# Patient Record
Sex: FEMALE | Race: WHITE | NOT HISPANIC OR LATINO
[De-identification: names, ages, dates, MRNs, and addresses within clinical notes are randomized per-mention and may not be internally consistent; named-entity substitution may affect disease eponyms.]

---

## 2017-02-06 ENCOUNTER — CHARTING TRANS (OUTPATIENT)
Dept: OTHER | Age: 72
End: 2017-02-06

## 2017-02-06 ENCOUNTER — CHARTING TRANS (OUTPATIENT)
Dept: SURGERY | Age: 72
End: 2017-02-06

## 2017-02-13 ENCOUNTER — CHARTING TRANS (OUTPATIENT)
Dept: OTHER | Age: 72
End: 2017-02-13

## 2017-02-22 ENCOUNTER — CHARTING TRANS (OUTPATIENT)
Dept: OTHER | Age: 72
End: 2017-02-22

## 2017-02-25 ENCOUNTER — LAB SERVICES (OUTPATIENT)
Dept: OTHER | Age: 72
End: 2017-02-25

## 2017-02-25 LAB
ALBUMIN SERPL BCG-MCNC: 3.8 G/DL (ref 3.6–5.1)
ALP SERPL-CCNC: 123 U/L (ref 45–105)
ALT SERPL W/O P-5'-P-CCNC: 25 U/L (ref 15–43)
AST SERPL-CCNC: 20 U/L (ref 14–43)
BASOPHIL %: 0.4 % (ref 0–1.2)
BASOPHIL ABSOLUTE #: 0.1 10*3/UL (ref 0–0.1)
BILIRUB SERPL-MCNC: 0.6 MG/DL (ref 0–1.3)
BILIRUBIN URINE: NEGATIVE
BLOOD URINE: ABNORMAL
BUN SERPL-MCNC: 8 MG/DL (ref 7–20)
CALCIUM SERPL-MCNC: 9.9 MG/DL (ref 8.6–10.6)
CHLORIDE SERPL-SCNC: 98 MMOL/L (ref 96–107)
CHOLEST SERPL-MCNC: 176 MG/DL (ref 140–200)
CLARITY: CLEAR
COLOR: ABNORMAL
CREATININE, SERUM: 0.7 MG/DL (ref 0.5–1.4)
DIFFERENTIAL TYPE: ABNORMAL
EOSINOPHIL %: 2.6 % (ref 0–10)
EOSINOPHIL ABSOLUTE #: 0.3 10*3/UL (ref 0–0.5)
GFR SERPL CREATININE-BSD FRML MDRD: >60 ML/MIN/{1.73M2}
GFR SERPL CREATININE-BSD FRML MDRD: >60 ML/MIN/{1.73M2}
GLUCOSE P FAST SERPL-MCNC: 89 MG/DL (ref 60–100)
GLUCOSE QUALITATIVE U: NEGATIVE
HCO3 SERPL-SCNC: 33 MMOL/L (ref 22–32)
HDLC SERPL-MCNC: 71 MG/DL
HEMATOCRIT: 44.8 % (ref 34–45)
HEMOGLOBIN: 14.1 G/DL (ref 11.2–15.7)
KETONES, URINE: NEGATIVE
LDLC SERPL CALC-MCNC: 85 MG/DL (ref 30–100)
LEUKOCYTE ESTERASE URINE: NEGATIVE
LYMPH PERCENT: 26.4 % (ref 20.5–51.1)
LYMPHOCYTE ABSOLUTE #: 3.4 10*3/UL (ref 1.2–3.4)
MEAN CORPUSCULAR HGB CONCENTRATION: 31.5 % (ref 32–36)
MEAN CORPUSCULAR HGB: 26.5 PG (ref 27–34)
MEAN CORPUSCULAR VOLUME: 84.2 FL (ref 79–95)
MEAN PLATELET VOLUME: 12.2 FL (ref 8.6–12.4)
MONOCYTE ABSOLUTE #: 0.8 10*3/UL (ref 0.2–0.9)
MONOCYTE PERCENT: 6.1 % (ref 4.3–12.9)
NEUTROPHIL ABSOLUTE #: 8.4 10*3/UL (ref 1.4–6.5)
NEUTROPHIL PERCENT: 64.5 % (ref 34–73.5)
NITRITE URINE: NEGATIVE
NON SQUAMOUS EPITHELIAL CELLS: NORMAL
PH URINE: 7 (ref 5–7)
PLATELET COUNT: 306 10*3/UL (ref 150–400)
POTASSIUM SERPL-SCNC: 5.6 MMOL/L (ref 3.5–5.3)
PROT SERPL-MCNC: 6.7 G/DL (ref 6.4–8.5)
RED BLOOD CELL COUNT: 5.32 10*6/UL (ref 3.7–5.2)
RED BLOOD CELLS URINE: 0 (ref 0–3)
RED CELL DISTRIBUTION WIDTH: 14.6 % (ref 11.3–14.8)
SODIUM SERPL-SCNC: 138 MMOL/L (ref 136–146)
SPECIFIC GRAVITY URINE: 1 (ref 1–1.03)
SQUAMOUS EPITHELIAL CELLS: 0
TRIGL SERPL-MCNC: 102 MG/DL (ref 0–200)
URINE PROTEIN, QUAL (DIPSTICK): NEGATIVE
UROBILINOGEN URINE: <2
WHITE BLOOD CELL COUNT: 13 10*3/UL (ref 4–10)
WHITE BLOOD CELLS URINE: NORMAL (ref 0–5)

## 2017-02-28 ENCOUNTER — LAB SERVICES (OUTPATIENT)
Dept: OTHER | Age: 72
End: 2017-02-28

## 2017-03-02 ENCOUNTER — CHARTING TRANS (OUTPATIENT)
Dept: OTHER | Age: 72
End: 2017-03-02

## 2017-03-02 ENCOUNTER — CHARTING TRANS (OUTPATIENT)
Dept: INTERNAL MEDICINE | Age: 72
End: 2017-03-02

## 2017-03-06 LAB — HEMOCCULT STL QL: NEGATIVE

## 2017-06-26 ENCOUNTER — LAB SERVICES (OUTPATIENT)
Dept: OTHER | Age: 72
End: 2017-06-26

## 2017-06-26 ENCOUNTER — CHARTING TRANS (OUTPATIENT)
Dept: OTHER | Age: 72
End: 2017-06-26

## 2017-06-28 LAB — AP REPORT: NORMAL

## 2017-06-29 ENCOUNTER — CHARTING TRANS (OUTPATIENT)
Dept: OTHER | Age: 72
End: 2017-06-29

## 2018-02-12 ENCOUNTER — LAB SERVICES (OUTPATIENT)
Dept: OTHER | Age: 73
End: 2018-02-12

## 2018-02-12 ENCOUNTER — CHARTING TRANS (OUTPATIENT)
Dept: OTHER | Age: 73
End: 2018-02-12

## 2018-02-12 LAB — 25(OH)D3 SERPL-MCNC: 32.8 NG/ML (ref 30–100)

## 2018-04-20 ENCOUNTER — CHARTING TRANS (OUTPATIENT)
Dept: OTHER | Age: 73
End: 2018-04-20

## 2018-04-28 ENCOUNTER — LAB SERVICES (OUTPATIENT)
Dept: OTHER | Age: 73
End: 2018-04-28

## 2018-04-28 LAB
ALBUMIN SERPL-MCNC: 4.4 G/DL (ref 3.6–5.1)
ALP SERPL-CCNC: 109 U/L (ref 45–105)
ALT SERPL-CCNC: 25 U/L (ref 15–43)
AST SERPL-CCNC: 20 U/L (ref 14–43)
BASOPHIL %: 0.4 % (ref 0–1.2)
BASOPHIL ABSOLUTE #: 0.1 10*3/UL (ref 0–0.1)
BILIRUB SERPL-MCNC: 0.6 MG/DL (ref 0–1.3)
BILIRUBIN URINE: NEGATIVE
BLOOD URINE: NEGATIVE
BUN SERPL-MCNC: 13 MG/DL (ref 7–20)
CALCIUM SERPL-MCNC: 10 MG/DL (ref 8.6–10.6)
CHLORIDE SERPL-SCNC: 103 MMOL/L (ref 96–107)
CHOLEST SERPL-MCNC: 171 MG/DL (ref 140–200)
CLARITY: CLEAR
CO2 SERPL-SCNC: 28 MMOL/L (ref 22–32)
COLOR: YELLOW
CREAT SERPL-MCNC: 0.8 MG/DL (ref 0.5–1.4)
DIFFERENTIAL TYPE: ABNORMAL
EOSINOPHIL %: 2.9 % (ref 0–10)
EOSINOPHIL ABSOLUTE #: 0.3 10*3/UL (ref 0–0.5)
GFR SERPL CREATININE-BSD FRML MDRD: >60 ML/MIN/{1.73M2}
GFR SERPL CREATININE-BSD FRML MDRD: >60 ML/MIN/{1.73M2}
GLUCOSE P FAST SERPL-MCNC: 96 MG/DL (ref 60–100)
GLUCOSE QUALITATIVE U: NEGATIVE
HDLC SERPL-MCNC: 66 MG/DL
HEMATOCRIT: 45.3 % (ref 34–45)
HEMOGLOBIN: 14.1 G/DL (ref 11.2–15.7)
KETONES, URINE: NEGATIVE
LDLC SERPL CALC-MCNC: 87 MG/DL (ref 30–100)
LEUKOCYTE ESTERASE URINE: NEGATIVE
LYMPH PERCENT: 28.4 % (ref 20.5–51.1)
LYMPHOCYTE ABSOLUTE #: 3.3 10*3/UL (ref 1.2–3.4)
MEAN CORPUSCULAR HGB CONCENTRATION: 31.1 % (ref 32–36)
MEAN CORPUSCULAR HGB: 27.1 PG (ref 27–34)
MEAN CORPUSCULAR VOLUME: 86.9 FL (ref 79–95)
MEAN PLATELET VOLUME: 12.3 FL (ref 8.6–12.4)
MONOCYTE ABSOLUTE #: 0.7 10*3/UL (ref 0.2–0.9)
MONOCYTE PERCENT: 6.4 % (ref 4.3–12.9)
NEUTROPHIL ABSOLUTE #: 7.1 10*3/UL (ref 1.4–6.5)
NEUTROPHIL PERCENT: 61.9 % (ref 34–73.5)
NITRITE URINE: NEGATIVE
PH URINE: 8 (ref 5–7)
PLATELET COUNT: 288 10*3/UL (ref 150–400)
POTASSIUM SERPL-SCNC: 5.4 MMOL/L (ref 3.5–5.3)
PROT SERPL-MCNC: 7.2 G/DL (ref 6.4–8.5)
RED BLOOD CELL COUNT: 5.21 10*6/UL (ref 3.7–5.2)
RED CELL DISTRIBUTION WIDTH: 15 % (ref 11.3–14.8)
SODIUM SERPL-SCNC: 143 MMOL/L (ref 136–146)
SPECIFIC GRAVITY URINE: 1.02 (ref 1–1.03)
TRIGL SERPL-MCNC: 89 MG/DL (ref 0–200)
URINE PROTEIN, QUAL (DIPSTICK): 30
UROBILINOGEN URINE: <2
WHITE BLOOD CELL COUNT: 11.5 10*3/UL (ref 4–10)

## 2018-05-07 ENCOUNTER — LAB SERVICES (OUTPATIENT)
Dept: OTHER | Age: 73
End: 2018-05-07

## 2018-05-08 LAB — HEMOCCULT STL QL IA: NEGATIVE

## 2018-05-09 ENCOUNTER — CHARTING TRANS (OUTPATIENT)
Dept: OTHER | Age: 73
End: 2018-05-09

## 2018-11-23 ENCOUNTER — IMAGING SERVICES (OUTPATIENT)
Dept: OTHER | Age: 73
End: 2018-11-23

## 2018-11-29 VITALS
WEIGHT: 149 LBS | SYSTOLIC BLOOD PRESSURE: 140 MMHG | HEART RATE: 88 BPM | DIASTOLIC BLOOD PRESSURE: 80 MMHG | OXYGEN SATURATION: 98 %

## 2018-11-29 VITALS — BODY MASS INDEX: 24.75 KG/M2 | HEIGHT: 64 IN | WEIGHT: 145 LBS

## 2019-01-01 ENCOUNTER — EXTERNAL RECORD (OUTPATIENT)
Dept: HEALTH INFORMATION MANAGEMENT | Facility: OTHER | Age: 74
End: 2019-01-01

## 2019-02-11 ENCOUNTER — IMAGING SERVICES (OUTPATIENT)
Dept: OTHER | Age: 74
End: 2019-02-11

## 2019-03-06 VITALS
OXYGEN SATURATION: 96 % | SYSTOLIC BLOOD PRESSURE: 122 MMHG | DIASTOLIC BLOOD PRESSURE: 70 MMHG | WEIGHT: 151 LBS | HEART RATE: 87 BPM | BODY MASS INDEX: 25.92 KG/M2

## 2019-03-06 VITALS — BODY MASS INDEX: 25.66 KG/M2 | HEIGHT: 65 IN | WEIGHT: 154 LBS

## 2019-05-14 ENCOUNTER — TELEPHONE (OUTPATIENT)
Dept: INTERNAL MEDICINE | Age: 74
End: 2019-05-14

## 2019-05-14 DIAGNOSIS — Z00.00 ROUTINE GENERAL MEDICAL EXAMINATION AT A HEALTH CARE FACILITY: Primary | ICD-10-CM

## 2019-05-14 DIAGNOSIS — E78.2 MIXED HYPERLIPIDEMIA: ICD-10-CM

## 2019-05-18 RX ORDER — SIMVASTATIN 10 MG
10 TABLET ORAL NIGHTLY
Qty: 90 TABLET | Refills: 0 | Status: SHIPPED | OUTPATIENT
Start: 2019-05-18 | End: 2019-07-15 | Stop reason: SDUPTHER

## 2019-05-18 RX ORDER — SIMVASTATIN 10 MG
10 TABLET ORAL NIGHTLY
COMMUNITY
Start: 2018-05-09 | End: 2019-05-18 | Stop reason: SDUPTHER

## 2019-06-08 ENCOUNTER — LAB SERVICES (OUTPATIENT)
Dept: LAB | Age: 74
End: 2019-06-08

## 2019-06-08 DIAGNOSIS — E78.2 MIXED HYPERLIPIDEMIA: ICD-10-CM

## 2019-06-08 DIAGNOSIS — Z00.00 ROUTINE GENERAL MEDICAL EXAMINATION AT A HEALTH CARE FACILITY: ICD-10-CM

## 2019-06-08 LAB
BILIRUBIN URINE: NEGATIVE
BLOOD URINE: ABNORMAL
CHOLEST SERPL-MCNC: 200 MG/DL (ref 140–200)
CLARITY: CLEAR
COLOR: YELLOW
GLUCOSE QUALITATIVE U: NEGATIVE
HDLC SERPL-MCNC: 84 MG/DL
KETONES, URINE: NEGATIVE
LDLC SERPL CALC-MCNC: 92 MG/DL (ref 30–100)
LEUKOCYTE ESTERASE URINE: ABNORMAL
NITRITE URINE: NEGATIVE
PH URINE: 6 (ref 5–7)
RED BLOOD CELLS URINE: NORMAL (ref 0–3)
SPECIFIC GRAVITY URINE: 1.01 (ref 1–1.03)
SQUAMOUS EPITHELIAL CELLS: NORMAL
TRIGL SERPL-MCNC: 120 MG/DL (ref 0–200)
URINE PROTEIN, QUAL (DIPSTICK): NEGATIVE
UROBILINOGEN URINE: <2
WHITE BLOOD CELLS URINE: NORMAL (ref 0–5)

## 2019-06-08 PROCEDURE — 36415 COLL VENOUS BLD VENIPUNCTURE: CPT | Performed by: INTERNAL MEDICINE

## 2019-06-08 PROCEDURE — 80061 LIPID PANEL: CPT | Performed by: INTERNAL MEDICINE

## 2019-06-08 PROCEDURE — 81001 URINALYSIS AUTO W/SCOPE: CPT | Performed by: INTERNAL MEDICINE

## 2019-07-03 ENCOUNTER — LAB SERVICES (OUTPATIENT)
Dept: LAB | Age: 74
End: 2019-07-03

## 2019-07-03 ENCOUNTER — APPOINTMENT (OUTPATIENT)
Dept: INTERNAL MEDICINE | Age: 74
End: 2019-07-03

## 2019-07-03 DIAGNOSIS — Z00.00 ROUTINE GENERAL MEDICAL EXAMINATION AT HEALTH CARE FACILITY: Primary | ICD-10-CM

## 2019-07-03 PROCEDURE — G0328 FECAL BLOOD SCRN IMMUNOASSAY: HCPCS | Performed by: INTERNAL MEDICINE

## 2019-07-04 LAB — HEMOCCULT STL QL IA: NEGATIVE

## 2019-07-05 ENCOUNTER — TELEPHONE (OUTPATIENT)
Dept: INTERNAL MEDICINE | Age: 74
End: 2019-07-05

## 2019-07-15 ENCOUNTER — OFFICE VISIT (OUTPATIENT)
Dept: INTERNAL MEDICINE | Age: 74
End: 2019-07-15

## 2019-07-15 VITALS
BODY MASS INDEX: 24 KG/M2 | DIASTOLIC BLOOD PRESSURE: 64 MMHG | OXYGEN SATURATION: 96 % | WEIGHT: 142 LBS | SYSTOLIC BLOOD PRESSURE: 120 MMHG | HEART RATE: 95 BPM

## 2019-07-15 DIAGNOSIS — M81.0 OSTEOPOROSIS WITHOUT CURRENT PATHOLOGICAL FRACTURE, UNSPECIFIED OSTEOPOROSIS TYPE: ICD-10-CM

## 2019-07-15 DIAGNOSIS — Z86.010 HX OF ADENOMATOUS COLONIC POLYPS: ICD-10-CM

## 2019-07-15 DIAGNOSIS — F17.200 TOBACCO USE DISORDER: ICD-10-CM

## 2019-07-15 DIAGNOSIS — Z17.0 LOBULAR CARCINOMA OF BREAST, STAGE 1, ESTROGEN RECEPTOR POSITIVE, RIGHT (CMD): ICD-10-CM

## 2019-07-15 DIAGNOSIS — C50.911 LOBULAR CARCINOMA OF BREAST, STAGE 1, ESTROGEN RECEPTOR POSITIVE, RIGHT (CMD): ICD-10-CM

## 2019-07-15 DIAGNOSIS — Z17.0 MALIGNANT NEOPLASM OF LEFT BREAST IN FEMALE, ESTROGEN RECEPTOR POSITIVE, UNSPECIFIED SITE OF BREAST (CMD): Primary | ICD-10-CM

## 2019-07-15 DIAGNOSIS — C50.912 MALIGNANT NEOPLASM OF LEFT BREAST IN FEMALE, ESTROGEN RECEPTOR POSITIVE, UNSPECIFIED SITE OF BREAST (CMD): Primary | ICD-10-CM

## 2019-07-15 DIAGNOSIS — N95.1 SYMPTOMATIC MENOPAUSAL OR FEMALE CLIMACTERIC STATES: ICD-10-CM

## 2019-07-15 PROCEDURE — 99214 OFFICE O/P EST MOD 30 MIN: CPT | Performed by: INTERNAL MEDICINE

## 2019-07-15 RX ORDER — SIMVASTATIN 10 MG
10 TABLET ORAL NIGHTLY
Qty: 90 TABLET | Refills: 3 | Status: SHIPPED | OUTPATIENT
Start: 2019-07-15

## 2019-07-15 RX ORDER — LETROZOLE 2.5 MG/1
2.5 TABLET, FILM COATED ORAL DAILY
COMMUNITY
Start: 2015-10-26

## 2019-08-04 PROBLEM — C50.912 MALIGNANT NEOPLASM OF UNSPECIFIED SITE OF LEFT FEMALE BREAST (CMD): Status: ACTIVE | Noted: 2019-08-04

## 2019-08-04 PROBLEM — Z86.010 HX OF ADENOMATOUS COLONIC POLYPS: Status: ACTIVE | Noted: 2019-08-04

## 2019-08-04 ASSESSMENT — ENCOUNTER SYMPTOMS
NEUROLOGICAL NEGATIVE: 1
GASTROINTESTINAL NEGATIVE: 1
RESPIRATORY NEGATIVE: 1
ENDOCRINE NEGATIVE: 1
ALLERGIC/IMMUNOLOGIC NEGATIVE: 1
EYES NEGATIVE: 1
HEMATOLOGIC/LYMPHATIC NEGATIVE: 1
PSYCHIATRIC NEGATIVE: 1
CONSTITUTIONAL NEGATIVE: 1

## 2020-03-02 ENCOUNTER — TELEPHONE (OUTPATIENT)
Dept: INTERNAL MEDICINE | Age: 75
End: 2020-03-02

## 2020-03-03 ENCOUNTER — OFFICE VISIT (OUTPATIENT)
Dept: INTERNAL MEDICINE | Age: 75
End: 2020-03-03

## 2020-03-03 VITALS
HEART RATE: 92 BPM | BODY MASS INDEX: 24.5 KG/M2 | SYSTOLIC BLOOD PRESSURE: 122 MMHG | RESPIRATION RATE: 16 BRPM | WEIGHT: 145 LBS | DIASTOLIC BLOOD PRESSURE: 68 MMHG | OXYGEN SATURATION: 96 %

## 2020-03-03 DIAGNOSIS — E78.2 MIXED HYPERLIPIDEMIA: ICD-10-CM

## 2020-03-03 DIAGNOSIS — M25.511 BILATERAL SHOULDER PAIN, UNSPECIFIED CHRONICITY: ICD-10-CM

## 2020-03-03 DIAGNOSIS — M79.10 MUSCLE PAIN: Primary | ICD-10-CM

## 2020-03-03 DIAGNOSIS — Z17.0 LOBULAR CARCINOMA OF BREAST, STAGE 1, ESTROGEN RECEPTOR POSITIVE, RIGHT (CMD): ICD-10-CM

## 2020-03-03 DIAGNOSIS — M25.512 BILATERAL SHOULDER PAIN, UNSPECIFIED CHRONICITY: ICD-10-CM

## 2020-03-03 DIAGNOSIS — C50.911 LOBULAR CARCINOMA OF BREAST, STAGE 1, ESTROGEN RECEPTOR POSITIVE, RIGHT (CMD): ICD-10-CM

## 2020-03-03 PROCEDURE — 99213 OFFICE O/P EST LOW 20 MIN: CPT | Performed by: INTERNAL MEDICINE

## 2020-03-17 ENCOUNTER — TELEPHONE (OUTPATIENT)
Dept: INTERNAL MEDICINE | Age: 75
End: 2020-03-17

## 2020-03-17 DIAGNOSIS — M79.10 MUSCLE PAIN: Primary | ICD-10-CM

## 2020-03-18 ENCOUNTER — LAB SERVICES (OUTPATIENT)
Dept: LAB | Age: 75
End: 2020-03-18

## 2020-03-18 DIAGNOSIS — M79.10 MUSCLE PAIN: ICD-10-CM

## 2020-03-18 LAB
ALBUMIN SERPL-MCNC: 4.6 G/DL (ref 3.6–5.1)
ALP SERPL-CCNC: 96 U/L (ref 45–130)
ALT SERPL W/O P-5'-P-CCNC: 14 U/L (ref 4–38)
AST SERPL-CCNC: 20 U/L (ref 14–43)
ATYPICAL LYMPH: 1 (ref 0–4)
BILIRUB SERPL-MCNC: 0.6 MG/DL (ref 0–1.3)
BUN SERPL-MCNC: 12 MG/DL (ref 7–20)
CALCIUM SERPL-MCNC: 9.9 MG/DL (ref 8.6–10.6)
CHLORIDE SERPL-SCNC: 99 MMOL/L (ref 96–107)
CK SERPL-CCNC: 24 U/L (ref 30–135)
CO2 SERPL-SCNC: 30 MMOL/L (ref 22–32)
CREAT SERPL-MCNC: 0.7 MG/DL (ref 0.5–1.4)
DIFFERENTIAL TYPE: ABNORMAL
EOSINOPHIL % MD: 1 % (ref 0–10)
EOSINOPHIL ABSOLUTE # MD: 0.1 /UL (ref 0–0.5)
GFR SERPL CREATININE-BSD FRML MDRD: >60 ML/MIN/{1.73M2}
GFR SERPL CREATININE-BSD FRML MDRD: >60 ML/MIN/{1.73M2}
GLUCOSE SERPL-MCNC: 112 MG/DL (ref 70–200)
HEMATOCRIT: 42.3 % (ref 34–45)
HEMOGLOBIN: 13.4 G/DL (ref 11.2–15.7)
LYMPH PERCENT MD: 20 % (ref 20.5–51.1)
LYMPHOCYTE ABSOLUTE # MD: 2.8 /UL (ref 1.2–3.4)
MEAN CORPUSCULAR HGB CONCENTRATION: 31.7 % (ref 32–36)
MEAN CORPUSCULAR HGB: 26.2 PG (ref 27–34)
MEAN CORPUSCULAR VOLUME: 82.8 FL (ref 79–95)
MEAN PLATELET VOLUME: 11 FL (ref 8.6–12.4)
MONOCYTE ABSOLUTE # MD: 1.3 /UL (ref 0.2–0.9)
MONOCYTE PERCENT MD: 10 % (ref 4.3–12.9)
NEUTROPHIL ABSOLUTE # MD: 9 /UL (ref 1.4–6.5)
NEUTROPHIL PERCENT MD: 68 % (ref 34–73.5)
PLATELET COUNT: 442 10*3/UL (ref 150–400)
POTASSIUM SERPL-SCNC: 5 MMOL/L (ref 3.5–5.3)
PROT SERPL-MCNC: 7.7 G/DL (ref 6.4–8.5)
RBC & PLT MORPHOLOGY: ABNORMAL
RED BLOOD CELL COUNT: 5.11 10*6/UL (ref 3.7–5.2)
RED CELL DISTRIBUTION WIDTH: 14.3 % (ref 11.3–14.8)
SEDIMENTATION RATE, RBC: 21 MM/H (ref 0–20)
SODIUM SERPL-SCNC: 138 MMOL/L (ref 136–146)
TSH SERPL DL<=0.05 MIU/L-ACNC: 1.27 M[IU]/L (ref 0.3–4.82)
WHITE BLOOD CELL COUNT: 13.2 10*3/UL (ref 4–10)

## 2020-03-18 PROCEDURE — 80050 GENERAL HEALTH PANEL: CPT | Performed by: INTERNAL MEDICINE

## 2020-03-18 PROCEDURE — 36415 COLL VENOUS BLD VENIPUNCTURE: CPT | Performed by: INTERNAL MEDICINE

## 2020-03-18 PROCEDURE — 85652 RBC SED RATE AUTOMATED: CPT | Performed by: INTERNAL MEDICINE

## 2020-03-18 PROCEDURE — 82550 ASSAY OF CK (CPK): CPT | Performed by: INTERNAL MEDICINE

## 2020-03-23 ENCOUNTER — TELEPHONE (OUTPATIENT)
Dept: INTERNAL MEDICINE | Age: 75
End: 2020-03-23

## 2020-03-27 ENCOUNTER — TELEPHONE (OUTPATIENT)
Dept: INTERNAL MEDICINE | Age: 75
End: 2020-03-27

## 2020-03-28 RX ORDER — MELOXICAM 15 MG/1
15 TABLET ORAL DAILY
Qty: 14 TABLET | Refills: 0 | Status: SHIPPED | OUTPATIENT
Start: 2020-03-28

## 2020-04-13 ENCOUNTER — TELEPHONE (OUTPATIENT)
Dept: INTERNAL MEDICINE | Age: 75
End: 2020-04-13

## 2020-04-13 DIAGNOSIS — M79.10 MUSCLE PAIN: Primary | ICD-10-CM

## 2020-04-13 RX ORDER — MELOXICAM 15 MG/1
15 TABLET ORAL DAILY
Qty: 14 TABLET | Refills: 0 | Status: CANCELLED | OUTPATIENT
Start: 2020-04-13

## 2020-04-20 ENCOUNTER — TELEPHONE (OUTPATIENT)
Dept: INTERNAL MEDICINE | Age: 75
End: 2020-04-20

## 2020-04-21 ENCOUNTER — LAB SERVICES (OUTPATIENT)
Dept: LAB | Age: 75
End: 2020-04-21

## 2020-04-21 DIAGNOSIS — M79.10 MUSCLE PAIN: ICD-10-CM

## 2020-04-21 LAB
ANISOCYTOSIS: ABNORMAL
BANDS: 1 % (ref 0–5)
BASOPHIL % MD: 2 % (ref 0–1.2)
BASOPHIL ABSOLUTE # MD: 0.3 /UL (ref 0–0.1)
DIFFERENTIAL TYPE: ABNORMAL
EOSINOPHIL % MD: 1 % (ref 0–10)
EOSINOPHIL ABSOLUTE # MD: 0.2 /UL (ref 0–0.5)
HEMATOCRIT: 42.2 % (ref 34–45)
HEMOGLOBIN: 13 G/DL (ref 11.2–15.7)
LYMPH PERCENT MD: 27 % (ref 20.5–51.1)
LYMPHOCYTE ABSOLUTE # MD: 4.1 /UL (ref 1.2–3.4)
MEAN CORPUSCULAR HGB CONCENTRATION: 30.8 % (ref 32–36)
MEAN CORPUSCULAR HGB: 25.3 PG (ref 27–34)
MEAN CORPUSCULAR VOLUME: 82.3 FL (ref 79–95)
MEAN PLATELET VOLUME: 11.3 FL (ref 8.6–12.4)
MONOCYTE ABSOLUTE # MD: 0.6 /UL (ref 0.2–0.9)
MONOCYTE PERCENT MD: 4 % (ref 4.3–12.9)
NEUTROPHIL ABSOLUTE # MD: 10 /UL (ref 1.4–6.5)
NEUTROPHIL PERCENT MD: 65 % (ref 34–73.5)
PLATELET COUNT: 440 10*3/UL (ref 150–400)
RED BLOOD CELL COUNT: 5.13 10*6/UL (ref 3.7–5.2)
RED CELL DISTRIBUTION WIDTH: 15.1 % (ref 11.3–14.8)
SEDIMENTATION RATE, RBC: 41 MM/H (ref 0–20)
WHITE BLOOD CELL COUNT: 15.2 10*3/UL (ref 4–10)

## 2020-04-21 PROCEDURE — 85652 RBC SED RATE AUTOMATED: CPT | Performed by: INTERNAL MEDICINE

## 2020-04-21 PROCEDURE — 85027 COMPLETE CBC AUTOMATED: CPT | Performed by: INTERNAL MEDICINE

## 2020-04-21 PROCEDURE — 85007 BL SMEAR W/DIFF WBC COUNT: CPT | Performed by: INTERNAL MEDICINE

## 2020-04-21 PROCEDURE — 36415 COLL VENOUS BLD VENIPUNCTURE: CPT | Performed by: INTERNAL MEDICINE

## 2020-04-22 RX ORDER — PREDNISONE 10 MG/1
10 TABLET ORAL DAILY
Qty: 30 TABLET | Refills: 1 | Status: SHIPPED | OUTPATIENT
Start: 2020-04-22 | End: 2020-06-03 | Stop reason: SDUPTHER

## 2020-04-30 ENCOUNTER — TELEPHONE (OUTPATIENT)
Dept: INTERNAL MEDICINE | Age: 75
End: 2020-04-30

## 2020-04-30 DIAGNOSIS — M79.10 MUSCLE PAIN: Primary | ICD-10-CM

## 2020-05-08 ENCOUNTER — LAB SERVICES (OUTPATIENT)
Dept: LAB | Age: 75
End: 2020-05-08

## 2020-05-08 DIAGNOSIS — M79.10 MUSCLE PAIN: ICD-10-CM

## 2020-05-08 LAB — SEDIMENTATION RATE, RBC: 26 MM/H (ref 0–20)

## 2020-05-08 PROCEDURE — 85652 RBC SED RATE AUTOMATED: CPT | Performed by: INTERNAL MEDICINE

## 2020-05-08 PROCEDURE — 36415 COLL VENOUS BLD VENIPUNCTURE: CPT | Performed by: INTERNAL MEDICINE

## 2020-05-11 DIAGNOSIS — M79.10 MUSCLE PAIN: Primary | ICD-10-CM

## 2020-06-01 ENCOUNTER — LAB SERVICES (OUTPATIENT)
Dept: LAB | Age: 75
End: 2020-06-01

## 2020-06-01 DIAGNOSIS — M79.10 MUSCLE PAIN: ICD-10-CM

## 2020-06-01 LAB — SEDIMENTATION RATE, RBC: 22 MM/H (ref 0–20)

## 2020-06-01 PROCEDURE — 85652 RBC SED RATE AUTOMATED: CPT | Performed by: INTERNAL MEDICINE

## 2020-06-01 PROCEDURE — 36415 COLL VENOUS BLD VENIPUNCTURE: CPT | Performed by: INTERNAL MEDICINE

## 2020-06-03 DIAGNOSIS — M79.10 MUSCLE PAIN: Primary | ICD-10-CM

## 2020-06-03 RX ORDER — PREDNISONE 10 MG/1
10 TABLET ORAL DAILY
Qty: 30 TABLET | Refills: 0 | Status: SHIPPED | OUTPATIENT
Start: 2020-06-03 | End: 2020-06-29 | Stop reason: SDUPTHER

## 2020-06-23 ENCOUNTER — LAB SERVICES (OUTPATIENT)
Dept: LAB | Age: 75
End: 2020-06-23

## 2020-06-23 DIAGNOSIS — M79.10 MUSCLE PAIN: ICD-10-CM

## 2020-06-23 LAB — SEDIMENTATION RATE, RBC: 42 MM/H (ref 0–20)

## 2020-06-23 PROCEDURE — 85652 RBC SED RATE AUTOMATED: CPT | Performed by: INTERNAL MEDICINE

## 2020-06-23 PROCEDURE — 36415 COLL VENOUS BLD VENIPUNCTURE: CPT | Performed by: INTERNAL MEDICINE

## 2020-06-29 ENCOUNTER — TELEPHONE (OUTPATIENT)
Dept: INTERNAL MEDICINE | Age: 75
End: 2020-06-29

## 2020-06-29 DIAGNOSIS — M79.10 MUSCLE PAIN: Primary | ICD-10-CM

## 2020-06-29 RX ORDER — PREDNISONE 10 MG/1
10 TABLET ORAL DAILY
Qty: 30 TABLET | Refills: 3 | Status: SHIPPED | OUTPATIENT
Start: 2020-06-29

## 2020-07-14 ENCOUNTER — TELEPHONE (OUTPATIENT)
Dept: INTERNAL MEDICINE | Age: 75
End: 2020-07-14

## 2020-08-18 ENCOUNTER — OFFICE VISIT (OUTPATIENT)
Dept: FAMILY MEDICINE CLINIC | Facility: CLINIC | Age: 75
End: 2020-08-18
Payer: COMMERCIAL

## 2020-08-18 ENCOUNTER — LAB ENCOUNTER (OUTPATIENT)
Dept: LAB | Age: 75
End: 2020-08-18
Attending: FAMILY MEDICINE
Payer: MEDICARE

## 2020-08-18 VITALS
TEMPERATURE: 98 F | DIASTOLIC BLOOD PRESSURE: 88 MMHG | SYSTOLIC BLOOD PRESSURE: 138 MMHG | HEIGHT: 63 IN | WEIGHT: 152.5 LBS | BODY MASS INDEX: 27.02 KG/M2 | HEART RATE: 115 BPM | OXYGEN SATURATION: 96 % | RESPIRATION RATE: 12 BRPM

## 2020-08-18 DIAGNOSIS — E78.2 MIXED HYPERLIPIDEMIA: Primary | ICD-10-CM

## 2020-08-18 DIAGNOSIS — E78.2 MIXED HYPERLIPIDEMIA: ICD-10-CM

## 2020-08-18 DIAGNOSIS — Z13.6 SCREENING FOR CARDIOVASCULAR CONDITION: ICD-10-CM

## 2020-08-18 DIAGNOSIS — Z11.59 NEED FOR HEPATITIS C SCREENING TEST: ICD-10-CM

## 2020-08-18 DIAGNOSIS — Z98.890 S/P BREAST RECONSTRUCTION, BILATERAL: ICD-10-CM

## 2020-08-18 DIAGNOSIS — C50.912 LOBULAR CARCINOMA OF LEFT BREAST (HCC): ICD-10-CM

## 2020-08-18 DIAGNOSIS — Z00.00 LABORATORY EXAMINATION ORDERED AS PART OF A ROUTINE GENERAL MEDICAL EXAMINATION: ICD-10-CM

## 2020-08-18 DIAGNOSIS — M35.3 POLYMYALGIA (HCC): ICD-10-CM

## 2020-08-18 DIAGNOSIS — Z00.00 ENCOUNTER FOR ANNUAL HEALTH EXAMINATION: ICD-10-CM

## 2020-08-18 DIAGNOSIS — Z90.13 S/P BILATERAL MASTECTOMY: ICD-10-CM

## 2020-08-18 DIAGNOSIS — Z13.0 SCREENING, ANEMIA, DEFICIENCY, IRON: ICD-10-CM

## 2020-08-18 DIAGNOSIS — M81.0 AGE-RELATED OSTEOPOROSIS WITHOUT CURRENT PATHOLOGICAL FRACTURE: ICD-10-CM

## 2020-08-18 DIAGNOSIS — Q83.8 AMASTIA: ICD-10-CM

## 2020-08-18 DIAGNOSIS — C50.012 MALIGNANT NEOPLASM INVOLVING BOTH NIPPLE AND AREOLA OF LEFT BREAST IN FEMALE, UNSPECIFIED ESTROGEN RECEPTOR STATUS (HCC): ICD-10-CM

## 2020-08-18 DIAGNOSIS — N95.1 SYMPTOMATIC MENOPAUSAL OR FEMALE CLIMACTERIC STATES: ICD-10-CM

## 2020-08-18 DIAGNOSIS — F17.200 TOBACCO USE DISORDER: ICD-10-CM

## 2020-08-18 DIAGNOSIS — Z86.010 HX OF ADENOMATOUS COLONIC POLYPS: ICD-10-CM

## 2020-08-18 PROBLEM — Z86.0101 HX OF ADENOMATOUS COLONIC POLYPS: Status: ACTIVE | Noted: 2019-08-04

## 2020-08-18 LAB
ALBUMIN SERPL-MCNC: 3.9 G/DL (ref 3.4–5)
ALBUMIN/GLOB SERPL: 1 {RATIO} (ref 1–2)
ALP LIVER SERPL-CCNC: 112 U/L (ref 55–142)
ALT SERPL-CCNC: 28 U/L (ref 13–56)
ANION GAP SERPL CALC-SCNC: 4 MMOL/L (ref 0–18)
AST SERPL-CCNC: 17 U/L (ref 15–37)
BASOPHILS # BLD AUTO: 0.09 X10(3) UL (ref 0–0.2)
BASOPHILS NFR BLD AUTO: 0.5 %
BILIRUB SERPL-MCNC: 0.4 MG/DL (ref 0.1–2)
BUN BLD-MCNC: 13 MG/DL (ref 7–18)
BUN/CREAT SERPL: 14.6 (ref 10–20)
CALCIUM BLD-MCNC: 9.9 MG/DL (ref 8.5–10.1)
CHLORIDE SERPL-SCNC: 103 MMOL/L (ref 98–112)
CHOLEST SMN-MCNC: 201 MG/DL (ref ?–200)
CO2 SERPL-SCNC: 30 MMOL/L (ref 21–32)
CREAT BLD-MCNC: 0.89 MG/DL (ref 0.55–1.02)
CRP SERPL HS-MCNC: 26.7 MG/L (ref ?–3)
DEPRECATED RDW RBC AUTO: 49 FL (ref 35.1–46.3)
EOSINOPHIL # BLD AUTO: 0.05 X10(3) UL (ref 0–0.7)
EOSINOPHIL NFR BLD AUTO: 0.3 %
ERYTHROCYTE [DISTWIDTH] IN BLOOD BY AUTOMATED COUNT: 15.5 % (ref 11–15)
GLOBULIN PLAS-MCNC: 3.8 G/DL (ref 2.8–4.4)
GLUCOSE BLD-MCNC: 109 MG/DL (ref 70–99)
HCT VFR BLD AUTO: 48 % (ref 35–48)
HCV AB SERPL QL IA: NONREACTIVE
HDLC SERPL-MCNC: 81 MG/DL (ref 40–59)
HGB BLD-MCNC: 14.4 G/DL (ref 12–16)
IMM GRANULOCYTES # BLD AUTO: 0.1 X10(3) UL (ref 0–1)
IMM GRANULOCYTES NFR BLD: 0.6 %
LDLC SERPL CALC-MCNC: 90 MG/DL (ref ?–100)
LYMPHOCYTES # BLD AUTO: 1.79 X10(3) UL (ref 1–4)
LYMPHOCYTES NFR BLD AUTO: 10.2 %
M PROTEIN MFR SERPL ELPH: 7.7 G/DL (ref 6.4–8.2)
MCH RBC QN AUTO: 26 PG (ref 26–34)
MCHC RBC AUTO-ENTMCNC: 30 G/DL (ref 31–37)
MCV RBC AUTO: 86.6 FL (ref 80–100)
MONOCYTES # BLD AUTO: 0.65 X10(3) UL (ref 0.1–1)
MONOCYTES NFR BLD AUTO: 3.7 %
NEUTROPHILS # BLD AUTO: 14.79 X10 (3) UL (ref 1.5–7.7)
NEUTROPHILS # BLD AUTO: 14.79 X10(3) UL (ref 1.5–7.7)
NEUTROPHILS NFR BLD AUTO: 84.7 %
NONHDLC SERPL-MCNC: 120 MG/DL (ref ?–130)
OSMOLALITY SERPL CALC.SUM OF ELEC: 285 MOSM/KG (ref 275–295)
PLATELET # BLD AUTO: 322 10(3)UL (ref 150–450)
POTASSIUM SERPL-SCNC: 4.2 MMOL/L (ref 3.5–5.1)
RBC # BLD AUTO: 5.54 X10(6)UL (ref 3.8–5.3)
SODIUM SERPL-SCNC: 137 MMOL/L (ref 136–145)
TRIGL SERPL-MCNC: 149 MG/DL (ref 30–149)
VLDLC SERPL CALC-MCNC: 30 MG/DL (ref 0–30)
WBC # BLD AUTO: 17.5 X10(3) UL (ref 4–11)

## 2020-08-18 PROCEDURE — 80061 LIPID PANEL: CPT

## 2020-08-18 PROCEDURE — G0438 PPPS, INITIAL VISIT: HCPCS | Performed by: FAMILY MEDICINE

## 2020-08-18 PROCEDURE — 80053 COMPREHEN METABOLIC PANEL: CPT

## 2020-08-18 PROCEDURE — 86803 HEPATITIS C AB TEST: CPT

## 2020-08-18 PROCEDURE — 86141 C-REACTIVE PROTEIN HS: CPT

## 2020-08-18 PROCEDURE — 85025 COMPLETE CBC W/AUTO DIFF WBC: CPT

## 2020-08-18 PROCEDURE — 36415 COLL VENOUS BLD VENIPUNCTURE: CPT

## 2020-08-18 RX ORDER — SIMVASTATIN 10 MG
10 TABLET ORAL NIGHTLY
Qty: 90 TABLET | Refills: 1 | Status: SHIPPED | OUTPATIENT
Start: 2020-08-18 | End: 2021-02-14

## 2020-08-18 RX ORDER — PREDNISONE 1 MG/1
7.5 TABLET ORAL DAILY
Qty: 135 TABLET | Refills: 0 | Status: SHIPPED | OUTPATIENT
Start: 2020-08-18 | End: 2020-09-23

## 2020-08-18 RX ORDER — PREDNISONE 10 MG/1
10 TABLET ORAL DAILY
COMMUNITY
Start: 2020-07-17 | End: 2020-08-18 | Stop reason: DRUGHIGH

## 2020-08-18 RX ORDER — MAGNESIUM OXIDE 400 MG (241.3 MG MAGNESIUM) TABLET
TABLET
COMMUNITY

## 2020-08-18 RX ORDER — SIMVASTATIN 10 MG
TABLET ORAL
COMMUNITY
Start: 2016-11-01 | End: 2020-08-18

## 2020-08-18 RX ORDER — LETROZOLE 2.5 MG/1
2.5 TABLET, FILM COATED ORAL DAILY
COMMUNITY
Start: 2015-10-26 | End: 2021-10-05

## 2020-08-18 NOTE — PROGRESS NOTES
HPI:   Paige Roa is a 76year old female who presents for a Medicare Initial Annual Wellness visit (Once after 12 month Medicare anniversary) . Patient is new patient that has been treated in the past for some issues.   Her chronic issue specifically Use      Smoking status: Current Every Day Smoker        Packs/day: 0.50        Years: 56.00        Pack years: 29        Types: Cigarettes      Smokeless tobacco: Never Used     This is a tobacco user, and I will give tobacco cessation counseling today.  ( file.    She  has no past surgical history on file. Her family history is not on file. SOCIAL HISTORY:   She  reports that she has been smoking cigarettes. She has a 28.00 pack-year smoking history.  She has never used smokeless tobacco. She reports th Future    Polymyalgia ([facility] [address]  -     CBC WITH DIFFERENTIAL WITH PLATELET; Future  -     C-RP/HIGH SENSITIVITY; Future    Lobular carcinoma of left breast (HCC)  -     CBC WITH DIFFERENTIAL WITH PLATELET; Future  -     C-RP/HIGH SENSITIVITY;  Future    Screeni Overview     Dx/ 9/2015. LEFT. T1c,N0,M0. Lobular. ER+,NE+,Her2/Dania-. Oncotype Dx. Risk = 10%. Rx. MRM > Femara. Dx. 9/2015. RIGHT. T1c,N0,M0. Lobular + LCIS, ER+,NE+,Her2/Dania-. Oncotype Dx. 11% risk. Rx. MRM + SND > Femara. Dx/ 9/2015. LEFT.  T1c,N0,M0 state?: Good  How do you maintain positive mental well-being?: Social Interaction; Visiting Family; Visiting Friends      This section provided for quick review of chart, separate sheet to patient  [address] Internal Lab (Prevnar)  Covered Once after 65 03/02/2017 Please get once after your 65th birthday    Pneumococcal 23 (Pneumovax)  Covered Once after 65 11/13/2013 Please get once after your 65th birthday    Hepatitis B for Moderate/High Risk No vaccine history found Me

## 2020-08-18 NOTE — PATIENT INSTRUCTIONS
Kayla Roach's SCREENING SCHEDULE   Tests on this list are recommended by your physician but may not be covered, or covered at this frequency, by your insurer. Please check with your insurance carrier before scheduling to verify coverage.    PREVENTATIVE SE this or any previous visit. No flowsheet data found. Fecal Occult Blood   Covered Annually No results found for: FOB, OCCULTSTOOL No flowsheet data found.      Barium Enema-   uncomfortable but covered  Covered but uncomfortable   Glaucoma Screening visit. Medium/high risk factors:   End-stage renal disease   Hemophiliacs who received Factor VIII or IX concentrates   Clients of institutions for the mentally retarded   Persons who live in the same house as a HepB virus carrier   Homosexual men   [place]

## 2020-08-20 ENCOUNTER — TELEPHONE (OUTPATIENT)
Dept: FAMILY MEDICINE CLINIC | Facility: CLINIC | Age: 75
End: 2020-08-20

## 2020-08-20 DIAGNOSIS — Z79.899 ENCOUNTER FOR LONG-TERM (CURRENT) DRUG USE: ICD-10-CM

## 2020-08-20 DIAGNOSIS — M35.3 POLYMYALGIA (HCC): ICD-10-CM

## 2020-08-20 DIAGNOSIS — E78.2 MIXED HYPERLIPIDEMIA: Primary | ICD-10-CM

## 2020-08-20 DIAGNOSIS — D72.829 LEUKOCYTOSIS, UNSPECIFIED TYPE: ICD-10-CM

## 2020-08-20 NOTE — TELEPHONE ENCOUNTER
----- Message from Indira Junior MD sent at 8/20/2020  8:45 AM CDT -----  Hep c neg  Does not need repeat  notify lipids normal recheck 6 months  The cbc is sl elevated  Likely from the prednisone  The crp test is a sign of inflammation---obviously her branden

## 2020-08-20 NOTE — TELEPHONE ENCOUNTER
Alfredito Bui Nurse   Caller: Unspecified (Today, 12:40 PM)             Pt.  Called back for lab results

## 2020-09-17 ENCOUNTER — APPOINTMENT (OUTPATIENT)
Dept: LAB | Age: 75
End: 2020-09-17
Attending: FAMILY MEDICINE
Payer: MEDICARE

## 2020-09-17 LAB
BASOPHILS # BLD AUTO: 0.1 X10(3) UL (ref 0–0.2)
BASOPHILS NFR BLD AUTO: 0.6 %
CRP SERPL-MCNC: 2.01 MG/DL (ref ?–0.3)
DEPRECATED RDW RBC AUTO: 50 FL (ref 35.1–46.3)
EOSINOPHIL # BLD AUTO: 0.23 X10(3) UL (ref 0–0.7)
EOSINOPHIL NFR BLD AUTO: 1.4 %
ERYTHROCYTE [DISTWIDTH] IN BLOOD BY AUTOMATED COUNT: 15.3 % (ref 11–15)
HCT VFR BLD AUTO: 47 % (ref 35–48)
HGB BLD-MCNC: 13.7 G/DL (ref 12–16)
IMM GRANULOCYTES # BLD AUTO: 0.1 X10(3) UL (ref 0–1)
IMM GRANULOCYTES NFR BLD: 0.6 %
LYMPHOCYTES # BLD AUTO: 4.51 X10(3) UL (ref 1–4)
LYMPHOCYTES NFR BLD AUTO: 26.9 %
MCH RBC QN AUTO: 25.7 PG (ref 26–34)
MCHC RBC AUTO-ENTMCNC: 29.1 G/DL (ref 31–37)
MCV RBC AUTO: 88.2 FL (ref 80–100)
MONOCYTES # BLD AUTO: 1.12 X10(3) UL (ref 0.1–1)
MONOCYTES NFR BLD AUTO: 6.7 %
NEUTROPHILS # BLD AUTO: 10.71 X10 (3) UL (ref 1.5–7.7)
NEUTROPHILS # BLD AUTO: 10.71 X10(3) UL (ref 1.5–7.7)
NEUTROPHILS NFR BLD AUTO: 63.8 %
PLATELET # BLD AUTO: 312 10(3)UL (ref 150–450)
RBC # BLD AUTO: 5.33 X10(6)UL (ref 3.8–5.3)
WBC # BLD AUTO: 16.8 X10(3) UL (ref 4–11)

## 2020-09-23 ENCOUNTER — TELEPHONE (OUTPATIENT)
Dept: FAMILY MEDICINE CLINIC | Facility: CLINIC | Age: 75
End: 2020-09-23

## 2020-09-23 DIAGNOSIS — Z00.00 LABORATORY EXAMINATION ORDERED AS PART OF A ROUTINE GENERAL MEDICAL EXAMINATION: ICD-10-CM

## 2020-09-23 DIAGNOSIS — D72.829 LEUKOCYTOSIS, UNSPECIFIED TYPE: Primary | ICD-10-CM

## 2020-09-23 RX ORDER — PREDNISONE 1 MG/1
5 TABLET ORAL DAILY
Qty: 90 TABLET | Refills: 0 | COMMUNITY
Start: 2020-09-23 | End: 2020-10-29

## 2020-09-23 NOTE — TELEPHONE ENCOUNTER
Cbc still no anemia  The wbc has come down sl  Yet still elevated  Mild elevation of the crp  But this is not extremely high so not certain of the clinical significance  Appropriate to check cbc in 6 months to monitor

## 2020-09-23 NOTE — TELEPHONE ENCOUNTER
Currently on steroids, lowered to 7.5 mg, no difficulties. Advised decrease to 5 mg. Recheck cbc in 6 months.

## 2020-10-29 ENCOUNTER — TELEPHONE (OUTPATIENT)
Dept: FAMILY MEDICINE CLINIC | Facility: CLINIC | Age: 75
End: 2020-10-29

## 2020-10-29 DIAGNOSIS — Z00.00 LABORATORY EXAMINATION ORDERED AS PART OF A ROUTINE GENERAL MEDICAL EXAMINATION: ICD-10-CM

## 2020-10-29 RX ORDER — PREDNISONE 1 MG/1
5 TABLET ORAL DAILY
Qty: 90 TABLET | Refills: 0 | Status: SHIPPED | OUTPATIENT
Start: 2020-10-29 | End: 2020-12-28

## 2020-10-29 NOTE — TELEPHONE ENCOUNTER
predniSONE 5 MG Oral Tab    [facility] #[id number] [place], [place] - [address] [place] [address] [address] [place] [address] ([address]), [phone], [phone]    LAST VISIT 08/18/2020 (BETSY)

## 2020-11-05 ENCOUNTER — IMMUNIZATION (OUTPATIENT)
Dept: FAMILY MEDICINE CLINIC | Facility: CLINIC | Age: 75
End: 2020-11-05
Payer: MEDICARE

## 2020-11-05 DIAGNOSIS — Z23 NEED FOR VACCINATION: ICD-10-CM

## 2020-11-05 PROCEDURE — 90662 IIV NO PRSV INCREASED AG IM: CPT | Performed by: FAMILY MEDICINE

## 2020-11-05 PROCEDURE — G0008 ADMIN INFLUENZA VIRUS VAC: HCPCS | Performed by: FAMILY MEDICINE

## 2020-12-28 DIAGNOSIS — Z00.00 LABORATORY EXAMINATION ORDERED AS PART OF A ROUTINE GENERAL MEDICAL EXAMINATION: ICD-10-CM

## 2020-12-28 RX ORDER — PREDNISONE 1 MG/1
5 TABLET ORAL DAILY
Qty: 90 TABLET | Refills: 0 | Status: SHIPPED | OUTPATIENT
Start: 2020-12-28 | End: 2021-02-16

## 2020-12-28 NOTE — TELEPHONE ENCOUNTER
LOV 08/18/2020    LAST LAB 09/17/2020    LAST RX  Prednisone #90 R0 10/29/2020    Next OV No future appointments.       PROTOCOL

## 2021-02-03 DIAGNOSIS — Z23 NEED FOR VACCINATION: ICD-10-CM

## 2021-02-10 ENCOUNTER — TELEPHONE (OUTPATIENT)
Dept: FAMILY MEDICINE CLINIC | Facility: CLINIC | Age: 76
End: 2021-02-10

## 2021-02-10 NOTE — TELEPHONE ENCOUNTER
PATIENT GOT HER COVID INJECTION ON  JANUARY 29TH EVERYTHING FINE THEN ON SATURDAY INJECTION SITE WAS RED  IT IS SUBSIDING NOW AND BACK HURT NOW  WANTS TO KNOW IF SHE CAN GET A MUSCLE RELAXER/ [facility]

## 2021-02-10 NOTE — TELEPHONE ENCOUNTER
Advised patient to take tylenol and warm compress to the site. Patient reports increased discomfort while sleeping but no other issues. Will call back if anything changes.

## 2021-02-15 ENCOUNTER — OFFICE VISIT (OUTPATIENT)
Dept: FAMILY MEDICINE CLINIC | Facility: CLINIC | Age: 76
End: 2021-02-15
Payer: COMMERCIAL

## 2021-02-15 ENCOUNTER — TELEPHONE (OUTPATIENT)
Dept: FAMILY MEDICINE CLINIC | Facility: CLINIC | Age: 76
End: 2021-02-15

## 2021-02-15 ENCOUNTER — LAB ENCOUNTER (OUTPATIENT)
Dept: LAB | Age: 76
End: 2021-02-15
Attending: NURSE PRACTITIONER
Payer: MEDICARE

## 2021-02-15 VITALS
RESPIRATION RATE: 16 BRPM | HEART RATE: 100 BPM | OXYGEN SATURATION: 98 % | DIASTOLIC BLOOD PRESSURE: 80 MMHG | SYSTOLIC BLOOD PRESSURE: 140 MMHG | HEIGHT: 60.3 IN | BODY MASS INDEX: 30.81 KG/M2 | WEIGHT: 159 LBS | TEMPERATURE: 98 F

## 2021-02-15 DIAGNOSIS — D72.829 LEUKOCYTOSIS, UNSPECIFIED TYPE: ICD-10-CM

## 2021-02-15 DIAGNOSIS — M35.3 POLYMYALGIA RHEUMATICA (HCC): ICD-10-CM

## 2021-02-15 DIAGNOSIS — M62.830 SPASM OF THORACIC BACK MUSCLE: Primary | ICD-10-CM

## 2021-02-15 LAB
ALBUMIN SERPL-MCNC: 4.1 G/DL (ref 3.4–5)
ALBUMIN/GLOB SERPL: 1.1 {RATIO} (ref 1–2)
ALP LIVER SERPL-CCNC: 106 U/L
ALT SERPL-CCNC: 21 U/L
ANION GAP SERPL CALC-SCNC: 7 MMOL/L (ref 0–18)
AST SERPL-CCNC: 16 U/L (ref 15–37)
BASOPHILS # BLD AUTO: 0.07 X10(3) UL (ref 0–0.2)
BASOPHILS NFR BLD AUTO: 0.5 %
BILIRUB SERPL-MCNC: 0.4 MG/DL (ref 0.1–2)
BUN BLD-MCNC: 10 MG/DL (ref 7–18)
BUN/CREAT SERPL: 14.5 (ref 10–20)
CALCIUM BLD-MCNC: 10.2 MG/DL (ref 8.5–10.1)
CHLORIDE SERPL-SCNC: 106 MMOL/L (ref 98–112)
CHOLEST SMN-MCNC: 179 MG/DL (ref ?–200)
CO2 SERPL-SCNC: 27 MMOL/L (ref 21–32)
CREAT BLD-MCNC: 0.69 MG/DL
CRP SERPL-MCNC: 5.7 MG/DL (ref ?–0.3)
DEPRECATED RDW RBC AUTO: 44.9 FL (ref 35.1–46.3)
EOSINOPHIL # BLD AUTO: 0.04 X10(3) UL (ref 0–0.7)
EOSINOPHIL NFR BLD AUTO: 0.3 %
ERYTHROCYTE [DISTWIDTH] IN BLOOD BY AUTOMATED COUNT: 14.3 % (ref 11–15)
GLOBULIN PLAS-MCNC: 3.8 G/DL (ref 2.8–4.4)
GLUCOSE BLD-MCNC: 102 MG/DL (ref 70–99)
HCT VFR BLD AUTO: 44.6 %
HDLC SERPL-MCNC: 78 MG/DL (ref 40–59)
HGB BLD-MCNC: 13.6 G/DL
IMM GRANULOCYTES # BLD AUTO: 0.06 X10(3) UL (ref 0–1)
IMM GRANULOCYTES NFR BLD: 0.4 %
LDLC SERPL CALC-MCNC: 82 MG/DL (ref ?–100)
LYMPHOCYTES # BLD AUTO: 1.58 X10(3) UL (ref 1–4)
LYMPHOCYTES NFR BLD AUTO: 10.9 %
M PROTEIN MFR SERPL ELPH: 7.9 G/DL (ref 6.4–8.2)
MCH RBC QN AUTO: 26.2 PG (ref 26–34)
MCHC RBC AUTO-ENTMCNC: 30.5 G/DL (ref 31–37)
MCV RBC AUTO: 85.9 FL
MONOCYTES # BLD AUTO: 0.43 X10(3) UL (ref 0.1–1)
MONOCYTES NFR BLD AUTO: 3 %
NEUTROPHILS # BLD AUTO: 12.31 X10 (3) UL (ref 1.5–7.7)
NEUTROPHILS # BLD AUTO: 12.31 X10(3) UL (ref 1.5–7.7)
NEUTROPHILS NFR BLD AUTO: 84.9 %
NONHDLC SERPL-MCNC: 101 MG/DL (ref ?–130)
OSMOLALITY SERPL CALC.SUM OF ELEC: 289 MOSM/KG (ref 275–295)
PLATELET # BLD AUTO: 328 10(3)UL (ref 150–450)
POTASSIUM SERPL-SCNC: 4.4 MMOL/L (ref 3.5–5.1)
RBC # BLD AUTO: 5.19 X10(6)UL
SODIUM SERPL-SCNC: 140 MMOL/L (ref 136–145)
TRIGL SERPL-MCNC: 93 MG/DL (ref 30–149)
VLDLC SERPL CALC-MCNC: 19 MG/DL (ref 0–30)
WBC # BLD AUTO: 14.5 X10(3) UL (ref 4–11)

## 2021-02-15 PROCEDURE — 3008F BODY MASS INDEX DOCD: CPT | Performed by: NURSE PRACTITIONER

## 2021-02-15 PROCEDURE — 3079F DIAST BP 80-89 MM HG: CPT | Performed by: NURSE PRACTITIONER

## 2021-02-15 PROCEDURE — 99213 OFFICE O/P EST LOW 20 MIN: CPT | Performed by: NURSE PRACTITIONER

## 2021-02-15 PROCEDURE — 3077F SYST BP >= 140 MM HG: CPT | Performed by: NURSE PRACTITIONER

## 2021-02-15 RX ORDER — ACETAMINOPHEN 500 MG
1 TABLET ORAL EVERY 6 HOURS PRN
COMMUNITY
End: 2021-07-22

## 2021-02-15 NOTE — TELEPHONE ENCOUNTER
Spoke to patient who states she has severe back pain. She believes it is from her covid vaccine. She is struggling to walk. Suggested she needs to be seen.

## 2021-02-15 NOTE — TELEPHONE ENCOUNTER
PATIENT RECEIVED HER COVID SHOT ON THE 29TH OF JANUARY AND 8 DAYS LATER SHE IS HAVING SEVEREBACK PAIN HAS TRIED ALEVE AND NOTHINGS TAKING THE PAIN IS HAVING A HARD TIME WALKING/ Shaaron Clement NICHOLAS

## 2021-02-15 NOTE — PROGRESS NOTES
HPI: HPI   Patient is here for back pain. Pain began on 2/7 and has been slowly worsening. She received her first COVID vaccine on 1/29. The injection site was red and sore initally but that resolved by 2/6.  Believes the back pain is also related to the Musculoskeletal: Positive for back pain. Neurological: Negative for dizziness, weakness and numbness.         EXAM:   /80   Pulse 100   Temp 97.8 °F (36.6 °C) (Temporal)   Resp 16   Ht 5' 0.3\" (1.532 m)   Wt 159 lb (72.1 kg)   SpO2 98%   BMI 30.74

## 2021-02-16 ENCOUNTER — TELEPHONE (OUTPATIENT)
Dept: FAMILY MEDICINE CLINIC | Facility: CLINIC | Age: 76
End: 2021-02-16

## 2021-02-16 DIAGNOSIS — Z00.00 LABORATORY EXAMINATION ORDERED AS PART OF A ROUTINE GENERAL MEDICAL EXAMINATION: ICD-10-CM

## 2021-02-16 RX ORDER — PREDNISONE 1 MG/1
5 TABLET ORAL DAILY
Qty: 90 TABLET | Refills: 0 | Status: SHIPPED | OUTPATIENT
Start: 2021-02-16 | End: 2021-05-13

## 2021-02-16 RX ORDER — PREDNISONE 1 MG/1
5 TABLET ORAL DAILY
Qty: 90 TABLET | Refills: 0 | Status: SHIPPED | OUTPATIENT
Start: 2021-02-16 | End: 2021-02-16

## 2021-02-16 RX ORDER — PREDNISONE 20 MG/1
20 TABLET ORAL DAILY
Qty: 5 TABLET | Refills: 0 | Status: SHIPPED | OUTPATIENT
Start: 2021-02-16 | End: 2021-02-21

## 2021-02-16 NOTE — TELEPHONE ENCOUNTER
CRP is mildly elevated compared with previous result 9/23/20. Currently taking 5 mg Prednisone daily for PMR. She is scheduled to get her 2nd COVID vaccine on 2/26/21. Continues to have mid back discomfort. Dr. Kavita Lr is out of the office.  Will discuss wi

## 2021-02-16 NOTE — TELEPHONE ENCOUNTER
Discussed with Dr. Inés Osorio. Will have patient take short steroid burst, then return to 5 mg daily. Okay to get 2nd vaccine next week as scheduled. May have a similar response to 2nd vaccine. Should follow up if she has a reaction.  Spoke with patient

## 2021-02-19 ENCOUNTER — TELEPHONE (OUTPATIENT)
Dept: FAMILY MEDICINE CLINIC | Facility: CLINIC | Age: 76
End: 2021-02-19

## 2021-02-19 DIAGNOSIS — E78.2 MIXED HYPERLIPIDEMIA: Primary | ICD-10-CM

## 2021-02-19 DIAGNOSIS — Z00.00 LABORATORY EXAMINATION ORDERED AS PART OF A ROUTINE GENERAL MEDICAL EXAMINATION: ICD-10-CM

## 2021-02-19 RX ORDER — CYCLOBENZAPRINE HCL 10 MG
10 TABLET ORAL NIGHTLY PRN
Qty: 10 TABLET | Refills: 0 | Status: SHIPPED | OUTPATIENT
Start: 2021-02-19 | End: 2021-02-22

## 2021-02-19 NOTE — TELEPHONE ENCOUNTER
Explained to patient regarding new order for the muscle relaxer. She is almost in tears asking what to do for the pain as she is having difficulty moving.  Inquired about what she is currently doing and she reports the prednisone, heat and trying to move sl

## 2021-02-19 NOTE — TELEPHONE ENCOUNTER
If needed, she can take the muscle relaxant during the day but it can make her drowsy. Would also take extra strength Tylenol.

## 2021-02-19 NOTE — TELEPHONE ENCOUNTER
Patient was here on Monday and had increase in prednisone. She states she can't sleep. No improvement. She is asking for a muscle relaxer or pain medication?

## 2021-02-19 NOTE — TELEPHONE ENCOUNTER
Can send muscle relaxant for bedtime. Should follow up with Dr. Hoa Hwang next week prior to her 2nd COVID vaccine to discuss.

## 2021-02-22 ENCOUNTER — OFFICE VISIT (OUTPATIENT)
Dept: FAMILY MEDICINE CLINIC | Facility: CLINIC | Age: 76
End: 2021-02-22
Payer: COMMERCIAL

## 2021-02-22 VITALS
RESPIRATION RATE: 12 BRPM | TEMPERATURE: 99 F | HEART RATE: 127 BPM | SYSTOLIC BLOOD PRESSURE: 144 MMHG | BODY MASS INDEX: 27.11 KG/M2 | WEIGHT: 153 LBS | HEIGHT: 63 IN | DIASTOLIC BLOOD PRESSURE: 80 MMHG | OXYGEN SATURATION: 97 %

## 2021-02-22 DIAGNOSIS — M35.3 POLYMYALGIA RHEUMATICA (HCC): ICD-10-CM

## 2021-02-22 DIAGNOSIS — M62.830 SPASM OF THORACIC BACK MUSCLE: Primary | ICD-10-CM

## 2021-02-22 PROCEDURE — 3079F DIAST BP 80-89 MM HG: CPT | Performed by: FAMILY MEDICINE

## 2021-02-22 PROCEDURE — 3008F BODY MASS INDEX DOCD: CPT | Performed by: FAMILY MEDICINE

## 2021-02-22 PROCEDURE — 99213 OFFICE O/P EST LOW 20 MIN: CPT | Performed by: FAMILY MEDICINE

## 2021-02-22 PROCEDURE — 3077F SYST BP >= 140 MM HG: CPT | Performed by: FAMILY MEDICINE

## 2021-02-22 RX ORDER — CYCLOBENZAPRINE HCL 10 MG
10 TABLET ORAL 3 TIMES DAILY
Qty: 90 TABLET | Refills: 0 | Status: SHIPPED | OUTPATIENT
Start: 2021-02-22 | End: 2021-03-16 | Stop reason: ALTCHOICE

## 2021-02-22 RX ORDER — SIMVASTATIN 10 MG
10 TABLET ORAL NIGHTLY
Qty: 90 TABLET | Refills: 1 | Status: SHIPPED | OUTPATIENT
Start: 2021-02-22 | End: 2021-08-09

## 2021-02-22 RX ORDER — SIMVASTATIN 10 MG
10 TABLET ORAL NIGHTLY
COMMUNITY
End: 2021-05-18

## 2021-02-23 NOTE — PROGRESS NOTES
Mery Hawkins is a 76year old female. Patient presents with:  Back Pain: inrm.  5      Chief Complaint Reviewed and Verified  Nursing Notes Reviewed and   Verified  Tobacco Reviewed  Allergies Reviewed  Medications Reviewed    Problem List Reviewed  Medica back     Objective   EXAM:   /80 (BP Location: Right arm, Patient Position: Sitting, Cuff Size: adult)   Pulse (!) 127   Temp 98.9 °F (37.2 °C) (Temporal)   Resp 12   Ht 5' 3\" (1.6 m)   Wt 153 lb (69.4 kg)   SpO2 97%   BMI 27.10 kg/m²  Body mass ind

## 2021-02-25 ENCOUNTER — HOSPITAL ENCOUNTER (OUTPATIENT)
Dept: GENERAL RADIOLOGY | Age: 76
Discharge: HOME | End: 2021-02-25
Attending: FAMILY MEDICINE
Payer: MEDICARE

## 2021-02-25 ENCOUNTER — TELEPHONE (OUTPATIENT)
Dept: FAMILY MEDICINE CLINIC | Facility: CLINIC | Age: 76
End: 2021-02-25

## 2021-02-25 DIAGNOSIS — M62.830 SPASM OF THORACIC BACK MUSCLE: Primary | ICD-10-CM

## 2021-02-25 DIAGNOSIS — M54.6 THORACOLUMBAR BACK PAIN: ICD-10-CM

## 2021-02-25 DIAGNOSIS — M62.830 SPASM OF THORACIC BACK MUSCLE: ICD-10-CM

## 2021-02-25 DIAGNOSIS — M54.50 THORACOLUMBAR BACK PAIN: ICD-10-CM

## 2021-02-25 DIAGNOSIS — Z85.3 HISTORY OF BREAST CANCER: ICD-10-CM

## 2021-02-25 PROCEDURE — 72072 X-RAY EXAM THORAC SPINE 3VWS: CPT | Performed by: FAMILY MEDICINE

## 2021-02-25 NOTE — TELEPHONE ENCOUNTER
JESU HAS SEEN DR FAIRBANKS FOR BACK PAIN, SHE IS ON MEDS TO HELP BUT IT DOESN'T SEEM TO BE HELPING. SHE IS WONDERING IF SHE NEEDS AN X-RAY?

## 2021-02-25 NOTE — TELEPHONE ENCOUNTER
Spoke with patient and she would like to have an xray done here. Patient transferred to front staff to schedule.

## 2021-02-25 NOTE — TELEPHONE ENCOUNTER
Thoracic spine xray--plain    Here    I will place order    She can go elsewhere if not able to schedule here  Only afternoons here

## 2021-02-26 ENCOUNTER — TELEPHONE (OUTPATIENT)
Dept: FAMILY MEDICINE CLINIC | Facility: CLINIC | Age: 76
End: 2021-02-26

## 2021-02-26 DIAGNOSIS — C50.012 MALIGNANT NEOPLASM INVOLVING BOTH NIPPLE AND AREOLA OF LEFT BREAST IN FEMALE, UNSPECIFIED ESTROGEN RECEPTOR STATUS (HCC): Primary | ICD-10-CM

## 2021-02-26 DIAGNOSIS — S22.070A COMPRESSION FRACTURE OF T9 VERTEBRA, INITIAL ENCOUNTER (HCC): ICD-10-CM

## 2021-02-26 DIAGNOSIS — R52 PAIN: ICD-10-CM

## 2021-02-26 DIAGNOSIS — Z85.3 HISTORY OF BREAST CANCER: ICD-10-CM

## 2021-02-27 ENCOUNTER — TELEPHONE (OUTPATIENT)
Dept: FAMILY MEDICINE CLINIC | Facility: CLINIC | Age: 76
End: 2021-02-27

## 2021-02-27 DIAGNOSIS — R52 PAIN: ICD-10-CM

## 2021-02-27 DIAGNOSIS — S22.070A COMPRESSION FRACTURE OF T9 VERTEBRA, INITIAL ENCOUNTER (HCC): Primary | ICD-10-CM

## 2021-02-27 NOTE — TELEPHONE ENCOUNTER
Spoke with patient and her . Informed her per note it looks like she is supposed to get an MRI done prior to kyphoplasty. Number given for central scheduling to schedule the MRI. The MRI has not been authorized yet in our system.  She is wanting a ca

## 2021-03-01 RX ORDER — HYDROCODONE BITARTRATE AND ACETAMINOPHEN 5; 325 MG/1; MG/1
1 TABLET ORAL EVERY 6 HOURS PRN
Qty: 28 TABLET | Refills: 0 | Status: SHIPPED | OUTPATIENT
Start: 2021-03-01 | End: 2021-03-24

## 2021-03-01 NOTE — TELEPHONE ENCOUNTER
Spoke to Mary regarding she has to do the MRI first. Will address with Silvano Sanchez if can be approved.

## 2021-03-08 ENCOUNTER — TELEPHONE (OUTPATIENT)
Dept: FAMILY MEDICINE CLINIC | Facility: CLINIC | Age: 76
End: 2021-03-08

## 2021-03-08 DIAGNOSIS — M54.50 THORACOLUMBAR BACK PAIN: ICD-10-CM

## 2021-03-08 DIAGNOSIS — S22.070A COMPRESSION FRACTURE OF T9 VERTEBRA, INITIAL ENCOUNTER (HCC): Primary | ICD-10-CM

## 2021-03-08 DIAGNOSIS — M54.6 THORACOLUMBAR BACK PAIN: ICD-10-CM

## 2021-03-08 LAB — AMB EXT COVID-19 RESULT: NOT DETECTED

## 2021-03-08 NOTE — TELEPHONE ENCOUNTER
Looking for prior auth for [facility] for a thoracic kyphoplasty and they are asking for a prior auth. Proc. Is Wed.  3-10-21   Fax: [phone]

## 2021-03-09 ENCOUNTER — TELEPHONE (OUTPATIENT)
Dept: FAMILY MEDICINE CLINIC | Facility: CLINIC | Age: 76
End: 2021-03-09

## 2021-03-09 NOTE — TELEPHONE ENCOUNTER
Sil with [facility] Thomas pre op is calling she would like Pat's last office visit notes faxed over to [phone], Pat's procedure is 3/10/21

## 2021-03-10 ENCOUNTER — TELEPHONE (OUTPATIENT)
Dept: FAMILY MEDICINE CLINIC | Facility: CLINIC | Age: 76
End: 2021-03-10

## 2021-03-10 NOTE — TELEPHONE ENCOUNTER
Back and forth the last 24 hours trying to get patients prior authorization. Faxed all information to [facility] at [phone]. It was pending clinical review. Dr. Lola Sharp NPI [id number]; Millicent Khan NPI [id number]; CPT 95535; Dx: s22.070A, C50.

## 2021-03-11 ENCOUNTER — MED REC SCAN ONLY (OUTPATIENT)
Dept: FAMILY MEDICINE CLINIC | Facility: CLINIC | Age: 76
End: 2021-03-11

## 2021-03-15 ENCOUNTER — TELEPHONE (OUTPATIENT)
Dept: FAMILY MEDICINE CLINIC | Facility: CLINIC | Age: 76
End: 2021-03-15

## 2021-03-15 NOTE — TELEPHONE ENCOUNTER
Same pain as before the surgery. Not in the spot of surgery but all around that spot. She is still taking tylenol and the muscle relaxer but is hesitant to take the opoid. She has appointment tomorrow.

## 2021-03-15 NOTE — TELEPHONE ENCOUNTER
JESU HAD A PROCEDURE  LAST WEEK WHERE THEY CEMENT A VERTIBRAE AND SHE IS IN A LOT OF PAIN, SHE IS WONDERING IF DR FAIRBANKS WOULD GIVE HER MEDS TO HELP? WITH DR Bar Yancey IS THE DR WHO DID IT.

## 2021-03-16 ENCOUNTER — OFFICE VISIT (OUTPATIENT)
Dept: FAMILY MEDICINE CLINIC | Facility: CLINIC | Age: 76
End: 2021-03-16
Payer: COMMERCIAL

## 2021-03-16 VITALS
OXYGEN SATURATION: 98 % | WEIGHT: 149 LBS | HEART RATE: 62 BPM | HEIGHT: 63 IN | BODY MASS INDEX: 26.4 KG/M2 | SYSTOLIC BLOOD PRESSURE: 120 MMHG | TEMPERATURE: 99 F | DIASTOLIC BLOOD PRESSURE: 80 MMHG

## 2021-03-16 DIAGNOSIS — M54.6 THORACOLUMBAR BACK PAIN: Primary | ICD-10-CM

## 2021-03-16 DIAGNOSIS — M54.50 THORACOLUMBAR BACK PAIN: Primary | ICD-10-CM

## 2021-03-16 DIAGNOSIS — S22.070S COMPRESSION FRACTURE OF T9 VERTEBRA, SEQUELA: ICD-10-CM

## 2021-03-16 PROCEDURE — 3074F SYST BP LT 130 MM HG: CPT | Performed by: FAMILY MEDICINE

## 2021-03-16 PROCEDURE — 3079F DIAST BP 80-89 MM HG: CPT | Performed by: FAMILY MEDICINE

## 2021-03-16 PROCEDURE — 3008F BODY MASS INDEX DOCD: CPT | Performed by: FAMILY MEDICINE

## 2021-03-16 PROCEDURE — 99213 OFFICE O/P EST LOW 20 MIN: CPT | Performed by: FAMILY MEDICINE

## 2021-03-16 RX ORDER — METHOCARBAMOL 750 MG/1
750 TABLET, FILM COATED ORAL 3 TIMES DAILY
Qty: 90 TABLET | Refills: 0 | Status: SHIPPED | OUTPATIENT
Start: 2021-03-16 | End: 2021-04-13

## 2021-03-16 NOTE — PROGRESS NOTES
HPI/Subjective:   Nate Stinson is a 76year old female who presents for Follow - Up (sore back. Piter Lopez )     Patient has a compression deformity of the back    t-9 vertebrae  Recent kyphoplasty  Successful    sHe states she has pain still that is a tightness in t Temp 98.5 °F (36.9 °C) (Temporal)   Ht 5' 3\" (1.6 m)   Wt 149 lb (67.6 kg)   SpO2 98%   BMI 26.39 kg/m²  Estimated body mass index is 26.39 kg/m² as calculated from the following:    Height as of this encounter: 5' 3\" (1.6 m).     Weight as of this encoun

## 2021-03-24 DIAGNOSIS — S22.070A COMPRESSION FRACTURE OF T9 VERTEBRA, INITIAL ENCOUNTER (HCC): ICD-10-CM

## 2021-03-24 DIAGNOSIS — R52 PAIN: ICD-10-CM

## 2021-03-24 RX ORDER — HYDROCODONE BITARTRATE AND ACETAMINOPHEN 5; 325 MG/1; MG/1
1 TABLET ORAL EVERY 6 HOURS PRN
Qty: 28 TABLET | Refills: 0 | Status: SHIPPED | OUTPATIENT
Start: 2021-03-24 | End: 2021-04-01

## 2021-03-24 NOTE — TELEPHONE ENCOUNTER
HYDROcodone-acetaminophen 5-325 MG Oral Tab     [facility] #[id number] - [place], [place] - [address] [place] [address] [address] [place] [address] ([address]), [phone], [phone]    LAST VISIT WAS ON 03/16/2021 - BACK PAIN

## 2021-04-01 DIAGNOSIS — S22.070A COMPRESSION FRACTURE OF T9 VERTEBRA, INITIAL ENCOUNTER (HCC): ICD-10-CM

## 2021-04-01 DIAGNOSIS — R52 PAIN: ICD-10-CM

## 2021-04-01 RX ORDER — HYDROCODONE BITARTRATE AND ACETAMINOPHEN 5; 325 MG/1; MG/1
1 TABLET ORAL EVERY 6 HOURS PRN
Qty: 28 TABLET | Refills: 0 | Status: SHIPPED | OUTPATIENT
Start: 2021-04-01 | End: 2021-04-13

## 2021-04-01 NOTE — TELEPHONE ENCOUNTER
Spoke with patient who states she was taking Acetaminophen-Hydrocodone for pain post kyphoplasty. She states she was doing better, and then on Tuesday she lifted a pot off the stove and pulled a muscle in her back.  She is needing a refill of her Hydrocodon

## 2021-04-01 NOTE — TELEPHONE ENCOUNTER
HYDROcodone-acetaminophen 5-325 MG Oral Tab    PLEASE SEND REFILL TO [facility] [place].   PLEASE CALL JESU, SHE HAS A QUESTION ABOUT THE MEDICATION

## 2021-04-07 ENCOUNTER — TELEPHONE (OUTPATIENT)
Dept: FAMILY MEDICINE CLINIC | Facility: CLINIC | Age: 76
End: 2021-04-07

## 2021-04-07 DIAGNOSIS — M54.6 THORACOLUMBAR BACK PAIN: ICD-10-CM

## 2021-04-07 DIAGNOSIS — M62.830 SPASM OF THORACIC BACK MUSCLE: ICD-10-CM

## 2021-04-07 DIAGNOSIS — M54.50 THORACOLUMBAR BACK PAIN: ICD-10-CM

## 2021-04-07 DIAGNOSIS — S22.070A COMPRESSION FRACTURE OF T9 VERTEBRA, INITIAL ENCOUNTER (HCC): Primary | ICD-10-CM

## 2021-04-07 NOTE — TELEPHONE ENCOUNTER
Patient states she is still having back pain where the vertebrae was fixed. She is wondering if there is another vertabrae that could be damaged? She wants to know if physical therapy would help.

## 2021-04-12 ENCOUNTER — TELEPHONE (OUTPATIENT)
Dept: PHYSICAL THERAPY | Facility: HOSPITAL | Age: 76
End: 2021-04-12

## 2021-04-13 ENCOUNTER — OFFICE VISIT (OUTPATIENT)
Dept: PHYSICAL THERAPY | Age: 76
End: 2021-04-13
Attending: FAMILY MEDICINE
Payer: COMMERCIAL

## 2021-04-13 DIAGNOSIS — M54.6 THORACOLUMBAR BACK PAIN: ICD-10-CM

## 2021-04-13 DIAGNOSIS — S22.070S COMPRESSION FRACTURE OF T9 VERTEBRA, SEQUELA: ICD-10-CM

## 2021-04-13 DIAGNOSIS — M54.50 THORACOLUMBAR BACK PAIN: ICD-10-CM

## 2021-04-13 DIAGNOSIS — M62.830 SPASM OF THORACIC BACK MUSCLE: ICD-10-CM

## 2021-04-13 DIAGNOSIS — R52 PAIN: ICD-10-CM

## 2021-04-13 DIAGNOSIS — S22.070A COMPRESSION FRACTURE OF T9 VERTEBRA, INITIAL ENCOUNTER (HCC): ICD-10-CM

## 2021-04-13 PROCEDURE — 97162 PT EVAL MOD COMPLEX 30 MIN: CPT

## 2021-04-13 PROCEDURE — 97110 THERAPEUTIC EXERCISES: CPT

## 2021-04-13 RX ORDER — HYDROCODONE BITARTRATE AND ACETAMINOPHEN 5; 325 MG/1; MG/1
1 TABLET ORAL EVERY 6 HOURS PRN
Qty: 28 TABLET | Refills: 0 | Status: SHIPPED | OUTPATIENT
Start: 2021-04-13 | End: 2021-04-14

## 2021-04-13 RX ORDER — METHOCARBAMOL 750 MG/1
750 TABLET, FILM COATED ORAL 3 TIMES DAILY
Qty: 90 TABLET | Refills: 0 | Status: SHIPPED | OUTPATIENT
Start: 2021-04-13 | End: 2021-04-14

## 2021-04-13 NOTE — TELEPHONE ENCOUNTER
HYDROcodone-acetaminophen 5-325 MG Oral Tab      methocarbamol 750 MG Oral Tab    PLEASE SEND REFILL TO [facility]

## 2021-04-13 NOTE — PROGRESS NOTES
SPINE EVALUATION:   Referring Physician: Dr. Porter  Diagnosis: Thoracic kyphoplasty      Date of Service: 4/13/2021     PATIENT SUMMARY   Zach Mendoza is a 76year old female who presents to therapy today with complaints of thoracic pain.  Patient is one Precautions:  None  OBJECTIVE:   Observation/Posture:  Increased thoracic kyphosis  Neuro Screen: Light touch intact B UE    Lumbar AROM:   Flexion: 70  Extension: 25  Sidebending: R 20; L 20  Rotation: R 60; L 60    Palpation: Increased thoracic parasp cuing to promote advancement of therex   · Pt will demonstrate good understanding of proper posture and body mechanics to decrease pain and improve spinal safety   · Pt will improve lumbar spine AROM flexion to >80 deg to allow increase ease with bending f

## 2021-04-13 NOTE — TELEPHONE ENCOUNTER
LOV 03/16/2021    LAST LAB 02/15/2021    LAST RX  Hydrocodone #28 R0 04/01/2021  Methocarbamol #90 R0 03/16/2021    Next OV   Future Appointments   Date Time Provider Vahe Hurt   4/16/2021  1:15 PM GRISELDA Ying   4/19/2021  6:30

## 2021-04-14 ENCOUNTER — TELEPHONE (OUTPATIENT)
Dept: FAMILY MEDICINE CLINIC | Facility: CLINIC | Age: 76
End: 2021-04-14

## 2021-04-14 DIAGNOSIS — M54.50 THORACOLUMBAR BACK PAIN: ICD-10-CM

## 2021-04-14 DIAGNOSIS — M54.6 THORACOLUMBAR BACK PAIN: ICD-10-CM

## 2021-04-14 DIAGNOSIS — S22.070A COMPRESSION FRACTURE OF T9 VERTEBRA, INITIAL ENCOUNTER (HCC): ICD-10-CM

## 2021-04-14 DIAGNOSIS — R52 PAIN: ICD-10-CM

## 2021-04-14 DIAGNOSIS — S22.070S COMPRESSION FRACTURE OF T9 VERTEBRA, SEQUELA: ICD-10-CM

## 2021-04-14 RX ORDER — METHOCARBAMOL 750 MG/1
750 TABLET, FILM COATED ORAL 3 TIMES DAILY
Qty: 90 TABLET | Refills: 0 | Status: SHIPPED | OUTPATIENT
Start: 2021-04-14 | End: 2021-05-18

## 2021-04-14 RX ORDER — HYDROCODONE BITARTRATE AND ACETAMINOPHEN 5; 325 MG/1; MG/1
1 TABLET ORAL EVERY 6 HOURS PRN
Qty: 28 TABLET | Refills: 0 | Status: SHIPPED | OUTPATIENT
Start: 2021-04-14 | End: 2021-05-03

## 2021-04-14 NOTE — TELEPHONE ENCOUNTER
Prescriptions for patients pain medication were sent to the wrong pharmacy. They needed to go to [facility] and were sent to [facility].

## 2021-04-15 NOTE — TELEPHONE ENCOUNTER
Script was sent to [facility]. Receipt confirmed by pharmacy. Call placed to [facility] however the line was busy. Will call back to confirm at a later time.

## 2021-04-15 NOTE — TELEPHONE ENCOUNTER
Spoke with pharmacy who states they do have the script, but per insurance, they can not fill it until tomorrow unless they want to pay cash for it today. Call placed to patient who states her  called the pharmacy and they told them the same thing.  Pratik Holloway

## 2021-04-15 NOTE — TELEPHONE ENCOUNTER
Pt. Said she called [facility] in [place] and they are telling her they did not receive any scripts for pt.  Please call them in

## 2021-04-16 ENCOUNTER — OFFICE VISIT (OUTPATIENT)
Dept: PHYSICAL THERAPY | Age: 76
End: 2021-04-16
Attending: FAMILY MEDICINE
Payer: COMMERCIAL

## 2021-04-16 PROCEDURE — 97140 MANUAL THERAPY 1/> REGIONS: CPT

## 2021-04-16 PROCEDURE — 97110 THERAPEUTIC EXERCISES: CPT

## 2021-04-16 NOTE — PROGRESS NOTES
Dx: S/P thoracic hypoplasty T9         Insurance (Authorized # of Visits):  Medicare/medical necessity           Authorizing Physician: Dr. Mony Christopher  Next MD visit: none scheduled  Fall Risk: standard         Precautions: n/a             Subjective: Heading in PT     Plan: Progress scapular strength  Date: 4/16/2021  TX#: 2/10 Date:                 TX#: 3/ Date:                 TX#: 4/ Date:                 TX#: 5/ Date:    Tx#: 6/   THERAPEUTIC EX  UBE 3/3  pec stretch 3x30\"  tband row red 3x10  tband ext re

## 2021-04-19 ENCOUNTER — OFFICE VISIT (OUTPATIENT)
Dept: PHYSICAL THERAPY | Age: 76
End: 2021-04-19
Attending: FAMILY MEDICINE
Payer: COMMERCIAL

## 2021-04-19 PROCEDURE — 97140 MANUAL THERAPY 1/> REGIONS: CPT

## 2021-04-19 PROCEDURE — 97110 THERAPEUTIC EXERCISES: CPT

## 2021-04-19 NOTE — PROGRESS NOTES
Dx: S/P thoracic hypoplasty T9         Insurance (Authorized # of Visits):  Medicare/medical necessity           Authorizing Physician: Dr. Mony Christopher  Next MD visit: none scheduled  Fall Risk: standard         Precautions: n/a             Subjective: Less pa posturing and decreased pain with reaching   · Pt will be independent and compliant with comprehensive HEP to maintain progress achieved in PT     Plan: Progress scapular strength  Date: 4/16/2021  TX#: 2/10 Date:   4/19/2021              TX#: 3/10 Date:

## 2021-04-22 ENCOUNTER — OFFICE VISIT (OUTPATIENT)
Dept: PHYSICAL THERAPY | Age: 76
End: 2021-04-22
Attending: FAMILY MEDICINE
Payer: COMMERCIAL

## 2021-04-22 PROCEDURE — 97110 THERAPEUTIC EXERCISES: CPT

## 2021-04-22 PROCEDURE — 97140 MANUAL THERAPY 1/> REGIONS: CPT

## 2021-04-23 NOTE — PROGRESS NOTES
Dx: S/P thoracic hypoplasty T9         Insurance (Authorized # of Visits):  Medicare/medical necessity           Authorizing Physician: Dr. Arcadio Molina MD visit: none scheduled  Fall Risk: standard         Precautions: n/a             Subjective: Continu with reaching   · Pt will be independent and compliant with comprehensive HEP to maintain progress achieved in PT     Plan: Progress scapular strength  Date: 4/16/2021  TX#: 2/10 Date:   4/19/2021              TX#: 3/10 Date: 4/22/2021                TX#:

## 2021-04-26 ENCOUNTER — OFFICE VISIT (OUTPATIENT)
Dept: PHYSICAL THERAPY | Age: 76
End: 2021-04-26
Attending: FAMILY MEDICINE
Payer: COMMERCIAL

## 2021-04-26 PROCEDURE — 97140 MANUAL THERAPY 1/> REGIONS: CPT

## 2021-04-26 PROCEDURE — 97110 THERAPEUTIC EXERCISES: CPT

## 2021-04-26 NOTE — PROGRESS NOTES
Dx: S/P thoracic hypoplasty T9         Insurance (Authorized # of Visits):  Medicare/medical necessity           Authorizing Physician: Dr. Michael Truong  Next MD visit: none scheduled  Fall Risk: standard         Precautions: n/a             Subjective: Continu · Pt will be independent and compliant with comprehensive HEP to maintain progress achieved in PT     Plan: Progress scapular strength  Date: 4/16/2021  TX#: 2/10 Date:   4/19/2021              TX#: 3/10 Date: 4/22/2021                TX#: 4/10 Date:

## 2021-04-29 ENCOUNTER — OFFICE VISIT (OUTPATIENT)
Dept: PHYSICAL THERAPY | Age: 76
End: 2021-04-29
Attending: FAMILY MEDICINE
Payer: COMMERCIAL

## 2021-04-29 PROCEDURE — 97110 THERAPEUTIC EXERCISES: CPT

## 2021-04-29 PROCEDURE — 97140 MANUAL THERAPY 1/> REGIONS: CPT

## 2021-04-29 NOTE — PROGRESS NOTES
Dx: S/P thoracic hypoplasty T9         Insurance (Authorized # of Visits):  Medicare/medical necessity           Authorizing Physician: Dr. Ping Tucker  Next MD visit: none scheduled  Fall Risk: standard         Precautions: n/a             Subjective: Make ba TX#: 3/10 Date: 4/22/2021                TX#: 4/10 Date:    4/26/2021             TX#: 5/10 Date: 4/29/2021  Tx#: 6/10   THERAPEUTIC EX  UBE 3/3  pec stretch 3x30\"  tband row red 3x10  tband ext red 3x10  Stand flex 2# 2x10 B  SB wall walks with

## 2021-05-03 ENCOUNTER — OFFICE VISIT (OUTPATIENT)
Dept: PHYSICAL THERAPY | Age: 76
End: 2021-05-03
Attending: FAMILY MEDICINE
Payer: COMMERCIAL

## 2021-05-03 DIAGNOSIS — S22.070A COMPRESSION FRACTURE OF T9 VERTEBRA, INITIAL ENCOUNTER (HCC): ICD-10-CM

## 2021-05-03 DIAGNOSIS — R52 PAIN: ICD-10-CM

## 2021-05-03 PROCEDURE — 97110 THERAPEUTIC EXERCISES: CPT

## 2021-05-03 PROCEDURE — 97140 MANUAL THERAPY 1/> REGIONS: CPT

## 2021-05-03 RX ORDER — HYDROCODONE BITARTRATE AND ACETAMINOPHEN 5; 325 MG/1; MG/1
1 TABLET ORAL EVERY 6 HOURS PRN
Qty: 28 TABLET | Refills: 0 | Status: SHIPPED | OUTPATIENT
Start: 2021-05-03 | End: 2021-05-12

## 2021-05-03 NOTE — PROGRESS NOTES
Dx: S/P thoracic hypoplasty T9         Insurance (Authorized # of Visits):  Medicare/medical necessity           Authorizing Physician: Dr. Belgica Collier  Next MD visit: none scheduled  Fall Risk: standard         Precautions: n/a             Subjective: No real with comprehensive HEP to maintain progress achieved in PT     Plan: Progress scapular strength  Date: 4/16/2021  TX#: 2/10 Date:   4/19/2021              TX#: 3/10 Date: 4/22/2021                TX#: 4/10 Date:    4/26/2021             TX#: 5/10 Date: 4/2 scap retract, stand flexion    Charges: man therapy, therapeutic ex 2       Total Timed Treatment: 40 min  Total Treatment Time: 40 min

## 2021-05-03 NOTE — TELEPHONE ENCOUNTER
LOV 03/16/2021    LAST LAB 02/15/2021    LAST RX  Hydrocodone #28 R0 04/14/2021    Next OV   Future Appointments   Date Time Provider Vahe Hurt   5/3/2021  6:30 PM GRISELDA Ceron   5/6/2021  6:30 PM GRISELDA Ceron

## 2021-05-06 ENCOUNTER — OFFICE VISIT (OUTPATIENT)
Dept: PHYSICAL THERAPY | Age: 76
End: 2021-05-06
Attending: FAMILY MEDICINE
Payer: COMMERCIAL

## 2021-05-06 PROCEDURE — 97110 THERAPEUTIC EXERCISES: CPT

## 2021-05-06 PROCEDURE — 97140 MANUAL THERAPY 1/> REGIONS: CPT

## 2021-05-06 NOTE — PROGRESS NOTES
Dx: S/P thoracic hypoplasty T9         Insurance (Authorized # of Visits):  Medicare/medical necessity           Authorizing Physician: Dr. Pastora Livingston  Next MD visit: none scheduled  Fall Risk: standard         Precautions: n/a             Subjective: Felt go posturing and decreased pain with reaching   · Pt will be independent and compliant with comprehensive HEP to maintain progress achieved in PT     Plan: Progress scapular strength  Date:   4/19/2021              TX#: 3/10 Date: 4/22/2021                TX# HEP: row, shoulder ext, scap retract, stand flexion    Charges: man therapy, therapeutic ex 1       Total Timed Treatment: 30 min  Total Treatment Time: 30 min

## 2021-05-10 ENCOUNTER — HOSPITAL ENCOUNTER (OUTPATIENT)
Age: 76
Discharge: HOME | End: 2021-05-10
Payer: COMMERCIAL

## 2021-05-10 ENCOUNTER — TELEPHONE (OUTPATIENT)
Dept: FAMILY MEDICINE CLINIC | Facility: CLINIC | Age: 76
End: 2021-05-10

## 2021-05-10 ENCOUNTER — APPOINTMENT (OUTPATIENT)
Dept: GENERAL RADIOLOGY | Age: 76
End: 2021-05-10
Attending: NURSE PRACTITIONER
Payer: COMMERCIAL

## 2021-05-10 ENCOUNTER — OFFICE VISIT (OUTPATIENT)
Dept: PHYSICAL THERAPY | Age: 76
End: 2021-05-10
Attending: FAMILY MEDICINE
Payer: COMMERCIAL

## 2021-05-10 VITALS
SYSTOLIC BLOOD PRESSURE: 164 MMHG | OXYGEN SATURATION: 98 % | DIASTOLIC BLOOD PRESSURE: 92 MMHG | TEMPERATURE: 98 F | HEART RATE: 97 BPM | RESPIRATION RATE: 18 BRPM | HEIGHT: 64 IN | BODY MASS INDEX: 25.61 KG/M2 | WEIGHT: 150 LBS

## 2021-05-10 DIAGNOSIS — M54.9 BACK PAIN WITHOUT RADIATION: Primary | ICD-10-CM

## 2021-05-10 PROCEDURE — 97140 MANUAL THERAPY 1/> REGIONS: CPT

## 2021-05-10 PROCEDURE — 97014 ELECTRIC STIMULATION THERAPY: CPT

## 2021-05-10 PROCEDURE — 72110 X-RAY EXAM L-2 SPINE 4/>VWS: CPT | Performed by: NURSE PRACTITIONER

## 2021-05-10 PROCEDURE — 97110 THERAPEUTIC EXERCISES: CPT

## 2021-05-10 PROCEDURE — 99213 OFFICE O/P EST LOW 20 MIN: CPT | Performed by: NURSE PRACTITIONER

## 2021-05-10 NOTE — TELEPHONE ENCOUNTER
JESU THINKS SHE HAS A COLLAPSED VERTIBRATE AGAIN, SHE IS ASKING FOR X-RAY, [facility] (X-RAY TECH) IS OUT TODAY.

## 2021-05-10 NOTE — PROGRESS NOTES
Dx: S/P thoracic hypoplasty T9         Insurance (Authorized # of Visits):  Medicare/medical necessity           Authorizing Physician: Dr. Pastora Livingston  Next MD visit: none scheduled  Fall Risk: standard         Precautions: n/a             Subjective: Continu compliant with comprehensive HEP to maintain progress achieved in PT     Plan: Reassess  Date:   4/19/2021              TX#: 3/10 Date: 4/22/2021                TX#: 4/10 Date:    4/26/2021             TX#: 5/10 Date: 4/29/2021  Tx#: 6/10 Date: 5/3/2021  T paraspinals/QL 16'  Gentle PA mobs T-spine 4' MANUAL THERAPY  STM lumbar paraspinals/QL 16'  Gentle PA mobs T-spine 4'         E-STIM (UNATTD)  IFC lumbar region  12'            HEP: row, shoulder ext, scap retract, stand flexion    Charges: man ther

## 2021-05-10 NOTE — ED PROVIDER NOTES
Patient Seen in: [facility]      History   Patient presents with:  Back Pain    Stated Complaint: lower back pain    HPI/Subjective:   77-year-old female presents today with complaints of lower back pain.   Has a history of a T7 compression fra Constitutional:       Appearance: Normal appearance. Comments: Patient does appear to be in pain with movement. HENT:      Mouth/Throat:      Mouth: Mucous membranes are moist.   Cardiovascular:      Rate and Rhythm: Normal rate.    Pulmonary: 5/10/2021 at 10:58 AM              MDM     Patient resents today with complaints of lower back pain that started about 5 days ago. Denies any injuries to the back. No numbness or weakness.   Currently going to PT and had surgery on the T7 about 3 months a

## 2021-05-10 NOTE — ED INITIAL ASSESSMENT (HPI)
Pt here c/o lower back pain since last Wed. Denies any injury. Currently going to PT. Had surgery to T7 a couple months ago.

## 2021-05-10 NOTE — TELEPHONE ENCOUNTER
Woke up last Wednesday with pain in her back. Physical thereapy worked on the area of the pain but did not help. She states it is the same pain as she had before with the collapse vertabre. Patient is requesting an x-ray.  Patient advised we do not have x-r

## 2021-05-12 DIAGNOSIS — R52 PAIN: ICD-10-CM

## 2021-05-12 DIAGNOSIS — S22.070A COMPRESSION FRACTURE OF T9 VERTEBRA, INITIAL ENCOUNTER (HCC): ICD-10-CM

## 2021-05-12 RX ORDER — HYDROCODONE BITARTRATE AND ACETAMINOPHEN 5; 325 MG/1; MG/1
1 TABLET ORAL EVERY 6 HOURS PRN
Qty: 28 TABLET | Refills: 0 | Status: SHIPPED | OUTPATIENT
Start: 2021-05-12 | End: 2021-05-18 | Stop reason: DRUGHIGH

## 2021-05-12 NOTE — TELEPHONE ENCOUNTER
LOV 03/16/2021    LAST LAB 02/15/2021    LAST RX  Hydrocodone #28 R0 05/03/2021    Next OV   Future Appointments   Date Time Provider Vahe Hurt   5/13/2021  5:45 PM GRISELDA Lara   5/17/2021  4:15 PM GRISELDA Lara

## 2021-05-13 ENCOUNTER — OFFICE VISIT (OUTPATIENT)
Dept: PHYSICAL THERAPY | Age: 76
End: 2021-05-13
Attending: FAMILY MEDICINE
Payer: COMMERCIAL

## 2021-05-13 DIAGNOSIS — Z00.00 LABORATORY EXAMINATION ORDERED AS PART OF A ROUTINE GENERAL MEDICAL EXAMINATION: ICD-10-CM

## 2021-05-13 PROCEDURE — 97140 MANUAL THERAPY 1/> REGIONS: CPT

## 2021-05-13 PROCEDURE — 97110 THERAPEUTIC EXERCISES: CPT

## 2021-05-13 RX ORDER — PREDNISONE 1 MG/1
5 TABLET ORAL DAILY
Qty: 90 TABLET | Refills: 0 | Status: SHIPPED | OUTPATIENT
Start: 2021-05-13 | End: 2021-08-09

## 2021-05-13 NOTE — PROGRESS NOTES
Progress Summary  Pt has attended 10 visits in Physical Therapy.    Dx: S/P thoracic hypoplasty T9         Insurance (Authorized # of Visits):  Medicare/medical necessity           Authorizing Physician: Dr. Michael Truong  Next MD visit: none scheduled  Fall Ris for work and home activities -On going  · Pt will demonstrate improved core strength to be able to perform cleaning tasks with <2/10 pain  -O going  · Pt will demonstrate improved cervical intrinsic strength to 5/5 to allow improved cervical stabilization 2# 2x10 B  SB wall walks with LT raise 2x10 B  Seated thoracic extension over ball x10 THERAPEUTIC EX  UBE 3/3  pec stretch 3x30\"  tband row red 3x10  tband ext red 3x10  Stand flex 2# 2x10 B  Standing scaption 2# 2x10 B  SB wall walks with LT raise 2x10

## 2021-05-13 NOTE — TELEPHONE ENCOUNTER
predniSONE 5 MG Oral Tab    [phone], [phone]    LAST VISIT WAS ON 03/16/2021

## 2021-05-13 NOTE — TELEPHONE ENCOUNTER
Last office visit: 3/16/21  Last refill: 2/16/21  Labs Due: 8/19/21  Future Appointments   Date Time Provider Vahe Hurt   5/13/2021  5:45 PM GRISELDA García   5/17/2021  4:15 PM GRISELDA García   5/18/2021  4:30 PM

## 2021-05-17 ENCOUNTER — OFFICE VISIT (OUTPATIENT)
Dept: PHYSICAL THERAPY | Age: 76
End: 2021-05-17
Attending: FAMILY MEDICINE
Payer: COMMERCIAL

## 2021-05-17 PROCEDURE — 97140 MANUAL THERAPY 1/> REGIONS: CPT

## 2021-05-17 PROCEDURE — 97035 APP MDLTY 1+ULTRASOUND EA 15: CPT

## 2021-05-17 NOTE — PROGRESS NOTES
Dx: S/P thoracic hypoplasty T9         Insurance (Authorized # of Visits):  Medicare/medical necessity           Authorizing Physician: Dr. Hoa Hwang  Next MD visit: none scheduled  Fall Risk: standard         Precautions: n/a             Subjective: Sonali Villanueva improved upright posturing and decreased pain with reaching -On going  · Pt will be independent and compliant with comprehensive HEP to maintain progress achieved in PT -Met and on going      Plan: Progress core stability as tolerated  Date:    4/26/2021 18'  Gentle PA mobs T-spine 5'       E-STIM (UNATTD)  IFC lumbar region  12'  ULTRASOUND  Lumbar paraspinals 1.2 w/cm2 8'            HEP: row, shoulder ext, scap retract, stand flexion    Charges: man therapy 2, ultrasound     Total Timed Treatment:

## 2021-05-18 ENCOUNTER — OFFICE VISIT (OUTPATIENT)
Dept: FAMILY MEDICINE CLINIC | Facility: CLINIC | Age: 76
End: 2021-05-18
Payer: COMMERCIAL

## 2021-05-18 VITALS
BODY MASS INDEX: 25.61 KG/M2 | HEART RATE: 116 BPM | TEMPERATURE: 98 F | OXYGEN SATURATION: 97 % | DIASTOLIC BLOOD PRESSURE: 82 MMHG | SYSTOLIC BLOOD PRESSURE: 132 MMHG | WEIGHT: 150 LBS | RESPIRATION RATE: 16 BRPM | HEIGHT: 64 IN

## 2021-05-18 DIAGNOSIS — M54.6 THORACOLUMBAR BACK PAIN: ICD-10-CM

## 2021-05-18 DIAGNOSIS — S22.070A COMPRESSION FRACTURE OF T9 VERTEBRA, INITIAL ENCOUNTER (HCC): ICD-10-CM

## 2021-05-18 DIAGNOSIS — S22.070S COMPRESSION FRACTURE OF T9 VERTEBRA, SEQUELA: ICD-10-CM

## 2021-05-18 DIAGNOSIS — M54.50 THORACOLUMBAR BACK PAIN: ICD-10-CM

## 2021-05-18 DIAGNOSIS — R52 PAIN: ICD-10-CM

## 2021-05-18 PROCEDURE — 3075F SYST BP GE 130 - 139MM HG: CPT | Performed by: FAMILY MEDICINE

## 2021-05-18 PROCEDURE — 3079F DIAST BP 80-89 MM HG: CPT | Performed by: FAMILY MEDICINE

## 2021-05-18 PROCEDURE — 3008F BODY MASS INDEX DOCD: CPT | Performed by: FAMILY MEDICINE

## 2021-05-18 PROCEDURE — 99213 OFFICE O/P EST LOW 20 MIN: CPT | Performed by: FAMILY MEDICINE

## 2021-05-18 RX ORDER — HYDROCODONE BITARTRATE AND ACETAMINOPHEN 7.5; 325 MG/1; MG/1
1 TABLET ORAL EVERY 6 HOURS PRN
Qty: 60 TABLET | Refills: 0 | Status: SHIPPED | OUTPATIENT
Start: 2021-05-18 | End: 2021-06-03

## 2021-05-18 RX ORDER — METHOCARBAMOL 750 MG/1
750 TABLET, FILM COATED ORAL 3 TIMES DAILY
Qty: 90 TABLET | Refills: 0 | Status: SHIPPED | OUTPATIENT
Start: 2021-05-18 | End: 2021-07-15

## 2021-05-19 NOTE — PROGRESS NOTES
HPI/Subjective:   Kirill Gibbs is a 76year old female who presents for Urgent Care F/u     Patient had compression fracture earlier this year and then kyphoplasty    She was better later  But recurs now with back pain and therefore she went to urgent care auscultation  CARDIO: RRR without murmur  GI: good BS's,no masses, HSM or tenderness  EXTREMITIES: no cyanosis, clubbing or edema    Assessment & Plan:   1. Thoracolumbar back pain  -     Methocarbamol;  Take 1 tablet (750 mg total) by mouth 3 (three) times

## 2021-05-20 ENCOUNTER — OFFICE VISIT (OUTPATIENT)
Dept: PHYSICAL THERAPY | Age: 76
End: 2021-05-20
Attending: FAMILY MEDICINE
Payer: COMMERCIAL

## 2021-05-20 PROCEDURE — 97140 MANUAL THERAPY 1/> REGIONS: CPT

## 2021-05-20 PROCEDURE — 97035 APP MDLTY 1+ULTRASOUND EA 15: CPT

## 2021-05-20 NOTE — PROGRESS NOTES
Dx: S/P thoracic hypoplasty T9         Insurance (Authorized # of Visits):  Medicare/medical necessity           Authorizing Physician: Dr. Ricki Miguel  Next MD visit: none scheduled  Fall Risk: standard         Precautions: n/a             Subjective: LB fe ext red 3x10  Stand flex 2# 2x10 B  Standing scaption 2# 2x10 B  SB wall walks with LT raise 2x10 B  Seated thoracic extension over ball x10  Hor abd yellow 3x5  Lift/chop yellow plyoball 2x5 B THERAPEUTIC EX  UBE 3/3  pec stretch 3x30\"  SB wall walks wit

## 2021-05-24 ENCOUNTER — TELEPHONE (OUTPATIENT)
Dept: FAMILY MEDICINE CLINIC | Facility: CLINIC | Age: 76
End: 2021-05-24

## 2021-05-24 NOTE — TELEPHONE ENCOUNTER
Nancy  has been having back issues and her sister gave her CBD oil that she mixes with a cream and puts on her back.   Nancy Weber would like to speak with you about this please call

## 2021-05-24 NOTE — TELEPHONE ENCOUNTER
Patient wanted to notify this office that she started taking CBD infused lotion that she applies to her back to help with pain. She states she does notice a difference and wanted to make sure that it was ok with her pain pill and muscle relaxer.

## 2021-05-29 ENCOUNTER — TELEPHONE (OUTPATIENT)
Dept: FAMILY MEDICINE CLINIC | Facility: CLINIC | Age: 76
End: 2021-05-29

## 2021-05-29 DIAGNOSIS — T38.6X5A ADVERSE EFFECT OF ANTIGONADOTROPHINS, ANTIESTROGENS, ANTIANDROGENS, NOT ELSEWHERE CLASSIFIED, INITIAL ENCOUNTER: ICD-10-CM

## 2021-05-29 DIAGNOSIS — M54.6 CHRONIC MIDLINE THORACIC BACK PAIN: ICD-10-CM

## 2021-05-29 DIAGNOSIS — M54.6 THORACOLUMBAR BACK PAIN: Primary | ICD-10-CM

## 2021-05-29 DIAGNOSIS — G89.29 CHRONIC MIDLINE THORACIC BACK PAIN: ICD-10-CM

## 2021-05-29 DIAGNOSIS — M54.50 THORACOLUMBAR BACK PAIN: Primary | ICD-10-CM

## 2021-05-29 DIAGNOSIS — S22.070S COMPRESSION FRACTURE OF T9 VERTEBRA, SEQUELA: ICD-10-CM

## 2021-05-29 DIAGNOSIS — C50.912 LOBULAR CARCINOMA OF LEFT BREAST (HCC): ICD-10-CM

## 2021-05-29 DIAGNOSIS — M81.0 AGE-RELATED OSTEOPOROSIS WITHOUT CURRENT PATHOLOGICAL FRACTURE: ICD-10-CM

## 2021-05-29 NOTE — TELEPHONE ENCOUNTER
Patient is having issues with her back again. Having a lot of pain. Can she take more of the pain medication than Dr Stephen Escobar has prescribed? I did advise patient that she could go to the [facility] in [facility] or the Immediate care in [facility].  Patient wanted

## 2021-05-29 NOTE — TELEPHONE ENCOUNTER
Patient wants to know if she can take her medication more often. She would like to take her medication every 4 hours instead of 6 hours. Advised of side effects that could happen. She is also requesting another MRI to see if the x-ray missed something.

## 2021-06-01 NOTE — TELEPHONE ENCOUNTER
MRI ORDERED  NOT CERTAIN THIS IS APPROVED THROUGH INSURER  BUT ORDERED    ALSO can take every 5 hours---if this does not work  I will try the 5 mg hydocodone every 4 hours

## 2021-06-01 NOTE — TELEPHONE ENCOUNTER
Patient notified that an order was sent over to [facility] [place] for an MRI. Also advised that most she can take her norco is every 4 hours. Patient verbalized understanding.

## 2021-06-02 ENCOUNTER — OFFICE VISIT (OUTPATIENT)
Dept: PHYSICAL THERAPY | Age: 76
End: 2021-06-02
Attending: FAMILY MEDICINE
Payer: COMMERCIAL

## 2021-06-02 PROCEDURE — 97110 THERAPEUTIC EXERCISES: CPT

## 2021-06-02 PROCEDURE — 97035 APP MDLTY 1+ULTRASOUND EA 15: CPT

## 2021-06-02 NOTE — PROGRESS NOTES
Dx: S/P thoracic hypoplasty T9         Insurance (Authorized # of Visits):  Medicare/medical necessity           Authorizing Physician: Dr. Montgomery ref.  provider found  Next MD visit: none scheduled  Fall Risk: standard         Precautions: n/a             Bower 3/3  pec stretch 3x30\"  SB wall walks with LT raise 2x10 B  tband row red 3x10  tband ext red 3x10 THERAPEUTIC EX  UBE 3/3  pec stretch 3x30\"  SB wall walks with LT raise 2x10 B  tband row red 3x10  tband ext red 3x10      MANUAL THERAPY  STM lumbar para

## 2021-06-03 ENCOUNTER — TELEPHONE (OUTPATIENT)
Dept: FAMILY MEDICINE CLINIC | Facility: CLINIC | Age: 76
End: 2021-06-03

## 2021-06-03 RX ORDER — HYDROCODONE BITARTRATE AND ACETAMINOPHEN 7.5; 325 MG/1; MG/1
1 TABLET ORAL EVERY 4 HOURS PRN
Qty: 42 TABLET | Refills: 0 | Status: SHIPPED | OUTPATIENT
Start: 2021-06-03 | End: 2021-06-11

## 2021-06-03 RX ORDER — GABAPENTIN 100 MG/1
100 CAPSULE ORAL 3 TIMES DAILY
Qty: 90 CAPSULE | Refills: 0 | Status: SHIPPED | OUTPATIENT
Start: 2021-06-03 | End: 2021-07-01

## 2021-06-03 NOTE — TELEPHONE ENCOUNTER
Spoke with patient who states she is having muscle spasms and can hardly move. She takes Norco 7.5-325 Q 4 hours PRN for pain. She states this isn't touching the pain. She is also having constipation. Miralax is not helping.  Advised to take Colace BID unti

## 2021-06-03 NOTE — TELEPHONE ENCOUNTER
Spoke with Dr. Kiah Zavala who agrees with the Colace BID. If patient does not feel better by tomorrow morning, she should call and he will prescribe something.  Dr. Kiah Zavala also states patient should start taking Gabapentin 100 mg PO TID to help with muscle sp

## 2021-06-07 ENCOUNTER — OFFICE VISIT (OUTPATIENT)
Dept: PHYSICAL THERAPY | Age: 76
End: 2021-06-07
Attending: FAMILY MEDICINE
Payer: COMMERCIAL

## 2021-06-07 PROCEDURE — 97140 MANUAL THERAPY 1/> REGIONS: CPT

## 2021-06-07 PROCEDURE — 97035 APP MDLTY 1+ULTRASOUND EA 15: CPT

## 2021-06-07 NOTE — PROGRESS NOTES
Dx: S/P thoracic hypoplasty T9         Insurance (Authorized # of Visits):  Medicare/medical necessity           Authorizing Physician: Dr. Garza Has  Next MD visit: none scheduled  Fall Risk: standard         Precautions: n/a             Subjective:  It is EX  UBE 3/3  pec stretch 3x30\"  SB wall walks with LT raise 2x10 B  tband row red 3x10  tband ext red 3x10       MANUAL THERAPY  STM lumbar paraspinals/QL 16'  Gentle PA mobs T-spine 4' MANUAL THERAPY  STM lumbar paraspinals/QL 16'  Gentle PA mobs T-spine

## 2021-06-08 ENCOUNTER — TELEPHONE (OUTPATIENT)
Dept: FAMILY MEDICINE CLINIC | Facility: CLINIC | Age: 76
End: 2021-06-08

## 2021-06-08 DIAGNOSIS — M54.6 THORACOLUMBAR BACK PAIN: Primary | ICD-10-CM

## 2021-06-08 DIAGNOSIS — M81.0 AGE-RELATED OSTEOPOROSIS WITHOUT CURRENT PATHOLOGICAL FRACTURE: ICD-10-CM

## 2021-06-08 DIAGNOSIS — C50.912 LOBULAR CARCINOMA OF LEFT BREAST (HCC): ICD-10-CM

## 2021-06-08 DIAGNOSIS — T38.6X5A ADVERSE EFFECT OF ANTIGONADOTROPHINS, ANTIESTROGENS, ANTIANDROGENS, NOT ELSEWHERE CLASSIFIED, INITIAL ENCOUNTER: ICD-10-CM

## 2021-06-08 DIAGNOSIS — C50.012 MALIGNANT NEOPLASM INVOLVING BOTH NIPPLE AND AREOLA OF LEFT BREAST IN FEMALE, UNSPECIFIED ESTROGEN RECEPTOR STATUS (HCC): ICD-10-CM

## 2021-06-08 DIAGNOSIS — M54.50 THORACOLUMBAR BACK PAIN: Primary | ICD-10-CM

## 2021-06-08 DIAGNOSIS — S22.070S COMPRESSION FRACTURE OF T9 VERTEBRA, SEQUELA: ICD-10-CM

## 2021-06-08 NOTE — TELEPHONE ENCOUNTER
Phone call to p2p  Dr Rene Dinero    Prefers MRI with contrast    Will get back to me on approval    Case [id number]    Phone [phone]  Option 3    6/8/2021 12:20 PM     Approved now for MRI thoracic  With/w-o contrast  And  Mri Lumbar with/w-0 contrast

## 2021-06-09 ENCOUNTER — TELEPHONE (OUTPATIENT)
Dept: FAMILY MEDICINE CLINIC | Facility: CLINIC | Age: 76
End: 2021-06-09

## 2021-06-09 DIAGNOSIS — S22.070S COMPRESSION FRACTURE OF T9 VERTEBRA, SEQUELA: ICD-10-CM

## 2021-06-09 DIAGNOSIS — M54.50 THORACOLUMBAR BACK PAIN: ICD-10-CM

## 2021-06-09 DIAGNOSIS — R52 PAIN: ICD-10-CM

## 2021-06-09 DIAGNOSIS — M54.6 THORACOLUMBAR BACK PAIN: ICD-10-CM

## 2021-06-09 DIAGNOSIS — G89.29 CHRONIC MIDLINE THORACIC BACK PAIN: ICD-10-CM

## 2021-06-09 DIAGNOSIS — M54.6 CHRONIC MIDLINE THORACIC BACK PAIN: ICD-10-CM

## 2021-06-09 NOTE — TELEPHONE ENCOUNTER
Patient has several issues  There is a t-9 fracture  That was the original---there is some movement behind this causes a slight stenosis    There is a t-8 fracture newer but not fresh --but importantly t-10 and 11 fracture that are more acute    There is a

## 2021-06-11 RX ORDER — HYDROCODONE BITARTRATE AND ACETAMINOPHEN 7.5; 325 MG/1; MG/1
1 TABLET ORAL EVERY 4 HOURS PRN
Qty: 42 TABLET | Refills: 0 | Status: SHIPPED | OUTPATIENT
Start: 2021-06-11 | End: 2021-06-22

## 2021-06-11 NOTE — TELEPHONE ENCOUNTER
Advised patient that she is authorized to see Dr. Marii Fischer. Information given and patient verbalized understanding. Patient requested refill on Norco as she is out.      LOV05/18/2021    LAST LAB 02/15/2021    LAST RX  Hydrocodone #42 r0 06/03/2021    N

## 2021-06-14 ENCOUNTER — OFFICE VISIT (OUTPATIENT)
Dept: PHYSICAL THERAPY | Age: 76
End: 2021-06-14
Attending: FAMILY MEDICINE
Payer: COMMERCIAL

## 2021-06-14 PROCEDURE — 97035 APP MDLTY 1+ULTRASOUND EA 15: CPT

## 2021-06-14 PROCEDURE — 97140 MANUAL THERAPY 1/> REGIONS: CPT

## 2021-06-14 NOTE — PROGRESS NOTES
Dx: S/P thoracic hypoplasty T9         Insurance (Authorized # of Visits):  Medicare/medical necessity           Authorizing Physician: Dr. Tasneem Fonseca  Next MD visit: none scheduled  Fall Risk: standard         Precautions: n/a             Subjective: MRI s wall walks with LT raise 2x10 B  tband row red 3x10  tband ext red 3x10 THERAPEUTIC EX  UBE 3/3  pec stretch 3x30\"  SB wall walks with LT raise 2x10 B  tband row red 3x10  tband ext red 3x10        MANUAL THERAPY  STM lumbar paraspinals/QL 16'  Gentle PA

## 2021-06-16 ENCOUNTER — OFFICE VISIT (OUTPATIENT)
Dept: SURGERY | Facility: CLINIC | Age: 76
End: 2021-06-16
Payer: COMMERCIAL

## 2021-06-16 ENCOUNTER — TELEPHONE (OUTPATIENT)
Dept: SURGERY | Facility: CLINIC | Age: 76
End: 2021-06-16

## 2021-06-16 VITALS
HEIGHT: 64 IN | WEIGHT: 150 LBS | BODY MASS INDEX: 25.61 KG/M2 | DIASTOLIC BLOOD PRESSURE: 78 MMHG | SYSTOLIC BLOOD PRESSURE: 122 MMHG

## 2021-06-16 DIAGNOSIS — S22.080A CLOSED WEDGE COMPRESSION FRACTURE OF T11 VERTEBRA, INITIAL ENCOUNTER (HCC): ICD-10-CM

## 2021-06-16 DIAGNOSIS — S22.060A CLOSED WEDGE COMPRESSION FRACTURE OF T8 VERTEBRA, INITIAL ENCOUNTER (HCC): Primary | ICD-10-CM

## 2021-06-16 DIAGNOSIS — S22.080A COMPRESSION FRACTURE OF T12 VERTEBRA, INITIAL ENCOUNTER (HCC): ICD-10-CM

## 2021-06-16 PROCEDURE — 3078F DIAST BP <80 MM HG: CPT | Performed by: PHYSICIAN ASSISTANT

## 2021-06-16 PROCEDURE — 3074F SYST BP LT 130 MM HG: CPT | Performed by: PHYSICIAN ASSISTANT

## 2021-06-16 PROCEDURE — 99204 OFFICE O/P NEW MOD 45 MIN: CPT | Performed by: PHYSICIAN ASSISTANT

## 2021-06-16 PROCEDURE — 3008F BODY MASS INDEX DOCD: CPT | Performed by: PHYSICIAN ASSISTANT

## 2021-06-16 NOTE — TELEPHONE ENCOUNTER
Imaging give by patient when arrived for appointment. Imaging downloaded into [facility] system. Disc given back to patient at check out.

## 2021-06-16 NOTE — PROGRESS NOTES
Simon Waters back against a step  Slipped on some ice  In February her back starting hurting  Got her Covid shot and thought maybe some muscle pain from that.   Pain didn't go away, thought it was her polymyalgia  Was given steroids, that didn't work, then she [employer]

## 2021-06-16 NOTE — PROGRESS NOTES
Patient: Lucretia Augustin  Medical Record Number: [id number]  PCP: Deidre Christie MD      HISTORY OF CHIEF COMPLAINT:    Lucretia Augustin is a 76year old female, who presents for multiple compression fractures.  She had a fall in December, but had no significant pa Sig Dispense Refill   • HYDROcodone-acetaminophen 7.5-325 MG Oral Tab Take 1 tablet by mouth every 4 (four) hours as needed for Pain. 42 tablet 0   • gabapentin 100 MG Oral Cap Take 1 capsule (100 mg total) by mouth 3 (three) times daily.  90 capsule 0   • to represent more recent acute to subacute fractures.  Minimal disc bulge and slight retropulsion of the superior posterior T11 vertebral body causes mild stenosis at the T10/T11 level.  There is also mild to moderate stenosis at T11/T12 due to disc bulge greater than right fluid signal centrally at the renal charo.  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 The patient indicates understanding of these issues and agrees to the plan. Thank you very much.    Respectfully yours,    Ele Lyman

## 2021-06-17 ENCOUNTER — TELEPHONE (OUTPATIENT)
Dept: PHYSICAL THERAPY | Facility: HOSPITAL | Age: 76
End: 2021-06-17

## 2021-06-17 ENCOUNTER — APPOINTMENT (OUTPATIENT)
Dept: PHYSICAL THERAPY | Age: 76
End: 2021-06-17
Attending: FAMILY MEDICINE
Payer: COMMERCIAL

## 2021-06-21 ENCOUNTER — APPOINTMENT (OUTPATIENT)
Dept: PHYSICAL THERAPY | Age: 76
End: 2021-06-21
Attending: FAMILY MEDICINE
Payer: COMMERCIAL

## 2021-06-22 RX ORDER — HYDROCODONE BITARTRATE AND ACETAMINOPHEN 7.5; 325 MG/1; MG/1
1 TABLET ORAL EVERY 4 HOURS PRN
Qty: 42 TABLET | Refills: 0 | Status: SHIPPED | OUTPATIENT
Start: 2021-06-22 | End: 2021-07-01

## 2021-06-22 NOTE — TELEPHONE ENCOUNTER
LOV 05/18/2021    LAST LAB 02/15/2021    LAST RX  Hydrocodone #42 R0 06/11/2021    Next OV   Future Appointments   Date Time Provider Vahe Hurt   7/28/2021  1:10 PM IRASEMA Denis [facility] [place]     [facility]

## 2021-06-24 ENCOUNTER — APPOINTMENT (OUTPATIENT)
Dept: PHYSICAL THERAPY | Age: 76
End: 2021-06-24
Attending: FAMILY MEDICINE
Payer: COMMERCIAL

## 2021-07-01 RX ORDER — HYDROCODONE BITARTRATE AND ACETAMINOPHEN 7.5; 325 MG/1; MG/1
1 TABLET ORAL EVERY 4 HOURS PRN
Qty: 42 TABLET | Refills: 0 | Status: SHIPPED | OUTPATIENT
Start: 2021-07-01 | End: 2021-07-02

## 2021-07-01 RX ORDER — GABAPENTIN 100 MG/1
100 CAPSULE ORAL 3 TIMES DAILY
Qty: 90 CAPSULE | Refills: 0 | Status: SHIPPED | OUTPATIENT
Start: 2021-07-01 | End: 2021-07-02

## 2021-07-01 NOTE — TELEPHONE ENCOUNTER
LOV :3-    LAST LAB: 2-    LAST RX:    Medication Quantity Refills Start End   gabapentin 100 MG Oral Cap 90 capsule 0 6/3/2021 7/3/2021   Sig:   Take 1 capsule (100 mg total) by mouth        Medication Quantity Refills Start End   HYDROcodon

## 2021-07-02 ENCOUNTER — OFFICE VISIT (OUTPATIENT)
Dept: FAMILY MEDICINE CLINIC | Facility: CLINIC | Age: 76
End: 2021-07-02
Payer: COMMERCIAL

## 2021-07-02 VITALS
BODY MASS INDEX: 23 KG/M2 | WEIGHT: 136.63 LBS | DIASTOLIC BLOOD PRESSURE: 78 MMHG | RESPIRATION RATE: 12 BRPM | SYSTOLIC BLOOD PRESSURE: 130 MMHG | HEART RATE: 92 BPM | TEMPERATURE: 98 F

## 2021-07-02 DIAGNOSIS — S22.070S COMPRESSION FRACTURE OF T9 VERTEBRA, SEQUELA: Primary | ICD-10-CM

## 2021-07-02 DIAGNOSIS — M54.50 THORACOLUMBAR BACK PAIN: ICD-10-CM

## 2021-07-02 DIAGNOSIS — G89.29 CHRONIC MIDLINE THORACIC BACK PAIN: ICD-10-CM

## 2021-07-02 DIAGNOSIS — R52 PAIN: ICD-10-CM

## 2021-07-02 DIAGNOSIS — M54.6 THORACOLUMBAR BACK PAIN: ICD-10-CM

## 2021-07-02 DIAGNOSIS — M54.6 CHRONIC MIDLINE THORACIC BACK PAIN: ICD-10-CM

## 2021-07-02 DIAGNOSIS — M81.0 AGE-RELATED OSTEOPOROSIS WITHOUT CURRENT PATHOLOGICAL FRACTURE: ICD-10-CM

## 2021-07-02 PROCEDURE — 3075F SYST BP GE 130 - 139MM HG: CPT | Performed by: FAMILY MEDICINE

## 2021-07-02 PROCEDURE — 3078F DIAST BP <80 MM HG: CPT | Performed by: FAMILY MEDICINE

## 2021-07-02 PROCEDURE — 99214 OFFICE O/P EST MOD 30 MIN: CPT | Performed by: FAMILY MEDICINE

## 2021-07-02 RX ORDER — HYDROCODONE BITARTRATE AND ACETAMINOPHEN 7.5; 325 MG/1; MG/1
1 TABLET ORAL EVERY 4 HOURS PRN
Qty: 42 TABLET | Refills: 0 | Status: SHIPPED | OUTPATIENT
Start: 2021-07-02 | End: 2021-07-10

## 2021-07-02 RX ORDER — GABAPENTIN 100 MG/1
100 CAPSULE ORAL 3 TIMES DAILY
Qty: 90 CAPSULE | Refills: 0 | Status: SHIPPED | OUTPATIENT
Start: 2021-07-02 | End: 2021-07-02

## 2021-07-05 RX ORDER — GABAPENTIN 100 MG/1
100 CAPSULE ORAL 3 TIMES DAILY
Qty: 24 CAPSULE | Refills: 0 | Status: SHIPPED | OUTPATIENT
Start: 2021-07-05 | End: 2021-07-13

## 2021-07-05 NOTE — PROGRESS NOTES
HPI/Subjective:   Kody Lin is a 76year old female who presents for Medication Follow-Up (                              Room 5.)     Here for evaluation  Patient dealing with back pain  Has 2 types  Upper thoracic has several compression fracture  And well nourished,in mild  apparent distress  SKIN: no rashes,no suspicious lesions  EXTREMITIES: wears back brace   Tender in l4-5 area  No significant tenderness in the thoracic spine  Gait normal with no aid      Assessment & Plan:   1.  Compression fractur

## 2021-07-06 ENCOUNTER — TELEPHONE (OUTPATIENT)
Dept: SURGERY | Facility: CLINIC | Age: 76
End: 2021-07-06

## 2021-07-06 NOTE — TELEPHONE ENCOUNTER
Patient has recent compression fractures and is being monitored for these. Injections are reasonable, but most likely will need these compression fractures healed prior. I recommend continued use of brace as instructed by Tiffanie Devine.      Complete XR imagi

## 2021-07-06 NOTE — TELEPHONE ENCOUNTER
saw PCP today and he suggested maybe I could get cortizone injections to help with back pain. not lower back but . Kenneth Tobin ... Do you think this would help?

## 2021-07-06 NOTE — TELEPHONE ENCOUNTER
Called to inform patient of message below. She verbalized understanding. She has her x-ray scheduled for 7-21 and a follow up apt on 7-22.

## 2021-07-10 RX ORDER — HYDROCODONE BITARTRATE AND ACETAMINOPHEN 7.5; 325 MG/1; MG/1
1 TABLET ORAL EVERY 4 HOURS PRN
Qty: 42 TABLET | Refills: 0 | Status: SHIPPED | OUTPATIENT
Start: 2021-07-10 | End: 2021-07-19

## 2021-07-10 NOTE — TELEPHONE ENCOUNTER
LOV 07/02/2021    LAST LAB02/15/2021    LAST RX  Hydrocodone #42 R0 07/02/2021    Next OV   Future Appointments   Date Time Provider Vahe Hurt   7/21/2021  4:30 PM SW XR RM 1 SW XRAY [place]   7/22/2021  2:00 PM Angela Childs EM

## 2021-07-15 DIAGNOSIS — M54.50 THORACOLUMBAR BACK PAIN: ICD-10-CM

## 2021-07-15 DIAGNOSIS — M54.6 THORACOLUMBAR BACK PAIN: ICD-10-CM

## 2021-07-15 DIAGNOSIS — S22.070S COMPRESSION FRACTURE OF T9 VERTEBRA, SEQUELA: ICD-10-CM

## 2021-07-15 RX ORDER — METHOCARBAMOL 750 MG/1
750 TABLET, FILM COATED ORAL 3 TIMES DAILY
Qty: 90 TABLET | Refills: 0 | Status: SHIPPED | OUTPATIENT
Start: 2021-07-15 | End: 2021-08-13

## 2021-07-15 NOTE — TELEPHONE ENCOUNTER
Last office visit: 7/2/21  Last refill: 5/18/21  Labs Due: 8/19/21  Future Appointments   Date Time Provider Vahe Hurt   7/21/2021  4:30 PM SW XR RM 1 SW XRAY [place]   7/22/2021  2:00 PM Gunjan Umanzor, IRASEMA BURRELL2 EM[facility] [employer]

## 2021-07-19 RX ORDER — HYDROCODONE BITARTRATE AND ACETAMINOPHEN 7.5; 325 MG/1; MG/1
1 TABLET ORAL EVERY 4 HOURS PRN
Qty: 42 TABLET | Refills: 0 | Status: SHIPPED | OUTPATIENT
Start: 2021-07-19 | End: 2021-07-26

## 2021-07-19 NOTE — TELEPHONE ENCOUNTER
LOV 07/02/2021    LAST LAB 02/15/2021    LAST RX  Hydrocodone #42 R0 07/10/2021    Next OV   Future Appointments   Date Time Provider Vahe Hurt   7/21/2021  4:30 PM SW XR RM 1 SW XRAY [place]   7/22/2021  2:00 PM Gunjan Hinson, IRASEMA ALLEN

## 2021-07-21 ENCOUNTER — HOSPITAL ENCOUNTER (OUTPATIENT)
Dept: GENERAL RADIOLOGY | Age: 76
Discharge: HOME | End: 2021-07-21
Attending: PHYSICIAN ASSISTANT
Payer: COMMERCIAL

## 2021-07-21 DIAGNOSIS — S22.060A CLOSED WEDGE COMPRESSION FRACTURE OF T8 VERTEBRA, INITIAL ENCOUNTER (HCC): ICD-10-CM

## 2021-07-21 DIAGNOSIS — S22.080A COMPRESSION FRACTURE OF T12 VERTEBRA, INITIAL ENCOUNTER (HCC): ICD-10-CM

## 2021-07-21 DIAGNOSIS — S22.080A CLOSED WEDGE COMPRESSION FRACTURE OF T11 VERTEBRA, INITIAL ENCOUNTER (HCC): ICD-10-CM

## 2021-07-21 PROCEDURE — 72072 X-RAY EXAM THORAC SPINE 3VWS: CPT | Performed by: PHYSICIAN ASSISTANT

## 2021-07-22 ENCOUNTER — OFFICE VISIT (OUTPATIENT)
Dept: SURGERY | Facility: CLINIC | Age: 76
End: 2021-07-22
Payer: COMMERCIAL

## 2021-07-22 VITALS
BODY MASS INDEX: 24 KG/M2 | OXYGEN SATURATION: 97 % | DIASTOLIC BLOOD PRESSURE: 72 MMHG | WEIGHT: 140 LBS | SYSTOLIC BLOOD PRESSURE: 118 MMHG | HEART RATE: 89 BPM | RESPIRATION RATE: 16 BRPM

## 2021-07-22 DIAGNOSIS — S22.060D COMPRESSION FRACTURE OF T7 VERTEBRA WITH ROUTINE HEALING, SUBSEQUENT ENCOUNTER: Primary | ICD-10-CM

## 2021-07-22 DIAGNOSIS — Z98.890 S/P KYPHOPLASTY: ICD-10-CM

## 2021-07-22 DIAGNOSIS — S22.060D COMPRESSION FRACTURE OF T8 VERTEBRA WITH ROUTINE HEALING, SUBSEQUENT ENCOUNTER: ICD-10-CM

## 2021-07-22 DIAGNOSIS — S22.080D COMPRESSION FRACTURE OF T11 VERTEBRA WITH ROUTINE HEALING, SUBSEQUENT ENCOUNTER: ICD-10-CM

## 2021-07-22 DIAGNOSIS — S22.080D COMPRESSION FRACTURE OF T12 VERTEBRA WITH ROUTINE HEALING, SUBSEQUENT ENCOUNTER: ICD-10-CM

## 2021-07-22 PROCEDURE — 3078F DIAST BP <80 MM HG: CPT | Performed by: PHYSICIAN ASSISTANT

## 2021-07-22 PROCEDURE — 3074F SYST BP LT 130 MM HG: CPT | Performed by: PHYSICIAN ASSISTANT

## 2021-07-22 PROCEDURE — 99214 OFFICE O/P EST MOD 30 MIN: CPT | Performed by: PHYSICIAN ASSISTANT

## 2021-07-22 NOTE — PATIENT INSTRUCTIONS
1. Contact the [facility] and make an appointment. Please complete the XR DEXA bone scan prior. If you complete the XR DEXA Bone scan at an outside location, please bring a copy of the disc with imaging and the report to the office visit.

## 2021-07-22 NOTE — PROGRESS NOTES
Patient: Charan Vance  Medical Record Number: [id number]  YOB: 1945  PCP: Karina Dyer MD    Reason for visit: Thoracic compression fracture follow-up    HISTORY OF CHIEF COMPLAINT:    Charan Vance is a very pleasant 76year old female who MG Oral Tab Take 2.5 mg by mouth daily. REVIEW OF SYSTEMS   Comprehensive review of systems done. Negative except what is outlined in the above HPI. PHYSICAL EXAMIMATION    weight is 140 lb (63.5 kg).  Her blood pressure is 118/72 and her pulse -2.5   Osteoporosis: T-score value at or below -2.5    Study Result    Narrative   PROCEDURE:  XR THORACIC SPINE (3 VIEWS) (CPT=72072)       TECHNIQUE:  AP, lateral, and swimmer's views of the thoracic spine were obtained.       COMPARISON:  [place], XR, op changes from T9 kyphoplasty noted. Compression deformity most likely visualized on MRI, appears stable.     - Ordered today:    - XR DEXA bone scan:     - Complete prior to seeing the [facility]    - XR thoracic spine:     - Complete 1 to 2

## 2021-07-26 ENCOUNTER — OFFICE VISIT (OUTPATIENT)
Dept: FAMILY MEDICINE CLINIC | Facility: CLINIC | Age: 76
End: 2021-07-26
Payer: COMMERCIAL

## 2021-07-26 VITALS
WEIGHT: 130 LBS | HEART RATE: 88 BPM | DIASTOLIC BLOOD PRESSURE: 78 MMHG | OXYGEN SATURATION: 97 % | SYSTOLIC BLOOD PRESSURE: 120 MMHG | HEIGHT: 64 IN | RESPIRATION RATE: 16 BRPM | TEMPERATURE: 99 F | BODY MASS INDEX: 22.2 KG/M2

## 2021-07-26 DIAGNOSIS — M54.6 CHRONIC MIDLINE THORACIC BACK PAIN: ICD-10-CM

## 2021-07-26 DIAGNOSIS — S22.070S COMPRESSION FRACTURE OF T9 VERTEBRA, SEQUELA: ICD-10-CM

## 2021-07-26 DIAGNOSIS — M54.50 THORACOLUMBAR BACK PAIN: Primary | ICD-10-CM

## 2021-07-26 DIAGNOSIS — F41.8 SITUATIONAL ANXIETY: ICD-10-CM

## 2021-07-26 DIAGNOSIS — M54.6 THORACOLUMBAR BACK PAIN: Primary | ICD-10-CM

## 2021-07-26 DIAGNOSIS — G89.29 CHRONIC MIDLINE THORACIC BACK PAIN: ICD-10-CM

## 2021-07-26 PROBLEM — S22.070A COMPRESSION FRACTURE OF T9 VERTEBRA (HCC): Status: ACTIVE | Noted: 2021-07-26

## 2021-07-26 PROCEDURE — 3074F SYST BP LT 130 MM HG: CPT | Performed by: NURSE PRACTITIONER

## 2021-07-26 PROCEDURE — 3008F BODY MASS INDEX DOCD: CPT | Performed by: NURSE PRACTITIONER

## 2021-07-26 PROCEDURE — 99214 OFFICE O/P EST MOD 30 MIN: CPT | Performed by: NURSE PRACTITIONER

## 2021-07-26 PROCEDURE — 3078F DIAST BP <80 MM HG: CPT | Performed by: NURSE PRACTITIONER

## 2021-07-26 RX ORDER — GABAPENTIN 100 MG/1
100 CAPSULE ORAL 2 TIMES DAILY
Refills: 0 | COMMUNITY
Start: 2021-07-25 | End: 2021-08-30

## 2021-07-26 RX ORDER — HYDROCODONE BITARTRATE AND ACETAMINOPHEN 7.5; 325 MG/1; MG/1
1 TABLET ORAL EVERY 4 HOURS PRN
Qty: 42 TABLET | Refills: 0 | Status: SHIPPED | OUTPATIENT
Start: 2021-07-26 | End: 2021-08-06

## 2021-07-26 NOTE — PATIENT INSTRUCTIONS
-Take Gabapentin 100 mg in the morning, 200 mg in the evening before bed  -Hydrocodone as needed for breakthrough pain

## 2021-07-26 NOTE — PROGRESS NOTES
HPI: HPI   Patient is here with her . She has been having difficulty sleeping for the past 3 nights. She wakes up sweating, feeling slightly panicky. She feels anxious and has trouble going back to sleep.  She had compression fracture in her thorac Vaping Use: Never used    Alcohol use: Never    Drug use: Never        REVIEW OF SYSTEMS:   Review of Systems   Constitutional: Positive for fatigue. Negative for chills and fever. Musculoskeletal: Positive for back pain.    Psychiatric/Behavioral: Positi

## 2021-08-03 ENCOUNTER — OFFICE VISIT (OUTPATIENT)
Dept: FAMILY MEDICINE CLINIC | Facility: CLINIC | Age: 76
End: 2021-08-03
Payer: COMMERCIAL

## 2021-08-03 VITALS
BODY MASS INDEX: 21.92 KG/M2 | TEMPERATURE: 98 F | DIASTOLIC BLOOD PRESSURE: 84 MMHG | RESPIRATION RATE: 12 BRPM | SYSTOLIC BLOOD PRESSURE: 138 MMHG | WEIGHT: 128.38 LBS | HEIGHT: 64 IN | HEART RATE: 100 BPM

## 2021-08-03 DIAGNOSIS — M54.50 THORACOLUMBAR BACK PAIN: Primary | ICD-10-CM

## 2021-08-03 DIAGNOSIS — G47.01 INSOMNIA DUE TO MEDICAL CONDITION: ICD-10-CM

## 2021-08-03 DIAGNOSIS — M54.6 THORACOLUMBAR BACK PAIN: Primary | ICD-10-CM

## 2021-08-03 DIAGNOSIS — F41.8 SITUATIONAL ANXIETY: ICD-10-CM

## 2021-08-03 DIAGNOSIS — S22.070S COMPRESSION FRACTURE OF T9 VERTEBRA, SEQUELA: ICD-10-CM

## 2021-08-03 PROCEDURE — 3079F DIAST BP 80-89 MM HG: CPT | Performed by: FAMILY MEDICINE

## 2021-08-03 PROCEDURE — 99214 OFFICE O/P EST MOD 30 MIN: CPT | Performed by: FAMILY MEDICINE

## 2021-08-03 PROCEDURE — 3075F SYST BP GE 130 - 139MM HG: CPT | Performed by: FAMILY MEDICINE

## 2021-08-03 PROCEDURE — 3008F BODY MASS INDEX DOCD: CPT | Performed by: FAMILY MEDICINE

## 2021-08-03 RX ORDER — TRAZODONE HYDROCHLORIDE 100 MG/1
100 TABLET ORAL NIGHTLY
Qty: 30 TABLET | Refills: 2 | Status: SHIPPED | OUTPATIENT
Start: 2021-08-03 | End: 2021-10-05 | Stop reason: ALTCHOICE

## 2021-08-04 ENCOUNTER — TELEPHONE (OUTPATIENT)
Dept: FAMILY MEDICINE CLINIC | Facility: CLINIC | Age: 76
End: 2021-08-04

## 2021-08-04 NOTE — TELEPHONE ENCOUNTER
Patient reports that the trazadone was too strong and she is going to cut the dose in half tonight to see if it will decrease her groggy feeling. Dr. Viri Mensah updated.

## 2021-08-06 RX ORDER — HYDROCODONE BITARTRATE AND ACETAMINOPHEN 7.5; 325 MG/1; MG/1
1 TABLET ORAL EVERY 4 HOURS PRN
Qty: 42 TABLET | Refills: 0 | Status: SHIPPED | OUTPATIENT
Start: 2021-08-06 | End: 2021-08-17

## 2021-08-06 NOTE — TELEPHONE ENCOUNTER
LOV 08/03/2021    LAST LAB 02/15/2021    LAST RX  Hydrocodone #42 R0 07/26/2021    Next OV   Future Appointments   Date Time Provider Vahe Hurt   9/2/2021  1:45 PM IRASEMA Chan [facility] [place]     PROTOCOL

## 2021-08-08 NOTE — PROGRESS NOTES
HPI/Subjective:   Mery Hawkins is a 76year old female who presents for Consult (medicine , not sleeping . anxiety .  inrm 5)     Patient has had several fracture  In the spine and she has needed pain medications    She is worried she may be dependent  She s Estimated body mass index is 22.04 kg/m² as calculated from the following:    Height as of this encounter: 5' 4\" (1.626 m). Weight as of this encounter: 128 lb 6 oz (58.2 kg).   GENERAL: well developed, well nourished,in no apparent distress  SKIN: no r

## 2021-08-09 DIAGNOSIS — Z00.00 LABORATORY EXAMINATION ORDERED AS PART OF A ROUTINE GENERAL MEDICAL EXAMINATION: ICD-10-CM

## 2021-08-09 RX ORDER — PREDNISONE 1 MG/1
5 TABLET ORAL DAILY
Qty: 90 TABLET | Refills: 0 | Status: SHIPPED | OUTPATIENT
Start: 2021-08-09 | End: 2021-11-13

## 2021-08-09 RX ORDER — SIMVASTATIN 10 MG
10 TABLET ORAL NIGHTLY
Qty: 90 TABLET | Refills: 1 | Status: SHIPPED | OUTPATIENT
Start: 2021-08-09 | End: 2022-02-05

## 2021-08-09 NOTE — TELEPHONE ENCOUNTER
Pt calls for refill of medications below:     Simvastatin: 2/22/21 #90 w/ 1 refill  Prednisone: 5/13/21 #90 w/ 0 refills    Last OV: 8/3/21  Last labs: 2/15/21    Future Appointments   Date Time Provider Vahe Hurt   9/2/2021  1:45 PM Radha Blankenship

## 2021-08-13 DIAGNOSIS — S22.070S COMPRESSION FRACTURE OF T9 VERTEBRA, SEQUELA: ICD-10-CM

## 2021-08-13 DIAGNOSIS — M54.50 THORACOLUMBAR BACK PAIN: ICD-10-CM

## 2021-08-13 DIAGNOSIS — M54.6 THORACOLUMBAR BACK PAIN: ICD-10-CM

## 2021-08-13 RX ORDER — METHOCARBAMOL 750 MG/1
750 TABLET, FILM COATED ORAL 3 TIMES DAILY
Qty: 90 TABLET | Refills: 0 | Status: SHIPPED | OUTPATIENT
Start: 2021-08-13 | End: 2021-09-12

## 2021-08-13 NOTE — TELEPHONE ENCOUNTER
LOV 08/03/2021    LAST LAB 04/02/2021    LAST RX  Methocarbamol #90 R0 07/15/2021    Next OV   Future Appointments   Date Time Provider Vahe Hurt   9/2/2021  1:45 PM IRASEMA Phillips [facility] [place]     PROTOCOL

## 2021-08-17 RX ORDER — HYDROCODONE BITARTRATE AND ACETAMINOPHEN 7.5; 325 MG/1; MG/1
1 TABLET ORAL EVERY 4 HOURS PRN
Qty: 42 TABLET | Refills: 0 | Status: SHIPPED | OUTPATIENT
Start: 2021-08-17 | End: 2021-08-27

## 2021-08-17 NOTE — TELEPHONE ENCOUNTER
LOV 08/03/2021    LAST LAB 04/02/2021    LAST RX  Hydrocodone #42 R0 08/06/2021    Next OV   Future Appointments   Date Time Provider Vahe Hurt   8/25/2021  2:30 PM OS DEXA RM1 OS DEXA [place]   8/31/2021  4:30 PM SW XR RM 1 SW XRAY [place]   9/2/2

## 2021-08-25 ENCOUNTER — HOSPITAL ENCOUNTER (OUTPATIENT)
Dept: BONE DENSITY | Age: 76
Discharge: HOME | End: 2021-08-25
Attending: PHYSICIAN ASSISTANT
Payer: COMMERCIAL

## 2021-08-25 DIAGNOSIS — S22.080D COMPRESSION FRACTURE OF T12 VERTEBRA WITH ROUTINE HEALING, SUBSEQUENT ENCOUNTER: ICD-10-CM

## 2021-08-25 DIAGNOSIS — S22.080D COMPRESSION FRACTURE OF T11 VERTEBRA WITH ROUTINE HEALING, SUBSEQUENT ENCOUNTER: ICD-10-CM

## 2021-08-25 DIAGNOSIS — S22.060D COMPRESSION FRACTURE OF T7 VERTEBRA WITH ROUTINE HEALING, SUBSEQUENT ENCOUNTER: ICD-10-CM

## 2021-08-25 DIAGNOSIS — S22.060D COMPRESSION FRACTURE OF T8 VERTEBRA WITH ROUTINE HEALING, SUBSEQUENT ENCOUNTER: ICD-10-CM

## 2021-08-25 DIAGNOSIS — Z98.890 S/P KYPHOPLASTY: ICD-10-CM

## 2021-08-25 PROCEDURE — 77080 DXA BONE DENSITY AXIAL: CPT | Performed by: PHYSICIAN ASSISTANT

## 2021-08-27 RX ORDER — HYDROCODONE BITARTRATE AND ACETAMINOPHEN 7.5; 325 MG/1; MG/1
1 TABLET ORAL EVERY 4 HOURS PRN
Qty: 42 TABLET | Refills: 0 | Status: SHIPPED | OUTPATIENT
Start: 2021-08-27 | End: 2021-09-07

## 2021-08-27 NOTE — TELEPHONE ENCOUNTER
LOV 08/03/2021    LAST LAB 08/14/2021    LAST RX  Hydrocodone #42 R0 08/17/2021    Next OV   Future Appointments   Date Time Provider Vahe Hurt   8/31/2021  4:30 PM SW XR RM 1 SW XRAY [place]   9/2/2021  1:45 PM Elvira Roche Both EM

## 2021-08-27 NOTE — TELEPHONE ENCOUNTER
[facility] and [facility] QUER[facility] AND VERIFIED TODAY

## 2021-08-30 RX ORDER — GABAPENTIN 100 MG/1
100 CAPSULE ORAL 2 TIMES DAILY
Qty: 180 CAPSULE | Refills: 1 | Status: SHIPPED | OUTPATIENT
Start: 2021-08-30 | End: 2021-10-05

## 2021-08-30 NOTE — TELEPHONE ENCOUNTER
LOV 08/03/2021    LAST LAB  08/14/2021-[facility]    LAST RX  Gabapentin #24 R0 07/05/2021    Next OV   Future Appointments   Date Time Provider Vahe Hurt   8/31/2021  4:30 PM SW XR RM 1 SW XRAY [place]   9/2/2021  1:45 PM Gunjan Suarez, P

## 2021-08-31 ENCOUNTER — HOSPITAL ENCOUNTER (OUTPATIENT)
Dept: GENERAL RADIOLOGY | Age: 76
Discharge: HOME | End: 2021-08-31
Attending: PHYSICIAN ASSISTANT
Payer: COMMERCIAL

## 2021-08-31 DIAGNOSIS — S22.080D COMPRESSION FRACTURE OF T12 VERTEBRA WITH ROUTINE HEALING, SUBSEQUENT ENCOUNTER: ICD-10-CM

## 2021-08-31 DIAGNOSIS — Z98.890 S/P KYPHOPLASTY: ICD-10-CM

## 2021-08-31 DIAGNOSIS — S22.060D COMPRESSION FRACTURE OF T8 VERTEBRA WITH ROUTINE HEALING, SUBSEQUENT ENCOUNTER: ICD-10-CM

## 2021-08-31 DIAGNOSIS — S22.080D COMPRESSION FRACTURE OF T11 VERTEBRA WITH ROUTINE HEALING, SUBSEQUENT ENCOUNTER: ICD-10-CM

## 2021-08-31 DIAGNOSIS — S22.060D COMPRESSION FRACTURE OF T7 VERTEBRA WITH ROUTINE HEALING, SUBSEQUENT ENCOUNTER: ICD-10-CM

## 2021-08-31 PROCEDURE — 72072 X-RAY EXAM THORAC SPINE 3VWS: CPT | Performed by: PHYSICIAN ASSISTANT

## 2021-09-02 ENCOUNTER — OFFICE VISIT (OUTPATIENT)
Dept: SURGERY | Facility: CLINIC | Age: 76
End: 2021-09-02
Payer: COMMERCIAL

## 2021-09-02 VITALS — HEART RATE: 103 BPM | DIASTOLIC BLOOD PRESSURE: 70 MMHG | SYSTOLIC BLOOD PRESSURE: 142 MMHG

## 2021-09-02 DIAGNOSIS — S22.060D COMPRESSION FRACTURE OF T8 VERTEBRA WITH ROUTINE HEALING, SUBSEQUENT ENCOUNTER: ICD-10-CM

## 2021-09-02 DIAGNOSIS — M62.81 MUSCLE WEAKNESS ON EXAMINATION: ICD-10-CM

## 2021-09-02 DIAGNOSIS — M21.372 LEFT FOOT DROP: Primary | ICD-10-CM

## 2021-09-02 DIAGNOSIS — R29.898 WEAKNESS OF LEFT FOOT: ICD-10-CM

## 2021-09-02 DIAGNOSIS — S22.080D COMPRESSION FRACTURE OF T11 VERTEBRA WITH ROUTINE HEALING, SUBSEQUENT ENCOUNTER: ICD-10-CM

## 2021-09-02 DIAGNOSIS — S22.060D COMPRESSION FRACTURE OF T7 VERTEBRA WITH ROUTINE HEALING, SUBSEQUENT ENCOUNTER: ICD-10-CM

## 2021-09-02 DIAGNOSIS — S22.080D COMPRESSION FRACTURE OF T12 VERTEBRA WITH ROUTINE HEALING, SUBSEQUENT ENCOUNTER: ICD-10-CM

## 2021-09-02 DIAGNOSIS — Z98.890 S/P KYPHOPLASTY: ICD-10-CM

## 2021-09-02 PROCEDURE — 3077F SYST BP >= 140 MM HG: CPT | Performed by: PHYSICIAN ASSISTANT

## 2021-09-02 PROCEDURE — 99214 OFFICE O/P EST MOD 30 MIN: CPT | Performed by: PHYSICIAN ASSISTANT

## 2021-09-02 PROCEDURE — 3078F DIAST BP <80 MM HG: CPT | Performed by: PHYSICIAN ASSISTANT

## 2021-09-02 RX ORDER — CYCLOBENZAPRINE HCL 10 MG
10 TABLET ORAL
COMMUNITY
End: 2021-10-05

## 2021-09-02 RX ORDER — TAMOXIFEN CITRATE 20 MG/1
20 TABLET ORAL DAILY
COMMUNITY
Start: 2021-08-19 | End: 2021-11-02

## 2021-09-02 NOTE — PROGRESS NOTES
Patient: Junior Guidry  Medical Record Number: [id number]  YOB: 1945  PCP: Stacey Castellano MD    Reason for visit: Lumbar follow up    HISTORY OF CHIEF COMPLAINT:    Junior Guidry is a very pleasant 76year old female who presents for thoracic Allergies   Current Medications:  Current Outpatient Medications   Medication Sig Dispense Refill   • cyclobenzaprine 10 MG Oral Tab Take 10 mg by mouth. • tamoxifen 20 MG Oral Tab Take 20 mg by mouth daily.      • gabapentin 100 MG Oral Cap Take 1 caps 5         5 5 5   Left       5         5       5         4- 5- 3     Tests:   Test Right   (POS or NEG) Left   (POS or NEG)   Clonus Neg Neg     DATA:   None    IMAGING:   Study Result    Narrative   PROCEDURE:  XR DEXA BONE DENSITOMETRY (CPT=77080)     on 8/25/2021 at 2:35 PM       Finalized by (CST): Greer Jose MD on 8/25/2021 at 2:36 PM      Study Result    Narrative   PROCEDURE:  XR THORACIC SPINE (3 VIEWS) (CPT=72072)       TECHNIQUE:  AP, lateral, and swimmer's views of the thoracic spine were Compression fracture of T12 vertebra with routine healing, subsequent encounter    PLAN:   1. Medication: None prescribed  2.  Imaging:    - Reviewed today:    - XR thoracic spine:     - Agree with radiology.    - Ordered today:    - MRI thoracic w + wo and

## 2021-09-03 ENCOUNTER — TELEPHONE (OUTPATIENT)
Dept: NEUROLOGY | Facility: CLINIC | Age: 76
End: 2021-09-03

## 2021-09-03 ENCOUNTER — TELEPHONE (OUTPATIENT)
Dept: FAMILY MEDICINE CLINIC | Facility: CLINIC | Age: 76
End: 2021-09-03

## 2021-09-03 NOTE — TELEPHONE ENCOUNTER
Lumbar MRI (12062) and Thoracic MRI (20945) ---    Based on the clinical information provided, this request has not demonstrated consistency with evidence-based clinical guidelines. Your Notification number request has been assigned Case Number [id number]. A Physician-to-Physician discussion is required to complete the Notification Process. The ordering physician must call [phone] and select option #3 to engage in a Physician-to-Physician discussion. Please ensure the physician has the case number provided above available when making this call.  If a Physician-to-Physician discussion is not conducted within 3 business days, this notification number request will be deemed  and you must reinitiate the Notification Process

## 2021-09-03 NOTE — TELEPHONE ENCOUNTER
VM left for patient advising the [facility] office is working on the MRI authorization at this time.

## 2021-09-07 RX ORDER — HYDROCODONE BITARTRATE AND ACETAMINOPHEN 7.5; 325 MG/1; MG/1
1 TABLET ORAL EVERY 4 HOURS PRN
Qty: 42 TABLET | Refills: 0 | Status: SHIPPED | OUTPATIENT
Start: 2021-09-07 | End: 2021-09-17

## 2021-09-07 NOTE — TELEPHONE ENCOUNTER
[facility] printed and [facility] QUERIED AND VERIFIED TODAY

## 2021-09-07 NOTE — TELEPHONE ENCOUNTER
LOV 08/03/2021    LAST LAB 08/14/2021-[facility]    LAST RX  Hydrocodone #42 R0 08/27/2021    Next OV   Future Appointments   Date Time Provider Vahe Hurt   9/16/2021  1:45 PM Lolly Loya PA-C [facility] ROHAN Pang   10/4/2021  2:20 PM Kirill

## 2021-09-11 DIAGNOSIS — S22.070S COMPRESSION FRACTURE OF T9 VERTEBRA, SEQUELA: ICD-10-CM

## 2021-09-11 DIAGNOSIS — M54.50 THORACOLUMBAR BACK PAIN: ICD-10-CM

## 2021-09-11 DIAGNOSIS — M54.6 THORACOLUMBAR BACK PAIN: ICD-10-CM

## 2021-09-11 NOTE — TELEPHONE ENCOUNTER
Last office visit: 8/3/21  Last refill: 8/13/21  Future Appointments   Date Time Provider Vahe Hurt   9/16/2021  1:45 PM Santhosh Baptiste PA-C [facility]   10/4/2021  2:20 PM Juhi Roy DO [facility] [place]

## 2021-09-12 RX ORDER — METHOCARBAMOL 750 MG/1
750 TABLET, FILM COATED ORAL 3 TIMES DAILY
Qty: 90 TABLET | Refills: 0 | Status: SHIPPED | OUTPATIENT
Start: 2021-09-12 | End: 2021-10-12

## 2021-09-17 RX ORDER — HYDROCODONE BITARTRATE AND ACETAMINOPHEN 7.5; 325 MG/1; MG/1
1 TABLET ORAL EVERY 4 HOURS PRN
Qty: 42 TABLET | Refills: 0 | Status: SHIPPED | OUTPATIENT
Start: 2021-09-17 | End: 2021-09-18

## 2021-09-17 NOTE — TELEPHONE ENCOUNTER
[facility] and [facility] QUERIED AND VERIFIED TODAY

## 2021-09-18 ENCOUNTER — TELEPHONE (OUTPATIENT)
Dept: FAMILY MEDICINE CLINIC | Facility: CLINIC | Age: 76
End: 2021-09-18

## 2021-09-18 RX ORDER — HYDROCODONE BITARTRATE AND ACETAMINOPHEN 7.5; 325 MG/1; MG/1
1 TABLET ORAL EVERY 4 HOURS PRN
Qty: 42 TABLET | Refills: 0 | Status: SHIPPED | OUTPATIENT
Start: 2021-09-18 | End: 2021-09-27

## 2021-09-18 NOTE — TELEPHONE ENCOUNTER
We checked with [facility] and they are back up and running. Will refill electronically. She is overdue to follow up with Dr. Jomar Rangel. Please have her schedule next week.  Last filled just 11 days ago

## 2021-09-22 ENCOUNTER — TELEPHONE (OUTPATIENT)
Dept: FAMILY MEDICINE CLINIC | Facility: CLINIC | Age: 76
End: 2021-09-22

## 2021-09-22 DIAGNOSIS — G47.01 INSOMNIA DUE TO MEDICAL CONDITION: ICD-10-CM

## 2021-09-22 DIAGNOSIS — S22.070S COMPRESSION FRACTURE OF T9 VERTEBRA, SEQUELA: ICD-10-CM

## 2021-09-22 DIAGNOSIS — G89.29 CHRONIC MIDLINE THORACIC BACK PAIN: ICD-10-CM

## 2021-09-22 DIAGNOSIS — F41.8 SITUATIONAL ANXIETY: ICD-10-CM

## 2021-09-22 DIAGNOSIS — M54.6 THORACOLUMBAR BACK PAIN: Primary | ICD-10-CM

## 2021-09-22 DIAGNOSIS — R52 PAIN: ICD-10-CM

## 2021-09-22 DIAGNOSIS — M54.6 CHRONIC MIDLINE THORACIC BACK PAIN: ICD-10-CM

## 2021-09-22 DIAGNOSIS — M54.50 THORACOLUMBAR BACK PAIN: Primary | ICD-10-CM

## 2021-09-22 NOTE — TELEPHONE ENCOUNTER
Nancy Weber called to report that she would like get off the opioids. She does not like the withdrawal she is feeling. She states she is no longer taking for the pain but the decreased anxiety she feels.  She states he it calming her down when she takes the medica

## 2021-09-22 NOTE — TELEPHONE ENCOUNTER
Called and spoke to patient, she stated she has not had imaging done yet because she has not yet received a call from [employer] with authorization for it yet.     Thanked the pt and informed her we will reach out to [employer] to inquire on the status of i

## 2021-09-22 NOTE — TELEPHONE ENCOUNTER
This response is to [facility]--the patient still has some pain you are seeing her for--  And real issues.     She is now concerned she has a physical dependence on the opioids as she is very anxious  Nauseous and irritable    Pacing and waiting for the n

## 2021-09-22 NOTE — TELEPHONE ENCOUNTER
This is the note addressing wean of medications:    Hi Dr. Mony Christopher,     I'd recommend contacting [facility], [facility] or Dr. Ml Dooley or Dr. ADELFO DANIELS with pain management.  More complex patients such as this or concern with weaning, I'd defer to their recommendat

## 2021-09-22 NOTE — TELEPHONE ENCOUNTER
Dr. Ping Tucker, I responded with resources to pain services. Please let me know if we can be of further assistance, thank you. I will have MARIEL nursing contact the patient to ensure she is working on completing imaging given weakness found on examination.

## 2021-09-25 ENCOUNTER — TELEPHONE (OUTPATIENT)
Dept: FAMILY MEDICINE CLINIC | Facility: CLINIC | Age: 76
End: 2021-09-25

## 2021-09-25 NOTE — TELEPHONE ENCOUNTER
Spoke with patient who states she has been trying to stretch her Hydrocodone to 1 every 5 hours per Dr. Go Canseco, but is not able to manage her pain effectively that way. Advised she go back to 4 hours as prescribed and use motrin in between as needed.  Oneyda Angeles

## 2021-09-27 RX ORDER — HYDROCODONE BITARTRATE AND ACETAMINOPHEN 7.5; 325 MG/1; MG/1
1 TABLET ORAL EVERY 4 HOURS PRN
Qty: 42 TABLET | Refills: 0 | Status: SHIPPED | OUTPATIENT
Start: 2021-09-27 | End: 2021-10-05

## 2021-09-27 RX ORDER — HYDROCODONE BITARTRATE AND ACETAMINOPHEN 7.5; 325 MG/1; MG/1
1 TABLET ORAL EVERY 4 HOURS PRN
Qty: 42 TABLET | Refills: 0 | Status: SHIPPED | OUTPATIENT
Start: 2021-09-27 | End: 2021-09-27

## 2021-09-27 NOTE — TELEPHONE ENCOUNTER
Scrip printed and [facility] QUERIED AND VERIFIED TODAY

## 2021-09-27 NOTE — TELEPHONE ENCOUNTER
[facility] QUERIED AND VERIFIED TODAY

## 2021-09-27 NOTE — TELEPHONE ENCOUNTER
Patient discussed her concerns with the medication and reminding this nurse she has an appointment with pain clinic on 10/5    LOV 08/03/2021    LAST LAB 08/14/2021    LAST RX  Hydrocodone #42 R0 09/18/2021    Next OV   Future Appointments   Date Time Prov

## 2021-09-27 NOTE — TELEPHONE ENCOUNTER
LOV 08/03/2021     LAST LAB 08/14/2021     LAST RX  Hydrocodone #42 R0 09/18/2021     Next OV          Future Appointments   Date Time Provider Vahe Hurt   10/5/2021  2:00 PM Cheo Buckner Michael, APRN [facility] [place]      PROTOCOL

## 2021-10-05 ENCOUNTER — OFFICE VISIT (OUTPATIENT)
Dept: PAIN CLINIC | Facility: CLINIC | Age: 76
End: 2021-10-05
Payer: COMMERCIAL

## 2021-10-05 VITALS
BODY MASS INDEX: 21 KG/M2 | WEIGHT: 125 LBS | DIASTOLIC BLOOD PRESSURE: 52 MMHG | OXYGEN SATURATION: 98 % | SYSTOLIC BLOOD PRESSURE: 122 MMHG | HEART RATE: 68 BPM

## 2021-10-05 DIAGNOSIS — F11.10 OPIOID ABUSE, CONTINUOUS USE (HCC): ICD-10-CM

## 2021-10-05 DIAGNOSIS — M80.00XA PATHOLOGICAL FRACTURE OF OTHER SITE DUE TO OSTEOPOROSIS, UNSPECIFIED OSTEOPOROSIS TYPE, INITIAL ENCOUNTER: Primary | ICD-10-CM

## 2021-10-05 PROCEDURE — 96127 BRIEF EMOTIONAL/BEHAV ASSMT: CPT | Performed by: NURSE PRACTITIONER

## 2021-10-05 PROCEDURE — 99214 OFFICE O/P EST MOD 30 MIN: CPT | Performed by: NURSE PRACTITIONER

## 2021-10-05 PROCEDURE — 3078F DIAST BP <80 MM HG: CPT | Performed by: NURSE PRACTITIONER

## 2021-10-05 PROCEDURE — 3074F SYST BP LT 130 MM HG: CPT | Performed by: NURSE PRACTITIONER

## 2021-10-05 RX ORDER — GABAPENTIN 300 MG/1
300 CAPSULE ORAL 3 TIMES DAILY
Qty: 90 CAPSULE | Refills: 0 | Status: SHIPPED | OUTPATIENT
Start: 2021-10-05 | End: 2021-11-02

## 2021-10-05 RX ORDER — HYDROCODONE BITARTRATE AND ACETAMINOPHEN 7.5; 325 MG/1; MG/1
1 TABLET ORAL EVERY 6 HOURS PRN
Qty: 42 TABLET | Refills: 0 | COMMUNITY
Start: 2021-10-05 | End: 2021-10-29

## 2021-10-05 RX ORDER — HYDROCODONE BITARTRATE AND ACETAMINOPHEN 7.5; 325 MG/1; MG/1
1 TABLET ORAL EVERY 4 HOURS PRN
Qty: 42 TABLET | Refills: 0 | Status: SHIPPED | OUTPATIENT
Start: 2021-10-05 | End: 2021-10-05

## 2021-10-05 NOTE — TELEPHONE ENCOUNTER
Scrip printed and [facility] QUERIED AND VERIFIED TODAY

## 2021-10-05 NOTE — TELEPHONE ENCOUNTER
Patient sees pain clinic today.      LOV 08/03/2021    LAST LAB 08/14/2021    LAST RX  Hydrocodone #42 R0 09/27/2021    Next OV   Future Appointments   Date Time Provider Vahe Hurt   10/5/2021  2:00 PM Keira Buckner APRN ENConsuelo [facility] [place]

## 2021-10-05 NOTE — TELEPHONE ENCOUNTER
HYDROcodone-acetaminophen 7.5-325 MG Oral Tab   CALL TO [facility]/SHE SAID SHE HAS HAD PROBLEMS IN THE PAST US SENDING TO THE WRONG PHARMACY.  SEND TO [facility]

## 2021-10-05 NOTE — PROGRESS NOTES
HPI:   Kody Lin presents with complaints of opioid withdrawal symptoms/primarily anxiety, disrupted sleep, altered mood, inability to maintain body temperature.   She states that she has h/o breast cancer/had bilat mastectomy/no h/o radiation/she states Tab Take 20 mg by mouth daily. • simvastatin 10 MG Oral Tab Take 1 tablet (10 mg total) by mouth nightly. 90 tablet 1   • predniSONE 5 MG Oral Tab Take 1 tablet (5 mg total) by mouth daily.  90 tablet 0   • Calcium Carbonate 1250 (500 Ca) MG Oral Chew T taking hydrocodone 7.5/325 5 tabs per day scheduled and is feeling anxious about weaning off the meds/is having poor sleep/body aches/anhodynia/patient had CSSRS completed today/no SI/SA/she was also seen by the pain psychologist briefly/no active intentio plan.      LE#251

## 2021-10-05 NOTE — PROGRESS NOTES
Patient presents in office today with reported pain in ***    Current pain level reported = ***/10    Last reported dose of ***      Narcotic Contract renewal na    Urine Drug screen na

## 2021-10-05 NOTE — PATIENT INSTRUCTIONS
Refill policies:    • Allow 2-3 business days for refills; controlled substances may take longer.   • Contact your pharmacy at least 5 days prior to running out of medication and have them send an electronic request or submit request through the “request re Depending on your insurance carrier, approval may take 3-10 days. It is highly recommended patients contact their insurance carrier directly to determine coverage.   If test is done without insurance authorization, patient may be responsible for the entire per day over next week  • >stop trazodone   • >start remeron 7.5mg at bedtime for sleep: notify if not effective for sleep: this can be increased to 15mg   • >increase gabapentin to 300mg a.m., mid day and bedtime for anxiety and withdrawal symptoms and pa

## 2021-10-05 NOTE — PROGRESS NOTES
Subjective:   Patient ID: Roddy Arteaga is a 76year old female.     HPI    History/Other:   Review of Systems  Current Outpatient Medications   Medication Sig Dispense Refill   • HYDROcodone-acetaminophen 7.5-325 MG Oral Tab Take 1 tablet by mouth every 4 (

## 2021-10-06 ENCOUNTER — TELEPHONE (OUTPATIENT)
Dept: PAIN CLINIC | Facility: CLINIC | Age: 76
End: 2021-10-06

## 2021-10-06 RX ORDER — MIRTAZAPINE 7.5 MG/1
7.5 TABLET, FILM COATED ORAL NIGHTLY
Qty: 30 TABLET | Refills: 0 | Status: SHIPPED | OUTPATIENT
Start: 2021-10-06 | End: 2021-11-02

## 2021-10-06 NOTE — TELEPHONE ENCOUNTER
Pt seen with Dayami Jimenez yesterday, 10/05, and during her visit, her medications were changed. Pt states that she didn't see Remeron on her AVS showing this medication change, but it is in the Plan for the patient in Cassandra's notes.     · \"start remeron 7

## 2021-10-06 NOTE — TELEPHONE ENCOUNTER
Pt states she was given a referral for the bone clinic with Dr. Billie Bernal by NP [facility]. Pt states she called and was told first available appt would be [place]. 3, 2022. Pt is asking if PK Buckner can call to get sooner appt.

## 2021-10-06 NOTE — TELEPHONE ENCOUNTER
Spoke with patient and advised provider is not back in office until Friday 10/8 and medication will be prescribed and sent to the pharmacy then. Patient verbalized understanding and was appreciative of call.      Cassandra GEE to prescribe Remeron 7.5 mg

## 2021-10-07 NOTE — TELEPHONE ENCOUNTER
Please let patient know I am sorry, I thought med went through. I ordered it. She can start tonight.

## 2021-10-07 NOTE — TELEPHONE ENCOUNTER
Attempted to reach patient on home number (per verbal release) twice to inform below. Received a busy tone. Will try again later.

## 2021-10-08 NOTE — TELEPHONE ENCOUNTER
Spoke with patient and advised Rx for Mirtazapine was sent to [facility] yesterday. She verbalized understanding and was appreciative of call.  Receipt confirmed by pharmacy (10/6/2021  9:12 PM CDT)

## 2021-10-08 NOTE — TELEPHONE ENCOUNTER
Attempted to reach patient to inform below but received a busy tone. When returns call please let her know Amanda Leggett is trying to get her in to see Dr Adry Barbour sooner than Jan 2022.

## 2021-10-08 NOTE — TELEPHONE ENCOUNTER
Adi Christie, GERARD  You 5 minutes ago (11:31 AM)         I also sent message to Dr. Dusty Jason re: getting her in sooner.    [place]    Message text

## 2021-10-19 NOTE — PROGRESS NOTES
HPI:   Hiwot Denise presents for follow-up on opioid taper at last visit patient was having active opioid withdrawal symptoms lack of sleep irritability worsening mood and has been able to reduce her [employer] 7.5/325 intake to 3 times daily dosing 1 every mor 96%   BMI 20.60 kg/m²   VAS Pain Score:  /10  General Appearance: Well developed, well nourished, normal build, independent body habitus, no apparent physical disabilities, well groomed/with TLSO brace in place    Neurological Exam: WNL-Orientation to time opioid withdrawal and the following plan was developed    Plan:   • >reduce hydrocodone7.5/325 to 1 tab a.m. half tab mid day and 1 tab at HS  • >if half tab mid day does not result in pain relief/may take additional half  • >follow up in 2 weeks to evalua

## 2021-10-19 NOTE — PROGRESS NOTES
Patient presents in office today with reported pain in low back    Current pain level reported = 2/10    Last reported dose of norco this AM      Narcotic Contract renewal na    Urine Drug screen na

## 2021-10-29 DIAGNOSIS — M80.00XA PATHOLOGICAL FRACTURE OF OTHER SITE DUE TO OSTEOPOROSIS, UNSPECIFIED OSTEOPOROSIS TYPE, INITIAL ENCOUNTER: ICD-10-CM

## 2021-10-29 DIAGNOSIS — F11.10 OPIOID ABUSE, CONTINUOUS USE (HCC): ICD-10-CM

## 2021-10-29 RX ORDER — HYDROCODONE BITARTRATE AND ACETAMINOPHEN 7.5; 325 MG/1; MG/1
1 TABLET ORAL EVERY 8 HOURS PRN
Qty: 42 TABLET | Refills: 0 | Status: SHIPPED | OUTPATIENT
Start: 2021-10-29 | End: 2021-11-19

## 2021-10-29 NOTE — TELEPHONE ENCOUNTER
Per pain management patient is weaning her [place] to TID.      LOV 08/03/2021    LAST LAB 02/15/2021    LAST RX  Hydrocodone #42 R0 10/5/21    Next OV   Future Appointments   Date Time Provider Vahe Hurt   11/2/2021  2:00 PM [facility] [place], [place]

## 2021-11-02 NOTE — PROGRESS NOTES
HPI:   Rajeev Gracia presents for follow-up on opioid taper at last visit patient was doing well without active opioid withdrawal symptoms/her sleep had improved with remeron and she was hopeful and feeling that she had more energy and her qol was improving mouth. Patient requires assistance with: No assistance required    Have you recently had any feelings of harming yourself or others? The patient's response was no.     Physical Exam:   /70   Pulse 82   Resp 16   Wt 120 lb (54.4 kg)   SpO2 95% brace is feeling well overall and wants to continue the weaning process. She will follow up in 2 weeks to evaluate her response and at that time we can decrease her to twice daily dosing.     She and her  were educated re: tylenol and nsaid use as we

## 2021-11-02 NOTE — PATIENT INSTRUCTIONS
>refilled gabapentin today for 300mg three times per day  >use mirtazapine(remeron) 7.5mg at night for sleep  >reduce morning dose of norco to half tablet/keep half tablet at lunch/full tablet at night time as needed for pain  >may use up to 2000 mg Extra

## 2021-11-13 ENCOUNTER — TELEPHONE (OUTPATIENT)
Dept: FAMILY MEDICINE CLINIC | Facility: CLINIC | Age: 76
End: 2021-11-13

## 2021-11-13 DIAGNOSIS — Z00.00 LABORATORY EXAMINATION ORDERED AS PART OF A ROUTINE GENERAL MEDICAL EXAMINATION: ICD-10-CM

## 2021-11-13 RX ORDER — PREDNISONE 1 MG/1
5 TABLET ORAL DAILY
Qty: 90 TABLET | Refills: 0 | Status: SHIPPED | OUTPATIENT
Start: 2021-11-13

## 2021-11-13 NOTE — TELEPHONE ENCOUNTER
LOV 08/03/2021    LAST LAB 08/14/2021    LAST RX  Prednisone #90 R0 08/09/2021    Next OV   Future Appointments   Date Time Provider Vahe Hurt   11/16/2021  2:00 PM GERARD [facility]     PROTOCOL

## 2021-11-16 NOTE — PROGRESS NOTES
Patient presents in office today with reported pain in compression fracture    Current pain level reported = 2/10    Last reported dose of norco      Narcotic Contract renewal na    Urine Drug screen na

## 2021-11-16 NOTE — PROGRESS NOTES
HPI:   Lidia Davies presents for follow-up on opioid taper at last visit patient was doing well without active opioid withdrawal symptoms/her sleep had improved with remeron and she was hopeful and feeling that she had more energy and her qol was improving every 8 (eight) hours as needed for Pain. 42 tablet 0   • TYMLOS 3120 MCG/1.56ML Subcutaneous Solution Pen-injector Inject 80 mcg into the skin daily. 4.68 mL 3   • simvastatin 10 MG Oral Tab Take 1 tablet (10 mg total) by mouth nightly.  90 tablet 1   • Ca initial encounter  Impression:     Patient is here for 2-week follow-up related to opioid withdrawal weaning process tolerated decreasing her Norco and is using 7.5/325 half tab in the morning half tab at lunch full tab at bedtime total per day.   Patient i encounter. Meds & Refills for this Visit:  Requested Prescriptions      No prescriptions requested or ordered in this encounter       Imaging & Consults:  None    The patient indicates understanding of these issues and agrees to the plan.       ME#[id number]

## 2021-11-18 DIAGNOSIS — M80.00XA PATHOLOGICAL FRACTURE OF OTHER SITE DUE TO OSTEOPOROSIS, UNSPECIFIED OSTEOPOROSIS TYPE, INITIAL ENCOUNTER: ICD-10-CM

## 2021-11-18 DIAGNOSIS — F11.10 OPIOID ABUSE, CONTINUOUS USE (HCC): ICD-10-CM

## 2021-11-18 NOTE — TELEPHONE ENCOUNTER
Last office visit: 8/3/21  Last refill: 10/29/21 qty: 42  Future Appointments   Date Time Provider Vahe Hurt   11/30/2021  2:00 PM GERARD Tijerina [facility]

## 2021-11-19 RX ORDER — HYDROCODONE BITARTRATE AND ACETAMINOPHEN 7.5; 325 MG/1; MG/1
1 TABLET ORAL EVERY 8 HOURS PRN
Qty: 42 TABLET | Refills: 0 | Status: SHIPPED | OUTPATIENT
Start: 2021-11-19 | End: 2021-11-30

## 2021-11-30 NOTE — PROGRESS NOTES
Patient presents in office today with reported pain in pathological fracture    Current pain level reported = 1/10    Last reported dose of norco 1 this morning, 1/2 at lunch      Narcotic Contract renewal none    Urine Drug screen none

## 2021-11-30 NOTE — PROGRESS NOTES
HPI:   Roddy Arteaga presents for follow-up on opioid taper.     Today she is reporting that she did attempt to reduce her hydrocodone 7.5/325 to take a half tab in the morning half tab midday and a full tab at night however she felt like she was having opio a chair independently. Current Medications:  Current Outpatient Medications   Medication Sig Dispense Refill   • HYDROcodone-acetaminophen 7.5-325 MG Oral Tab Take 1 tablet by mouth every 8 (eight) hours as needed for Pain.  42 tablet 0   • tamoxifen 312.0 09/17/2020    .0 08/18/2020         Patient Education: patient and  provided education re: conservative treatment of opioid withdrawal and supportive treatment options. Patient educated and verbalized understanding.   Medical Decision M medications    No orders of the defined types were placed in this encounter.       Meds & Refills for this Visit:  Requested Prescriptions     Signed Prescriptions Disp Refills   • HYDROcodone-acetaminophen 5-325 MG Oral Tab 90 tablet 0     Sig: Take 1 tabl

## 2021-11-30 NOTE — PATIENT INSTRUCTIONS
Refill policies:    • Allow 2-3 business days for refills; controlled substances may take longer.   • Contact your pharmacy at least 5 days prior to running out of medication and have them send an electronic request or submit request through the “request re Depending on your insurance carrier, approval may take 3-10 days. It is highly recommended patients contact their insurance carrier directly to determine coverage.   If test is done without insurance authorization, patient may be responsible for the entire (nerve, pain and anxiety reducing medicine)  • >will add gabapentin 100mg to take as needed up to twice per day for any anxiety or shakiness   • >follow up in 2 weeks.

## 2021-12-02 RX ORDER — GABAPENTIN 100 MG/1
CAPSULE ORAL
Qty: 60 CAPSULE | Refills: 0 | Status: SHIPPED | OUTPATIENT
Start: 2021-12-02 | End: 2021-12-06

## 2021-12-02 NOTE — TELEPHONE ENCOUNTER
Last office visit: 8/3/21  Last refill: 11/3021  Future Appointments   Date Time Provider Vahe Hurt   12/14/2021  2:00 PM GER[facility]

## 2021-12-06 ENCOUNTER — TELEPHONE (OUTPATIENT)
Dept: FAMILY MEDICINE CLINIC | Facility: CLINIC | Age: 76
End: 2021-12-06

## 2021-12-06 RX ORDER — GABAPENTIN 300 MG/1
CAPSULE ORAL
Qty: 90 CAPSULE | Refills: 0 | Status: SHIPPED | OUTPATIENT
Start: 2021-12-06 | End: 2022-01-03

## 2021-12-06 NOTE — TELEPHONE ENCOUNTER
Medication: gabapentin 300 MG Oral Cap    Date of last refill: 11/02/21      Last office visit: 11/30/21  Due back to clinic per last office note:  2 weeks   Date next office visit scheduled:  12/14/21        Last OV note recommendation:       Mary Braga

## 2021-12-06 NOTE — TELEPHONE ENCOUNTER
Patient had questions regarding her gabapentin. She thought she would have to wait until Dr. Melita Godoy came back to refill another prescription. Explained that Wali Solomon [id number] filled for patient and is waiting at pharmacy for her.

## 2021-12-06 NOTE — TELEPHONE ENCOUNTER
Gama Mathew would like to speak with someone about a medication that she has been taking please call

## 2021-12-14 NOTE — PROGRESS NOTES
Patient presents in office today with reported pain in thoracic and lumbar spine    Current pain level reported = 2/10    Last reported dose of Norco, tylenol and advil today      Narcotic Contract renewal na    Urine Drug screen na

## 2021-12-14 NOTE — PROGRESS NOTES
HPI:   Be De La Rosa presents for follow-up on opioid taper. Patient presents today for evaluation of her opioid. Patient was changed to Norco 5/325 3 times daily at the last visit and she tolerated this decrease from the 7.5/325 tablets well.   Patient co shopping but eventually without severe pain  Patient reports that she has been able to increase her activity at home and her desire to interact and complete ADLs as her mood is also improved   The patient’s activity level has increased since last visit. motor/sensory deficits  Thoracic skin clean dry and intact  Mild tenderness to the right thoracic border mid back  Radiology/Lab Test Reviewed: no new images to review  Dexa scan in system + osteoporosis  Lab Results   Component Value Date    WBC 14.5 (H) building medications. She was also reminded to use Tylenol/ibuprofen as needed for thoracic pain. She was also instructed that if she has any thoracic pain that is not relieved with her medications she should notify us so that we can update her imaging.

## 2021-12-28 NOTE — PROGRESS NOTES
HPI:   Tricia Hollins presents for follow-up on opioid taper requested to change from an office visit to a phone visit today  Patient was last seen 12/14/2021 with the following plan developed please see below    Plan from 12/14/2021 office visit  > Continue her overall status. Patient denies any opioid withdrawal symptoms    She is using the melatonin for sleep has not needed to resort back to the Remeron. She is also using gabapentin 300 mg 3 times a day for pain and opioid withdrawal support.   She was g harming yourself or others? The patient's response was no. Physical Exam:   There were no vitals taken for this visit. Phone visit therefore no vitals taken  VAS Pain Score: 3 /10  No physical exam done as this was a phone visit.   Radiology/Lab Test Re total of 2.5 tablets of the Norco 5/325. She is not having any anxiety, runny nose watery eyes abdominal cramping diarrhea goosebumps or any other opioid withdrawal symptoms.   She continues to use her gabapentin 300 mg 3 times daily, and was also given 10 HYDROcodone-acetaminophen 5-325 MG Oral Tab 90 tablet 0     Sig: Take 1 tablet by mouth every 8 (eight) hours as needed for Pain. Imaging & Consults:  None    The patient indicates understanding of these issues and agrees to the plan.       NF#[id number]

## 2022-01-03 RX ORDER — GABAPENTIN 100 MG/1
100 CAPSULE ORAL 2 TIMES DAILY PRN
Qty: 60 CAPSULE | Refills: 0 | OUTPATIENT
Start: 2022-01-03

## 2022-01-03 RX ORDER — GABAPENTIN 300 MG/1
300 CAPSULE ORAL 3 TIMES DAILY
Qty: 90 CAPSULE | Refills: 0 | Status: SHIPPED | OUTPATIENT
Start: 2022-01-03 | End: 2022-02-01

## 2022-01-03 NOTE — TELEPHONE ENCOUNTER
Patient calling to request refill of Mercedes    Patient informed of 48 hour refill policy excluding weekends and holidays. Informed patient prescription is sent directly to pharmacy.     Further explained patient will not re

## 2022-01-03 NOTE — TELEPHONE ENCOUNTER
Medication: GABAPENTIN 300 MG Oral Cap    Date of last refill: 12/6/21    Last office visit: 12/14/21  Due back to clinic per last office note:  2 weeks  Date next office visit scheduled:  1/11/21      Last OV note recommendation:   Plan:   > Reduce hydroc

## 2022-01-11 NOTE — PROGRESS NOTES
HPI:   Jose Wen presents for follow-up on opioid taper requested to change from an office visit to a phone visit today  Patient was last seen 12/28/2021 with the following plan developed please see below    Plan from 12/28/21 visit   > Reduce hydrocodo increased pain but she is managing it with rest/meds.   She is using otc acetaminophen and nsaids as well as prn gabapentin to mitigate increases in her pain.      meds reviewed and no changes in her med list.        Patient continues to have is her TLSO br Take 1 tablet (5 mg total) by mouth daily. 90 tablet 0   • TYMLOS 3120 MCG/1.56ML Subcutaneous Solution Pen-injector Inject 80 mcg into the skin daily. 4.68 mL 3   • simvastatin 10 MG Oral Tab Take 1 tablet (10 mg total) by mouth nightly.  90 tablet 1   • C gabapentin at that time to help her manage her pain without resuming an increase in the opioids. She agrees and the following plan was developed.      She continues with improved appetite, sleeping better, completing activities of daily living and overall

## 2022-01-25 ENCOUNTER — VIRTUAL PHONE E/M (OUTPATIENT)
Dept: PAIN CLINIC | Facility: CLINIC | Age: 77
End: 2022-01-25
Payer: COMMERCIAL

## 2022-01-25 DIAGNOSIS — M80.00XA PATHOLOGICAL FRACTURE OF OTHER SITE DUE TO OSTEOPOROSIS, UNSPECIFIED OSTEOPOROSIS TYPE, INITIAL ENCOUNTER: ICD-10-CM

## 2022-01-25 DIAGNOSIS — F11.90 CHRONIC, CONTINUOUS USE OF OPIOIDS: ICD-10-CM

## 2022-01-25 PROCEDURE — G2252 BRIEF CHKIN BY MD/QHP, 11-20: HCPCS | Performed by: NURSE PRACTITIONER

## 2022-01-25 RX ORDER — HYDROCODONE BITARTRATE AND ACETAMINOPHEN 5; 325 MG/1; MG/1
0.5 TABLET ORAL EVERY 6 HOURS PRN
Qty: 90 TABLET | Refills: 0 | COMMUNITY
Start: 2022-01-25 | End: 2022-02-07

## 2022-01-25 NOTE — PROGRESS NOTES
HPI:   Lona Qiu presents for follow-up on opioid taper requested to change from an office visit to a phone visit today  Patient was last seen 1/11/2021 with the following plan developed please see below    Patient has done well weaning off opioids stat this     The patient’s activity level has increased since last visit.       Functional Assessment: Patient reports that they are able to complete all of their ADL's such as eating, bathing, using the toilet, dressing and getting up from a bed or a chair ind conservative treatment of opioid withdrawal and supportive treatment options. Patient educated and verbalized understanding.   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 And she is wearing her TLSO brace more with Dr. Kiah Montgomery bone building medications.    She was also instructed that if she has any thoraci

## 2022-02-01 RX ORDER — GABAPENTIN 300 MG/1
300 CAPSULE ORAL 3 TIMES DAILY
Qty: 90 CAPSULE | Refills: 0 | Status: SHIPPED | OUTPATIENT
Start: 2022-02-01 | End: 2022-02-07

## 2022-02-01 NOTE — TELEPHONE ENCOUNTER
Patient calling to request refill of   gabapentin 300 MG Oral [address] ([address]), [phone], [phone]  Patient informed of 48 hour refill policy excluding weekends and holidays. Informed patient prescription is sent directly to pharmacy. Further explained patient will not receive a call back once prescription is ready.

## 2022-02-01 NOTE — TELEPHONE ENCOUNTER
Medication: gabapentin 300 mg tid #90    Date of last refill: 12-28-21  [place] [id number]    Last office visit: 1-25-22  Due back to clinic per last office note:  2 weeks  Date next office visit scheduled:  2-14-22      Last OV note recommendation:   Plan:   > Reduce hydrocodone 5/325 to take half  tab in the morning,  Lunch,  dinner and bedtime equals two full tabs per day  > Follow-up in 2weeks: will consider eliminating one of the midday doses  > Continue gabapentin 300 mg 3 times daily  > Use gabapentin 100 mg twice daily as needed for anxiety opioid withdrawal symptoms as well as pain  > Continue using Tylenol/NSAIDs as needed for mild pain   >okay to take tylenol/nsaid and gabapentin together between lunch and dinner dose to reduce pain flare if needed  > Follow-up in 2 weeks for reevaluation and ongoing taper of opioid medications

## 2022-02-07 RX ORDER — HYDROCODONE BITARTRATE AND ACETAMINOPHEN 5; 325 MG/1; MG/1
0.5 TABLET ORAL EVERY 6 HOURS PRN
Qty: 60 TABLET | Refills: 0 | Status: SHIPPED | OUTPATIENT
Start: 2022-02-07 | End: 2022-03-01

## 2022-02-07 RX ORDER — GABAPENTIN 300 MG/1
300 CAPSULE ORAL 3 TIMES DAILY
Qty: 90 CAPSULE | Refills: 0 | Status: SHIPPED | OUTPATIENT
Start: 2022-02-07 | End: 2022-03-04

## 2022-02-07 NOTE — TELEPHONE ENCOUNTER
Patient calling to request refill of HYDROcodone-acetaminophen 5-325 MG Oral Tab  gabapentin 100 MG Oral [id number] - [address] ([address]), [phone], [phone]    Patient informed of 48 hour refill policy excluding weekends and holidays. Informed patient prescription is sent directly to pharmacy. Further explained patient will not receive a call back once prescription is ready.

## 2022-02-11 RX ORDER — PREDNISONE 1 MG/1
5 TABLET ORAL DAILY
Qty: 90 TABLET | Refills: 0 | Status: SHIPPED | OUTPATIENT
Start: 2022-02-11

## 2022-02-11 NOTE — TELEPHONE ENCOUNTER
LOV 08/03/2021    LAST LAB 08/14/2021    LAST RX  Prednisone #90 R0 11/13/2021    Next OV   Future Appointments   Date Time Provider Vahe Hurt   2/14/2022  2:00 PM GERARD Sterling [facility]     PROTOCOL

## 2022-03-01 ENCOUNTER — VIRTUAL PHONE E/M (OUTPATIENT)
Dept: PAIN CLINIC | Facility: CLINIC | Age: 77
End: 2022-03-01
Payer: COMMERCIAL

## 2022-03-01 DIAGNOSIS — F11.90 CHRONIC, CONTINUOUS USE OF OPIOIDS: ICD-10-CM

## 2022-03-01 DIAGNOSIS — M80.00XA PATHOLOGICAL FRACTURE OF OTHER SITE DUE TO OSTEOPOROSIS, UNSPECIFIED OSTEOPOROSIS TYPE, INITIAL ENCOUNTER: ICD-10-CM

## 2022-03-01 PROCEDURE — G2252 BRIEF CHKIN BY MD/QHP, 11-20: HCPCS | Performed by: NURSE PRACTITIONER

## 2022-03-01 RX ORDER — HYDROCODONE BITARTRATE AND ACETAMINOPHEN 5; 325 MG/1; MG/1
TABLET ORAL
Qty: 45 TABLET | Refills: 0 | COMMUNITY
Start: 2022-03-01 | End: 2022-03-15

## 2022-03-04 RX ORDER — GABAPENTIN 300 MG/1
300 CAPSULE ORAL 3 TIMES DAILY
Qty: 90 CAPSULE | Refills: 0 | Status: SHIPPED | OUTPATIENT
Start: 2022-03-04

## 2022-03-04 NOTE — TELEPHONE ENCOUNTER
Patient calling to request refill of   gabapentin 300 MG Oral [address] ([address]), [phone], [phone]    Patient informed of 48 hour refill policy excluding weekends and holidays. Informed patient prescription is sent directly to pharmacy. Further explained patient will not receive a call back once prescription is ready.

## 2022-03-11 RX ORDER — GABAPENTIN 100 MG/1
100 CAPSULE ORAL 2 TIMES DAILY PRN
Qty: 60 CAPSULE | Refills: 0 | Status: SHIPPED | OUTPATIENT
Start: 2022-03-11

## 2022-03-11 NOTE — TELEPHONE ENCOUNTER
Patient calling to request refill of   gabapentin 100 MG Oral [address], [address] [place] [address] ([address]), [phone], [phone]    Patient informed of 48 hour refill policy excluding weekends and holidays. Informed patient prescription is sent directly to pharmacy. Further explained patient will not receive a call back once prescription is ready.

## 2022-03-15 ENCOUNTER — VIRTUAL PHONE E/M (OUTPATIENT)
Dept: PAIN CLINIC | Facility: CLINIC | Age: 77
End: 2022-03-15
Payer: COMMERCIAL

## 2022-03-15 ENCOUNTER — TELEPHONE (OUTPATIENT)
Dept: FAMILY MEDICINE CLINIC | Facility: CLINIC | Age: 77
End: 2022-03-15

## 2022-03-15 DIAGNOSIS — F11.90 CHRONIC, CONTINUOUS USE OF OPIOIDS: ICD-10-CM

## 2022-03-15 DIAGNOSIS — M80.00XA PATHOLOGICAL FRACTURE OF OTHER SITE DUE TO OSTEOPOROSIS, UNSPECIFIED OSTEOPOROSIS TYPE, INITIAL ENCOUNTER: Primary | ICD-10-CM

## 2022-03-15 PROCEDURE — G2252 BRIEF CHKIN BY MD/QHP, 11-20: HCPCS | Performed by: NURSE PRACTITIONER

## 2022-03-15 RX ORDER — ACETAMINOPHEN 325 MG/1
325 TABLET ORAL EVERY 6 HOURS PRN
COMMUNITY

## 2022-03-15 RX ORDER — SIMVASTATIN 10 MG
10 TABLET ORAL NIGHTLY
Qty: 90 TABLET | Refills: 0 | Status: SHIPPED | OUTPATIENT
Start: 2022-03-15 | End: 2022-06-13

## 2022-03-15 RX ORDER — MELATONIN
3 NIGHTLY
COMMUNITY

## 2022-03-15 RX ORDER — IBUPROFEN 200 MG
200 TABLET ORAL EVERY 6 HOURS PRN
COMMUNITY

## 2022-03-15 RX ORDER — HYDROCODONE BITARTRATE AND ACETAMINOPHEN 5; 325 MG/1; MG/1
TABLET ORAL
Qty: 30 TABLET | Refills: 0 | Status: SHIPPED | OUTPATIENT
Start: 2022-03-15

## 2022-03-15 NOTE — TELEPHONE ENCOUNTER
LOV 08/03/2021    LAST LAB 02/10/2022    LAST RX  Simvastatin #90 R1 08/09/2021    Next OV   Future Appointments   Date Time Provider Vahe Hurt   3/15/2022  1:30 PM GERARD Acevedo [facility]     PROTOCOL

## 2022-04-04 NOTE — TELEPHONE ENCOUNTER
Patient calling to request refill of   gabapentin 300 MG Oral [address] ([address]), [phone], [phone]    Patient informed of 48 hour refill policy excluding weekends and holidays. Informed patient prescription is sent directly to pharmacy. Further explained patient will not receive a call back once prescription is ready.

## 2022-04-05 RX ORDER — GABAPENTIN 300 MG/1
300 CAPSULE ORAL 3 TIMES DAILY
Qty: 90 CAPSULE | Refills: 0 | Status: SHIPPED | OUTPATIENT
Start: 2022-04-05

## 2022-04-12 ENCOUNTER — VIRTUAL PHONE E/M (OUTPATIENT)
Dept: PAIN CLINIC | Facility: CLINIC | Age: 77
End: 2022-04-12
Payer: COMMERCIAL

## 2022-04-12 DIAGNOSIS — F11.90 CHRONIC, CONTINUOUS USE OF OPIOIDS: ICD-10-CM

## 2022-04-12 DIAGNOSIS — M80.00XA PATHOLOGICAL FRACTURE OF OTHER SITE DUE TO OSTEOPOROSIS, UNSPECIFIED OSTEOPOROSIS TYPE, INITIAL ENCOUNTER: Primary | ICD-10-CM

## 2022-04-12 RX ORDER — HYDROCODONE BITARTRATE AND ACETAMINOPHEN 5; 325 MG/1; MG/1
TABLET ORAL
Qty: 30 TABLET | Refills: 0 | Status: SHIPPED | OUTPATIENT
Start: 2022-04-12

## 2022-04-12 RX ORDER — GABAPENTIN 100 MG/1
100 CAPSULE ORAL 2 TIMES DAILY PRN
Qty: 60 CAPSULE | Refills: 0 | Status: SHIPPED | OUTPATIENT
Start: 2022-04-12

## 2022-04-26 ENCOUNTER — VIRTUAL PHONE E/M (OUTPATIENT)
Dept: PAIN CLINIC | Facility: CLINIC | Age: 77
End: 2022-04-26
Payer: COMMERCIAL

## 2022-04-26 DIAGNOSIS — F11.90 CHRONIC, CONTINUOUS USE OF OPIOIDS: ICD-10-CM

## 2022-04-26 DIAGNOSIS — M80.00XA PATHOLOGICAL FRACTURE OF OTHER SITE DUE TO OSTEOPOROSIS, UNSPECIFIED OSTEOPOROSIS TYPE, INITIAL ENCOUNTER: Primary | ICD-10-CM

## 2022-04-26 PROCEDURE — G2252 BRIEF CHKIN BY MD/QHP, 11-20: HCPCS | Performed by: NURSE PRACTITIONER

## 2022-05-05 RX ORDER — HYDROCODONE BITARTRATE AND ACETAMINOPHEN 5; 325 MG/1; MG/1
TABLET ORAL
Qty: 15 TABLET | Refills: 0 | Status: SHIPPED | OUTPATIENT
Start: 2022-05-05

## 2022-05-05 RX ORDER — GABAPENTIN 100 MG/1
100 CAPSULE ORAL 2 TIMES DAILY PRN
Qty: 60 CAPSULE | Refills: 0 | Status: SHIPPED | OUTPATIENT
Start: 2022-05-05

## 2022-05-05 NOTE — TELEPHONE ENCOUNTER
Medication: gabapentin 100 MG Oral Cap    Date of last refill: 4/12/22      Medication: HYDROcodone-acetaminophen 5-325 MG Oral Tab    Date of last refill: 4/16/22  [place] 4/[place]    Last office visit: 4/26/22 phone visit  Due back to clinic per last office note:  2 weeks via phone  Date next office visit scheduled:  5/10/22    Last URINE Screen: none  Screen Results:  none      Narcotic Contract EXPIRATION date: none    Last OV note recommendation:   Plan:   > Begin trying to eliminate morning dose of Norco 5/325: Half tab by changing to every other day for few days, then every 3 days for few days and so on until he is completely eliminated  > Take the acetaminophen/NSAID product at bedtime to see if this will reduce morning pain  > Call for refill  > We will follow-up in 2 weeks via the phone at that time will consider eliminating the dinner dose  > Continue gabapentin 300 mg 3 times daily  > Use gabapentin 100 mg twice daily as needed for anxiety opioid withdrawal symptoms as well as pain: refilled today  >okay to take tylenol/nsaid and gabapentin together  to reduce pain flare if needed  > Once patient is off the opioids if she continues reporting mood dysregulation may consider low-dose Cymbalta  > Follow-up in 2 weeks for re-evaluation and ongoing taper of opioid medications

## 2022-05-05 NOTE — TELEPHONE ENCOUNTER
Patient calling to request refill of HYDROcodone-acetaminophen 5-325 MG Oral Tab  gabapentin 300 MG Oral [address] #[id number] - [address] ([address]), [phone], [phone]    Patient informed of 48 hour refill policy excluding weekends and holidays. Informed patient prescription is sent directly to pharmacy. Further explained patient will not receive a call back once prescription is ready.

## 2022-05-06 NOTE — TELEPHONE ENCOUNTER
LOV 08/03/2021    LAST LAB 02/10/2022    LAST RX  Prednisone #90 R0 02/11/2022    Next OV   Future Appointments   Date Time Provider Vahe Hurt   5/10/2022  2:00 PM [facility]ARD Sterling [facility]     PROTOCOL

## 2022-05-07 RX ORDER — PREDNISONE 1 MG/1
TABLET ORAL
Qty: 90 TABLET | Refills: 3 | Status: SHIPPED | OUTPATIENT
Start: 2022-05-07

## 2022-05-10 ENCOUNTER — VIRTUAL PHONE E/M (OUTPATIENT)
Dept: PAIN CLINIC | Facility: CLINIC | Age: 77
End: 2022-05-10
Payer: COMMERCIAL

## 2022-05-10 DIAGNOSIS — F11.90 CHRONIC, CONTINUOUS USE OF OPIOIDS: ICD-10-CM

## 2022-05-10 DIAGNOSIS — M80.00XA PATHOLOGICAL FRACTURE OF OTHER SITE DUE TO OSTEOPOROSIS, UNSPECIFIED OSTEOPOROSIS TYPE, INITIAL ENCOUNTER: Primary | ICD-10-CM

## 2022-05-10 PROCEDURE — G2252 BRIEF CHKIN BY MD/QHP, 11-20: HCPCS | Performed by: NURSE PRACTITIONER

## 2022-05-10 RX ORDER — GABAPENTIN 300 MG/1
300 CAPSULE ORAL 3 TIMES DAILY
Qty: 90 CAPSULE | Refills: 0 | Status: SHIPPED | OUTPATIENT
Start: 2022-05-10

## 2022-05-10 RX ORDER — GABAPENTIN 100 MG/1
100 CAPSULE ORAL 2 TIMES DAILY PRN
Qty: 60 CAPSULE | Refills: 0 | Status: SHIPPED | OUTPATIENT
Start: 2022-05-10

## 2022-06-07 ENCOUNTER — VIRTUAL PHONE E/M (OUTPATIENT)
Dept: PAIN CLINIC | Facility: CLINIC | Age: 77
End: 2022-06-07
Payer: COMMERCIAL

## 2022-06-07 DIAGNOSIS — M80.00XA PATHOLOGICAL FRACTURE OF OTHER SITE DUE TO OSTEOPOROSIS, UNSPECIFIED OSTEOPOROSIS TYPE, INITIAL ENCOUNTER: ICD-10-CM

## 2022-06-07 PROCEDURE — 99213 OFFICE O/P EST LOW 20 MIN: CPT | Performed by: NURSE PRACTITIONER

## 2022-06-07 RX ORDER — GABAPENTIN 300 MG/1
300 CAPSULE ORAL 3 TIMES DAILY
Qty: 90 CAPSULE | Refills: 0 | Status: SHIPPED | OUTPATIENT
Start: 2022-06-07

## 2022-06-07 RX ORDER — SIMVASTATIN 10 MG
TABLET ORAL
Qty: 90 TABLET | Refills: 3 | Status: SHIPPED | OUTPATIENT
Start: 2022-06-07

## 2022-06-07 NOTE — TELEPHONE ENCOUNTER
LOV 08/03/2021    LAST LAB 02/10/2022    LAST RX  Simvastatin #90 R0 03/15/2022    Next OV No future appointments.       PROTOCOL

## 2022-06-20 DIAGNOSIS — M80.00XA PATHOLOGICAL FRACTURE OF OTHER SITE DUE TO OSTEOPOROSIS, UNSPECIFIED OSTEOPOROSIS TYPE, INITIAL ENCOUNTER: ICD-10-CM

## 2022-06-20 RX ORDER — GABAPENTIN 300 MG/1
300 CAPSULE ORAL 3 TIMES DAILY
Qty: 90 CAPSULE | Refills: 0 | Status: SHIPPED | OUTPATIENT
Start: 2022-06-20

## 2022-06-20 NOTE — TELEPHONE ENCOUNTER
Patient calling to request refill of   gabapentin 100 MG Oral [address], [address] Cooper [address] [place] [address] ([address]), [phone], [phone]    Patient informed of 48 hour refill policy excluding weekends and holidays. Informed patient prescription is sent directly to pharmacy. Further explained patient will not receive a call back once prescription is ready.

## 2022-06-20 NOTE — TELEPHONE ENCOUNTER
Medication: gabapentin 300 MG Oral Cap    Date of last refill: 06/07/22      Last office visit: 06/07/22 virtual  Due back to clinic per last office note:  2 weeks virtual  Date next office visit scheduled:  06/2122      Last OV note recommendation: management, using the pain scale to rate pain at home using over-the-counter remedies as well as gabapentin and the following plan was developed   Plan:   > Continue using over-the-counter medications as needed for pain  > Refilled gabapentin 300 mg 3 times daily today  > We will follow-up in 2 weeks via the phone   > Use gabapentin 100 mg twice daily as needed for anxiety opioid withdrawal symptoms as well as pain: At next visit will begin reducing this dose of gabapentin  >okay to take tylenol/nsaid and gabapentin together  to reduce pain flare if needed

## 2022-06-21 ENCOUNTER — VIRTUAL PHONE E/M (OUTPATIENT)
Dept: PAIN CLINIC | Facility: CLINIC | Age: 77
End: 2022-06-21
Payer: COMMERCIAL

## 2022-06-21 DIAGNOSIS — F11.90 CHRONIC, CONTINUOUS USE OF OPIOIDS: ICD-10-CM

## 2022-06-21 DIAGNOSIS — M80.00XA PATHOLOGICAL FRACTURE OF OTHER SITE DUE TO OSTEOPOROSIS, UNSPECIFIED OSTEOPOROSIS TYPE, INITIAL ENCOUNTER: ICD-10-CM

## 2022-06-21 PROCEDURE — G2252 BRIEF CHKIN BY MD/QHP, 11-20: HCPCS | Performed by: NURSE PRACTITIONER

## 2022-06-21 RX ORDER — GABAPENTIN 100 MG/1
100 CAPSULE ORAL DAILY
Qty: 60 CAPSULE | Refills: 0 | COMMUNITY
Start: 2022-06-21

## 2022-07-05 ENCOUNTER — VIRTUAL PHONE E/M (OUTPATIENT)
Dept: PAIN CLINIC | Facility: CLINIC | Age: 77
End: 2022-07-05
Payer: COMMERCIAL

## 2022-07-05 DIAGNOSIS — F11.90 CHRONIC, CONTINUOUS USE OF OPIOIDS: ICD-10-CM

## 2022-07-05 DIAGNOSIS — M80.00XA PATHOLOGICAL FRACTURE OF OTHER SITE DUE TO OSTEOPOROSIS, UNSPECIFIED OSTEOPOROSIS TYPE, INITIAL ENCOUNTER: Primary | ICD-10-CM

## 2022-07-08 DIAGNOSIS — M80.00XA PATHOLOGICAL FRACTURE OF OTHER SITE DUE TO OSTEOPOROSIS, UNSPECIFIED OSTEOPOROSIS TYPE, INITIAL ENCOUNTER: ICD-10-CM

## 2022-07-08 RX ORDER — GABAPENTIN 300 MG/1
300 CAPSULE ORAL 3 TIMES DAILY
Qty: 90 CAPSULE | Refills: 0 | Status: SHIPPED | OUTPATIENT
Start: 2022-07-08

## 2022-07-08 NOTE — TELEPHONE ENCOUNTER
Medication: gabapentin 300 MG Oral Cap    Date of last refill: 6/20/22    Last office visit: 7/5/22  Due back to clinic per last office note:  2 weeks phone visit  Date next office visit scheduled:  7/19/22      Last OV note recommendation:  Plan:   > Continue using over-the-counter medications as needed for pain  > Continue gabapentin 300 mg 3 times daily   > We will follow-up in 2 weeks via the phone   > Eliminate morning gabapentin 100 mg dose  >at next visit would eliminate mid day 300mg gabapentin and change to 100mg with eventual goal to be on 100mg gabapentin tid and further taper off this as well.  >okay to take tylenol/nsaid and gabapentin together  to reduce pain flare if needed

## 2022-07-08 NOTE — TELEPHONE ENCOUNTER
Patient calling to request refill of   gabapentin 300 MG Oral [address] ([address]), [phone], [phone]    Patient informed of 48 hour refill policy excluding weekends and holidays. Informed patient prescription is sent directly to pharmacy. Further explained patient will not receive a call back once prescription is ready.

## 2022-07-19 ENCOUNTER — VIRTUAL PHONE E/M (OUTPATIENT)
Dept: PAIN CLINIC | Facility: CLINIC | Age: 77
End: 2022-07-19
Payer: COMMERCIAL

## 2022-07-19 DIAGNOSIS — M80.00XA PATHOLOGICAL FRACTURE OF OTHER SITE DUE TO OSTEOPOROSIS, UNSPECIFIED OSTEOPOROSIS TYPE, INITIAL ENCOUNTER: Primary | ICD-10-CM

## 2022-07-19 PROCEDURE — 99443 PHONE E/M BY PHYS 21-30 MIN: CPT | Performed by: NURSE PRACTITIONER

## 2022-08-02 ENCOUNTER — VIRTUAL PHONE E/M (OUTPATIENT)
Dept: PAIN CLINIC | Facility: CLINIC | Age: 77
End: 2022-08-02
Payer: COMMERCIAL

## 2022-08-02 DIAGNOSIS — M80.00XA PATHOLOGICAL FRACTURE OF OTHER SITE DUE TO OSTEOPOROSIS, UNSPECIFIED OSTEOPOROSIS TYPE, INITIAL ENCOUNTER: Primary | ICD-10-CM

## 2022-08-02 PROCEDURE — G2252 BRIEF CHKIN BY MD/QHP, 11-20: HCPCS | Performed by: NURSE PRACTITIONER

## 2022-08-02 PROCEDURE — 1125F AMNT PAIN NOTED PAIN PRSNT: CPT | Performed by: NURSE PRACTITIONER

## 2022-08-15 ENCOUNTER — VIRTUAL PHONE E/M (OUTPATIENT)
Dept: PAIN CLINIC | Facility: CLINIC | Age: 77
End: 2022-08-15
Payer: COMMERCIAL

## 2022-08-15 DIAGNOSIS — F11.90 CHRONIC, CONTINUOUS USE OF OPIOIDS: ICD-10-CM

## 2022-08-15 DIAGNOSIS — M80.00XA PATHOLOGICAL FRACTURE OF OTHER SITE DUE TO OSTEOPOROSIS, UNSPECIFIED OSTEOPOROSIS TYPE, INITIAL ENCOUNTER: Primary | ICD-10-CM

## 2022-08-15 PROCEDURE — G2252 BRIEF CHKIN BY MD/QHP, 11-20: HCPCS | Performed by: NURSE PRACTITIONER

## 2022-08-15 RX ORDER — GABAPENTIN 100 MG/1
100 CAPSULE ORAL 3 TIMES DAILY
Qty: 120 CAPSULE | Refills: 0 | Status: SHIPPED | OUTPATIENT
Start: 2022-08-15

## 2022-09-12 ENCOUNTER — HOSPITAL ENCOUNTER (OUTPATIENT)
Dept: GENERAL RADIOLOGY | Age: 77
Discharge: HOME | End: 2022-09-12
Attending: NURSE PRACTITIONER
Payer: MEDICARE

## 2022-09-12 ENCOUNTER — VIRTUAL PHONE E/M (OUTPATIENT)
Dept: PAIN CLINIC | Facility: CLINIC | Age: 77
End: 2022-09-12
Payer: MEDICARE

## 2022-09-12 ENCOUNTER — TELEPHONE (OUTPATIENT)
Dept: PAIN CLINIC | Facility: CLINIC | Age: 77
End: 2022-09-12

## 2022-09-12 DIAGNOSIS — S32.020A COMPRESSION FRACTURE OF L2 VERTEBRA, INITIAL ENCOUNTER (HCC): ICD-10-CM

## 2022-09-12 DIAGNOSIS — M54.50 LUMBAR BACK PAIN: ICD-10-CM

## 2022-09-12 DIAGNOSIS — M54.6 MIDLINE THORACIC BACK PAIN, UNSPECIFIED CHRONICITY: ICD-10-CM

## 2022-09-12 DIAGNOSIS — M54.6 MIDLINE THORACIC BACK PAIN, UNSPECIFIED CHRONICITY: Primary | ICD-10-CM

## 2022-09-12 DIAGNOSIS — S32.010A COMPRESSION FRACTURE OF L1 VERTEBRA, INITIAL ENCOUNTER (HCC): Primary | ICD-10-CM

## 2022-09-12 PROCEDURE — 72114 X-RAY EXAM L-S SPINE BENDING: CPT | Performed by: NURSE PRACTITIONER

## 2022-09-12 PROCEDURE — 72072 X-RAY EXAM THORAC SPINE 3VWS: CPT | Performed by: NURSE PRACTITIONER

## 2022-09-12 RX ORDER — DIAZEPAM 2 MG/1
TABLET ORAL
Qty: 2 TABLET | Refills: 0 | Status: SHIPPED | OUTPATIENT
Start: 2022-09-12

## 2022-09-12 RX ORDER — GABAPENTIN 100 MG/1
100 CAPSULE ORAL 3 TIMES DAILY
Qty: 90 CAPSULE | Refills: 0 | Status: SHIPPED | OUTPATIENT
Start: 2022-09-12

## 2022-09-12 NOTE — TELEPHONE ENCOUNTER
New L1 and L2 compression fracture noted on low back xray not seen on last lumbar MRI    Patient states that she has not had any significant onset of lbp     Patient did request sedation for the procedure.   She was given 2mg valium #2 to use/see prescription/she will have a

## 2022-09-13 ENCOUNTER — TELEPHONE (OUTPATIENT)
Dept: PAIN CLINIC | Facility: CLINIC | Age: 77
End: 2022-09-13

## 2022-09-13 RX ORDER — GABAPENTIN 100 MG/1
100 CAPSULE ORAL 3 TIMES DAILY
Qty: 90 CAPSULE | Refills: 0 | Status: SHIPPED | OUTPATIENT
Start: 2022-09-13

## 2022-09-13 NOTE — TELEPHONE ENCOUNTER
Contacted patient to discuss the new fractures found at L1 and L2. Patient will call and schedule MRI and has valium to take preMRI she also requested refill on gabapentin/to be sent today.

## 2022-09-13 NOTE — TELEPHONE ENCOUNTER
Patient would like to speak with Nica Alas before she proceeds with scheduling MRI.  Please contact to assist.

## 2022-09-20 ENCOUNTER — HOSPITAL ENCOUNTER (OUTPATIENT)
Dept: MRI IMAGING | Age: 77
Discharge: HOME | End: 2022-09-20
Attending: NURSE PRACTITIONER

## 2022-09-20 DIAGNOSIS — S32.020A COMPRESSION FRACTURE OF L2 VERTEBRA, INITIAL ENCOUNTER (HCC): ICD-10-CM

## 2022-09-20 DIAGNOSIS — S32.010A COMPRESSION FRACTURE OF L1 VERTEBRA, INITIAL ENCOUNTER (HCC): ICD-10-CM

## 2022-09-20 PROCEDURE — 72148 MRI LUMBAR SPINE W/O DYE: CPT | Performed by: NURSE PRACTITIONER

## 2022-09-27 ENCOUNTER — TELEPHONE (OUTPATIENT)
Dept: PAIN CLINIC | Facility: CLINIC | Age: 77
End: 2022-09-27

## 2022-09-27 NOTE — TELEPHONE ENCOUNTER
Discussed with Nikolay Diehl and per Nikolay Diehl needs to set up follow up visit/video visit to discuss the results.   Contacted pt at this time ,pt agreed for a video visit,scheduled on 10/3/2022 at 11 am.Booker SÁNCHEZ
Pt called because she had her MRI done on 09/20 and she would like to speak with Lorelei Gaitan about it. Please advise.
Negative

## 2022-10-03 ENCOUNTER — TELEMEDICINE (OUTPATIENT)
Dept: PAIN CLINIC | Facility: CLINIC | Age: 77
End: 2022-10-03
Payer: MEDICARE

## 2022-10-03 DIAGNOSIS — M47.816 LUMBAR FACET ARTHROPATHY: Primary | ICD-10-CM

## 2022-10-14 RX ORDER — GABAPENTIN 100 MG/1
100 CAPSULE ORAL 3 TIMES DAILY
Qty: 90 CAPSULE | Refills: 0 | Status: SHIPPED | OUTPATIENT
Start: 2022-10-14

## 2022-10-14 NOTE — TELEPHONE ENCOUNTER
Patient calling to request refill of gabapentin 100 MG Oral [address] [place] [address] ([address]), [phone], [phone]    Patient informed of 48 hour refill policy excluding weekends and holidays. Informed patient prescription is sent directly to pharmacy. Further explained patient will not receive a call back once prescription is ready.

## 2022-10-14 NOTE — TELEPHONE ENCOUNTER
Medication: gabapentin 100 MG Oral Cap    Date of last refill: 9/13/22    Last office visit: 10/3/22  Due back to clinic per last office note:  n/a  Date next office visit scheduled:  none      Last OV note recommendation:   Plan:   >refer to Physical therapy: see order: patient to go to facility in [place]  >f/u after PT to eval response  >would consider lumbar facet injections if sx do not improve with physical therapy  >okay to continue advil/tylenol and gabapentin for pain

## 2022-10-25 ENCOUNTER — OFFICE VISIT (OUTPATIENT)
Dept: PHYSICAL THERAPY | Age: 77
End: 2022-10-25
Attending: NURSE PRACTITIONER
Payer: MEDICARE

## 2022-10-25 DIAGNOSIS — M47.816 LUMBAR FACET ARTHROPATHY: ICD-10-CM

## 2022-10-25 PROCEDURE — 97110 THERAPEUTIC EXERCISES: CPT

## 2022-10-25 PROCEDURE — 97162 PT EVAL MOD COMPLEX 30 MIN: CPT

## 2022-10-27 ENCOUNTER — OFFICE VISIT (OUTPATIENT)
Dept: PHYSICAL THERAPY | Age: 77
End: 2022-10-27
Attending: NURSE PRACTITIONER
Payer: MEDICARE

## 2022-10-27 PROCEDURE — 97110 THERAPEUTIC EXERCISES: CPT

## 2022-10-27 PROCEDURE — 97140 MANUAL THERAPY 1/> REGIONS: CPT

## 2022-10-31 ENCOUNTER — OFFICE VISIT (OUTPATIENT)
Dept: PHYSICAL THERAPY | Age: 77
End: 2022-10-31
Attending: NURSE PRACTITIONER
Payer: MEDICARE

## 2022-10-31 PROCEDURE — 97110 THERAPEUTIC EXERCISES: CPT

## 2022-10-31 PROCEDURE — 97140 MANUAL THERAPY 1/> REGIONS: CPT

## 2022-11-03 ENCOUNTER — OFFICE VISIT (OUTPATIENT)
Dept: PHYSICAL THERAPY | Age: 77
End: 2022-11-03
Attending: NURSE PRACTITIONER
Payer: MEDICARE

## 2022-11-03 PROCEDURE — 97140 MANUAL THERAPY 1/> REGIONS: CPT

## 2022-11-03 PROCEDURE — 97110 THERAPEUTIC EXERCISES: CPT

## 2022-11-08 ENCOUNTER — OFFICE VISIT (OUTPATIENT)
Dept: PHYSICAL THERAPY | Age: 77
End: 2022-11-08
Attending: NURSE PRACTITIONER
Payer: MEDICARE

## 2022-11-08 PROCEDURE — 97140 MANUAL THERAPY 1/> REGIONS: CPT

## 2022-11-08 PROCEDURE — 97110 THERAPEUTIC EXERCISES: CPT

## 2022-11-10 ENCOUNTER — APPOINTMENT (OUTPATIENT)
Dept: PHYSICAL THERAPY | Age: 77
End: 2022-11-10
Attending: NURSE PRACTITIONER
Payer: MEDICARE

## 2022-11-11 ENCOUNTER — OFFICE VISIT (OUTPATIENT)
Dept: PHYSICAL THERAPY | Age: 77
End: 2022-11-11
Attending: NURSE PRACTITIONER
Payer: MEDICARE

## 2022-11-11 PROCEDURE — 97140 MANUAL THERAPY 1/> REGIONS: CPT

## 2022-11-11 PROCEDURE — 97110 THERAPEUTIC EXERCISES: CPT

## 2022-11-14 DIAGNOSIS — Z00.00 LABORATORY EXAMINATION ORDERED AS PART OF A ROUTINE GENERAL MEDICAL EXAMINATION: ICD-10-CM

## 2022-11-14 DIAGNOSIS — M54.6 MIDLINE THORACIC BACK PAIN, UNSPECIFIED CHRONICITY: Primary | ICD-10-CM

## 2022-11-14 DIAGNOSIS — M54.50 LUMBAR BACK PAIN: ICD-10-CM

## 2022-11-14 RX ORDER — GABAPENTIN 100 MG/1
100 CAPSULE ORAL 3 TIMES DAILY
Qty: 90 CAPSULE | Refills: 0 | Status: SHIPPED | OUTPATIENT
Start: 2022-11-14

## 2022-11-14 RX ORDER — PREDNISONE 1 MG/1
5 TABLET ORAL DAILY
Qty: 90 TABLET | Refills: 0 | Status: SHIPPED | OUTPATIENT
Start: 2022-11-14 | End: 2023-02-12

## 2022-11-14 NOTE — TELEPHONE ENCOUNTER
SHE NEEDS TO HAVE THE PREDNISONE TRANSFERRED TO [facility] IN [place].    SHE DOES HAVE 2 REFILLS LEFT BUT NEEDS THIS TO BE TRANSFERRED FROM [facility] SINCE HER  RETIRED

## 2022-11-14 NOTE — TELEPHONE ENCOUNTER
LOV: 08/03/21 - called pt and scheduled a future appt.      LAST LAB: n/a    LAST RX: 5/7/22 - requesting script to be sent to [facility]     Next OV:   Future Appointments   Date Time Provider Vahe Licha   11/15/2022  1:15 PM GRISELDA Rodrigez   11/17/2022  1:00 PM GRISELDA Rodrigez [place]   11/29/2022 10:20 AM Khurram Porter MD [facility] [place]       PROTOCOL: n/a

## 2022-11-14 NOTE — TELEPHONE ENCOUNTER
Patient calling to request refill of gabapentin 100 MG Oral [address] Kasi Syed [address] [place] [address] ([address]), [phone], [phone]    Patient informed of 48 hour refill policy excluding weekends and holidays. Informed patient prescription is sent directly to pharmacy. Further explained patient will not receive a call back once prescription is ready.

## 2022-11-15 ENCOUNTER — OFFICE VISIT (OUTPATIENT)
Dept: PHYSICAL THERAPY | Age: 77
End: 2022-11-15
Attending: NURSE PRACTITIONER
Payer: MEDICARE

## 2022-11-15 ENCOUNTER — MED REC SCAN ONLY (OUTPATIENT)
Dept: FAMILY MEDICINE CLINIC | Facility: CLINIC | Age: 77
End: 2022-11-15

## 2022-11-15 PROCEDURE — 97140 MANUAL THERAPY 1/> REGIONS: CPT

## 2022-11-15 PROCEDURE — 97110 THERAPEUTIC EXERCISES: CPT

## 2022-11-17 ENCOUNTER — OFFICE VISIT (OUTPATIENT)
Dept: PHYSICAL THERAPY | Age: 77
End: 2022-11-17
Attending: NURSE PRACTITIONER
Payer: MEDICARE

## 2022-11-17 PROCEDURE — 97140 MANUAL THERAPY 1/> REGIONS: CPT

## 2022-11-17 PROCEDURE — 97110 THERAPEUTIC EXERCISES: CPT

## 2022-11-29 ENCOUNTER — LABORATORY ENCOUNTER (OUTPATIENT)
Dept: LAB | Age: 77
End: 2022-11-29
Attending: FAMILY MEDICINE
Payer: MEDICARE

## 2022-11-29 ENCOUNTER — OFFICE VISIT (OUTPATIENT)
Dept: FAMILY MEDICINE CLINIC | Facility: CLINIC | Age: 77
End: 2022-11-29
Payer: MEDICARE

## 2022-11-29 VITALS
TEMPERATURE: 98 F | RESPIRATION RATE: 12 BRPM | SYSTOLIC BLOOD PRESSURE: 138 MMHG | WEIGHT: 130 LBS | BODY MASS INDEX: 24.87 KG/M2 | HEIGHT: 60.5 IN | DIASTOLIC BLOOD PRESSURE: 68 MMHG | OXYGEN SATURATION: 97 % | HEART RATE: 107 BPM

## 2022-11-29 DIAGNOSIS — M35.3 POLYMYALGIA RHEUMATICA (HCC): ICD-10-CM

## 2022-11-29 DIAGNOSIS — E78.2 MIXED HYPERLIPIDEMIA: ICD-10-CM

## 2022-11-29 DIAGNOSIS — F17.200 TOBACCO USE DISORDER: ICD-10-CM

## 2022-11-29 DIAGNOSIS — M54.6 THORACOLUMBAR BACK PAIN: Primary | ICD-10-CM

## 2022-11-29 DIAGNOSIS — Z86.010 HX OF ADENOMATOUS COLONIC POLYPS: ICD-10-CM

## 2022-11-29 DIAGNOSIS — M54.50 THORACOLUMBAR BACK PAIN: Primary | ICD-10-CM

## 2022-11-29 DIAGNOSIS — M80.80XA OTHER OSTEOPOROSIS WITH CURRENT PATHOLOGICAL FRACTURE, INITIAL ENCOUNTER: ICD-10-CM

## 2022-11-29 DIAGNOSIS — C50.912 LOBULAR CARCINOMA OF LEFT BREAST (HCC): ICD-10-CM

## 2022-11-29 DIAGNOSIS — S22.070S COMPRESSION FRACTURE OF T9 VERTEBRA, SEQUELA: ICD-10-CM

## 2022-11-29 DIAGNOSIS — N95.1 SYMPTOMATIC MENOPAUSAL OR FEMALE CLIMACTERIC STATES: ICD-10-CM

## 2022-11-29 LAB
CHOLEST SERPL-MCNC: 209 MG/DL (ref ?–200)
FASTING PATIENT LIPID ANSWER: YES
HDLC SERPL-MCNC: 103 MG/DL (ref 40–59)
LDLC SERPL CALC-MCNC: 86 MG/DL (ref ?–100)
NONHDLC SERPL-MCNC: 106 MG/DL (ref ?–130)
TRIGL SERPL-MCNC: 121 MG/DL (ref 30–149)
VLDLC SERPL CALC-MCNC: 19 MG/DL (ref 0–30)

## 2022-11-29 PROCEDURE — G0439 PPPS, SUBSEQ VISIT: HCPCS | Performed by: FAMILY MEDICINE

## 2022-11-29 PROCEDURE — 1126F AMNT PAIN NOTED NONE PRSNT: CPT | Performed by: FAMILY MEDICINE

## 2022-11-29 PROCEDURE — 36415 COLL VENOUS BLD VENIPUNCTURE: CPT

## 2022-11-29 PROCEDURE — 80061 LIPID PANEL: CPT

## 2022-11-29 RX ORDER — SIMVASTATIN 10 MG
10 TABLET ORAL NIGHTLY
Qty: 90 TABLET | Refills: 3 | Status: SHIPPED | OUTPATIENT
Start: 2022-11-29

## 2022-11-30 DIAGNOSIS — E78.2 MIXED HYPERLIPIDEMIA: Primary | ICD-10-CM

## 2022-12-13 ENCOUNTER — OFFICE VISIT (OUTPATIENT)
Dept: PHYSICAL THERAPY | Age: 77
End: 2022-12-13
Attending: NURSE PRACTITIONER
Payer: MEDICARE

## 2022-12-13 DIAGNOSIS — M54.50 LUMBAR BACK PAIN: ICD-10-CM

## 2022-12-13 DIAGNOSIS — M54.6 MIDLINE THORACIC BACK PAIN, UNSPECIFIED CHRONICITY: ICD-10-CM

## 2022-12-13 PROCEDURE — 97110 THERAPEUTIC EXERCISES: CPT

## 2022-12-13 RX ORDER — GABAPENTIN 100 MG/1
100 CAPSULE ORAL 3 TIMES DAILY
Qty: 90 CAPSULE | Refills: 0 | Status: SHIPPED | OUTPATIENT
Start: 2022-12-13

## 2022-12-13 NOTE — TELEPHONE ENCOUNTER
Patient calling to request refill of gabapentin 100 MG Oral [address] [facility], [address] AT [address] [facility] ([address]), [phone], [phone] 150 [facility]   Phone: [phone] Fax: [phone]         Patient informed of 48 hour refill policy excluding weekends and holidays. Informed patient prescription is sent directly to pharmacy. Further explained patient will not receive a call back once prescription is ready.

## 2022-12-13 NOTE — TELEPHONE ENCOUNTER
Medication: gabapentin 100 MG Oral Cap    Date of last refill: 11/14/22    Last office visit: 10/3/22 telemedicine  Due back to clinic per last office note:  After PT  Date next office visit scheduled:  None (still doing PT)      Last OV note recommendation:   Plan:   >refer to Physical therapy: see order: patient to go to facility in [place]  >f/u after PT to eval response  >would consider lumbar facet injections if sx do not improve with physical therapy  >okay to continue advil/tylenol and gabapentin for pain

## 2022-12-16 ENCOUNTER — OFFICE VISIT (OUTPATIENT)
Dept: PHYSICAL THERAPY | Age: 77
End: 2022-12-16
Attending: NURSE PRACTITIONER
Payer: MEDICARE

## 2022-12-16 PROCEDURE — 97110 THERAPEUTIC EXERCISES: CPT

## 2022-12-16 PROCEDURE — 97140 MANUAL THERAPY 1/> REGIONS: CPT

## 2023-01-12 DIAGNOSIS — M54.6 MIDLINE THORACIC BACK PAIN, UNSPECIFIED CHRONICITY: ICD-10-CM

## 2023-01-12 DIAGNOSIS — M54.50 LUMBAR BACK PAIN: ICD-10-CM

## 2023-01-12 RX ORDER — GABAPENTIN 100 MG/1
100 CAPSULE ORAL 3 TIMES DAILY
Qty: 90 CAPSULE | Refills: 0 | Status: SHIPPED | OUTPATIENT
Start: 2023-01-12

## 2023-01-12 NOTE — TELEPHONE ENCOUNTER
Patient calling to request refill of gabapentin 100 MG Oral [address], [address] [facility] [address] [place] [address] ([address]), [phone], [phone]  Patient informed of 48 hour refill policy excluding weekends and holidays. Informed patient prescription is sent directly to pharmacy. Further explained patient will not receive a call back once prescription is ready.

## 2023-01-17 ENCOUNTER — TELEPHONE (OUTPATIENT)
Dept: NEUROLOGY | Facility: CLINIC | Age: 78
End: 2023-01-17

## 2023-01-17 ENCOUNTER — VIRTUAL PHONE E/M (OUTPATIENT)
Dept: PAIN CLINIC | Facility: CLINIC | Age: 78
End: 2023-01-17
Payer: MEDICARE

## 2023-01-17 DIAGNOSIS — M51.36 DDD (DEGENERATIVE DISC DISEASE), LUMBAR: Primary | ICD-10-CM

## 2023-01-17 DIAGNOSIS — M47.816 LUMBAR FACET ARTHROPATHY: ICD-10-CM

## 2023-01-17 DIAGNOSIS — M54.50 LUMBAR BACK PAIN: Primary | ICD-10-CM

## 2023-01-17 PROCEDURE — 99442 PHONE E/M BY PHYS 11-20 MIN: CPT | Performed by: NURSE PRACTITIONER

## 2023-01-17 NOTE — TELEPHONE ENCOUNTER
Question Answer   Anesthesia Type Local   Provider [facility]   Procedure Facet   Laterality/Level DIAGNOSTIC BILAT L4/5 FACET INJECTIONS   Medical clearance requested (will send to Pain Navigator) No   Patient has Medicare coverage?  Yes   Comments (Please list entire procedure name here.) OKAY FOR PHONE VISIT FOR F/U AFTER PROCEDURE

## 2023-01-17 NOTE — PROGRESS NOTES
Script for compound cream faxed to [facility] ([phone]) along with face sheet. Historical Rx entered into pt chart.

## 2023-01-18 NOTE — TELEPHONE ENCOUNTER
Confirmed with  that patient is fine to have injection done if taking prednisone 5mg for fibromyalgia.

## 2023-01-24 ENCOUNTER — HOSPITAL ENCOUNTER (OUTPATIENT)
Facility: HOSPITAL | Age: 78
Setting detail: HOSPITAL OUTPATIENT SURGERY
Discharge: HOME | End: 2023-01-24
Attending: ANESTHESIOLOGY | Admitting: ANESTHESIOLOGY
Payer: MEDICARE

## 2023-01-24 ENCOUNTER — APPOINTMENT (OUTPATIENT)
Dept: GENERAL RADIOLOGY | Facility: HOSPITAL | Age: 78
End: 2023-01-24
Attending: ANESTHESIOLOGY
Payer: MEDICARE

## 2023-01-24 VITALS
HEART RATE: 97 BPM | HEIGHT: 60.5 IN | WEIGHT: 130 LBS | SYSTOLIC BLOOD PRESSURE: 166 MMHG | BODY MASS INDEX: 24.87 KG/M2 | RESPIRATION RATE: 17 BRPM | DIASTOLIC BLOOD PRESSURE: 78 MMHG | TEMPERATURE: 99 F | OXYGEN SATURATION: 99 %

## 2023-01-24 PROCEDURE — 3E0U33Z INTRODUCTION OF ANTI-INFLAMMATORY INTO JOINTS, PERCUTANEOUS APPROACH: ICD-10-PCS | Performed by: ANESTHESIOLOGY

## 2023-01-24 PROCEDURE — 64494 INJ PARAVERT F JNT L/S 2 LEV: CPT | Performed by: ANESTHESIOLOGY

## 2023-01-24 PROCEDURE — 64493 INJ PARAVERT F JNT L/S 1 LEV: CPT | Performed by: ANESTHESIOLOGY

## 2023-01-24 PROCEDURE — 3E0U3BZ INTRODUCTION OF ANESTHETIC AGENT INTO JOINTS, PERCUTANEOUS APPROACH: ICD-10-PCS | Performed by: ANESTHESIOLOGY

## 2023-01-24 RX ORDER — LIDOCAINE HYDROCHLORIDE 10 MG/ML
INJECTION, SOLUTION EPIDURAL; INFILTRATION; INTRACAUDAL; PERINEURAL
Status: DISCONTINUED | OUTPATIENT
Start: 2023-01-24 | End: 2023-01-24

## 2023-01-24 RX ORDER — BUPIVACAINE HYDROCHLORIDE 5 MG/ML
INJECTION, SOLUTION EPIDURAL; INTRACAUDAL
Status: DISCONTINUED | OUTPATIENT
Start: 2023-01-24 | End: 2023-01-24

## 2023-01-24 RX ORDER — METHYLPREDNISOLONE ACETATE 40 MG/ML
INJECTION, SUSPENSION INTRA-ARTICULAR; INTRALESIONAL; INTRAMUSCULAR; SOFT TISSUE
Status: DISCONTINUED | OUTPATIENT
Start: 2023-01-24 | End: 2023-01-24

## 2023-01-24 NOTE — DISCHARGE INSTRUCTIONS
Home Care Instructions Following Your Pain Procedure     Kodak Jessica,  It has been a pleasure to have you as our patient. To help you at home, you must follow these general discharge instructions. We will review these with you before you are discharged. It is our hope that you have a complete and uneventful recovery from our procedure. General Instructions:  What to Expect:  Bandages from your procedure today can be removed when you get home. Please avoid soaking and/or swimming for 24 hours. Showering is okay  It is normal to have increased pain symptoms and/or pain at injection site for up to 3-5 days after procedure, you can use heat or ice (20 minutes on 20 minutes off) for comfort. You may experience some temporary side effects which may include restlessness or insomnia, flushing of the face, or heart palpitations. Please contact the provider if these symptoms do not resolve within 3-4 days. Lightheadedness or nausea may occur and should resolve within 24 to 48 hours. If you develop a headache after treatment, rest, drink fluids (with caffeine, if possible) and take mild over-the-counter pain medication. If the headache does not improve with the above treatment, contact the physician. Home Medications:  Resume all previously prescribed medication. Please avoid taking NSAIDs (Non-Steriodal Anti-Inflammatory Drugs) such as:  Ibuprofen ( Advil, Motrin) Aleve (Naproxen), Diclofenac, Meloxicam for 6 hours after procedure. If you are on Coumadin (Warfarin) or any other anti-coagulant (or \"blood thinning\") medication such as Plavix (Clopidogrel), Xarelto (Rivaroxaban), Eliquis (Apixaban), Effient (Prasugrel) etc., restart on the following day from the procedure unless otherwise directed by your provider. If you are a diabetic, please increase the frequency of your glucose monitoring after the procedure as steroids may cause a temporary (2-3 day) increase in your blood sugar.   Contact your primary care physician if your blood sugar remains elevated as you may require some medication adjustment. Diet:  Resume your regular diet as tolerated. Activity: We recommend that you relax and rest during the rest of your procedure day. If you feel weakness in your arms or legs do not drive. Follow-up Appointment  Please schedule a follow-up visit within 3 to 4 weeks after your last procedure date. Question or Concerns:  Feel free to call our office with any questions or concerns at [phone] (option #2)    Dali Matute  Thank you for coming to [facility] for your procedure. The nurses try very hard to make sure you receive the best care possible. Your trust in them as well as us is greatly appreciated.     Thanks so much,   Dr. Roman Alatorre

## 2023-01-24 NOTE — OPERATIVE REPORT
[facility]  Operative Report  2023     Hortencia Louis Patient Status:  Hospital Outpatient Surgery    1945 MRN [id number]   Location [address] [address] Attending Nasreen Mejia MD   Hosp Day # 0 PCP Chrissy Prince MD     Indication: Janie Sapp is a 68year old female with a history of low back pain    Imaging: MRI reviewed    Preoperative Diagnosis:  DDD (degenerative disc disease), lumbar [M51.36]    Postoperative Diagnosis: Same as above. Procedure performed: LUMBAR FACET INJECTION BILATERAL WITH LOCAL (L3, L4, L5)    Anesthesia: Local     EBL: Less than 1 ml. Procedure Description:   After reviewing the patient's history and performing a focused physical examination, the diagnosis was confirmed and contraindications such as infection and coagulopathy were ruled out. Following review of potential side effects and complications, including but not necessarily limited to infection, allergic reaction, local tissue breakdown, nerve injury, and paresis, the patient indicated they understood and agreed to proceed. After obtaining the informed consent, the patient was brought to the procedure room and monitored. In the prone position, following sterile prep and drape of the lumbar region, the corresponding facet joints were identified under fluoroscopy. The skin was anesthetized via 25-gauge 1.5\" needle with approximately 2 mL of 1% lidocaine. A 22-gauge 3.5\" Quincke spinal needle was introduced and advanced into the left L3, L4, L5 levels atraumatically under fluoroscopic guidance. Following negative aspiration for CSF and blood, approximately 1 mL of 1% lidocaine with 5 mg of methylprednisolone was injected into each joint without complication. The needle was withdrawn with stylet in situ after being flushed with 0.5 mL lidocaine. The contralateral injections were performed in the similar fashion. The patient tolerated procedure very well.   The patient was observed until discharge criteria met. Discharge instructions were given and patient was released to a responsible adult. Complications: None. Follow up: The patient was followed in the pain clinic as needed basis.       Michelle Zepeda MD

## 2023-01-25 ENCOUNTER — TELEPHONE (OUTPATIENT)
Dept: PAIN CLINIC | Facility: CLINIC | Age: 78
End: 2023-01-25

## 2023-01-25 NOTE — TELEPHONE ENCOUNTER
Courtesy called placed to patient for post procedure follow up. Patient stated she is doing good so far. Patient mentioned she has not taken any over the counter NSAIDS as she wants to see if injection is helping. Pt verbalized understanding to call with any questions or concerns.       Procedure: Bilateral Lumbar Facet   Date: 01/24/2023  Follow up Visit Scheduled: 02/07/2023 @ 11AM with Endy Pineda -phone visit

## 2023-02-07 ENCOUNTER — TELEPHONE (OUTPATIENT)
Dept: PAIN CLINIC | Facility: CLINIC | Age: 78
End: 2023-02-07

## 2023-02-07 ENCOUNTER — VIRTUAL PHONE E/M (OUTPATIENT)
Dept: PAIN CLINIC | Facility: CLINIC | Age: 78
End: 2023-02-07
Payer: MEDICARE

## 2023-02-07 DIAGNOSIS — M47.816 LUMBAR FACET ARTHROPATHY: ICD-10-CM

## 2023-02-07 DIAGNOSIS — M54.50 LUMBAR BACK PAIN: ICD-10-CM

## 2023-02-07 PROCEDURE — 99213 OFFICE O/P EST LOW 20 MIN: CPT | Performed by: NURSE PRACTITIONER

## 2023-02-07 NOTE — TELEPHONE ENCOUNTER
Pt requested refill for her DCL topical during her visit with Maikel Grace. Script faxed to [facility] at this time with confirmation received.

## 2023-02-13 ENCOUNTER — TELEPHONE (OUTPATIENT)
Dept: PAIN CLINIC | Facility: CLINIC | Age: 78
End: 2023-02-13

## 2023-02-13 DIAGNOSIS — M54.50 LUMBAR BACK PAIN: ICD-10-CM

## 2023-02-13 DIAGNOSIS — Z00.00 LABORATORY EXAMINATION ORDERED AS PART OF A ROUTINE GENERAL MEDICAL EXAMINATION: ICD-10-CM

## 2023-02-13 DIAGNOSIS — M47.816 LUMBAR FACET ARTHROPATHY: Primary | ICD-10-CM

## 2023-02-13 DIAGNOSIS — M54.6 MIDLINE THORACIC BACK PAIN, UNSPECIFIED CHRONICITY: ICD-10-CM

## 2023-02-13 DIAGNOSIS — M35.3 POLYMYALGIA RHEUMATICA (HCC): Primary | ICD-10-CM

## 2023-02-13 RX ORDER — GABAPENTIN 100 MG/1
100 CAPSULE ORAL 3 TIMES DAILY
Qty: 90 CAPSULE | Refills: 0 | Status: SHIPPED | OUTPATIENT
Start: 2023-02-13

## 2023-02-13 RX ORDER — PREDNISONE 1 MG/1
5 TABLET ORAL DAILY
Qty: 90 TABLET | Refills: 0 | Status: SHIPPED | OUTPATIENT
Start: 2023-02-13 | End: 2023-02-14

## 2023-02-13 NOTE — TELEPHONE ENCOUNTER
Has not seen rheumatology that I can see and has compression fractures, may be time for a change in therapy. Talk to PCP on return.

## 2023-02-13 NOTE — TELEPHONE ENCOUNTER
Patient calling to request refill of gabapentin 100 MG Oral [address], [address] ([address]), [phone], [phone]    Patient informed of 48 hour refill policy excluding weekends and holidays. Informed patient prescription is sent directly to pharmacy. Further explained patient will not receive a call back once prescription is ready.

## 2023-02-13 NOTE — TELEPHONE ENCOUNTER
Pt calling in stating that she would like to move ahead with the  radiofrequency bilateral medial branches at L4-5 under local with Dr. Sanford García that [employer] recommended during their phone visit on 2/7/23. Please advise.

## 2023-02-13 NOTE — TELEPHONE ENCOUNTER
Question Answer   Anesthesia Type Local   Provider Peng   Location Lab   Procedure RFA   Laterality/Level BILATERAL L4/5 MB   CPT (Hit enter after each entry) DESTROY LUMB/SAC FACET JNT   Medical clearance requested (will send to Pain Navigator) No   Patient has Medicare coverage?  Yes

## 2023-02-13 NOTE — TELEPHONE ENCOUNTER
Spoke with patient - clarified reason why taking medication - had lumbar injection on 1/24/23     Takes prednisone for polymyalgia     Last refill 11/14/2022

## 2023-02-14 RX ORDER — PREDNISONE 1 MG/1
5 TABLET ORAL DAILY
Qty: 7 TABLET | Refills: 0 | Status: SHIPPED | OUTPATIENT
Start: 2023-02-14 | End: 2023-02-21

## 2023-02-14 NOTE — TELEPHONE ENCOUNTER
Can she have 1 week's worth of Prednisone? At this time she has 6 pills left. Then she'll be able to discuss with Dr. Olive Braga what the the next steps will be.    I pended a script for 7 days

## 2023-02-23 ENCOUNTER — HOSPITAL ENCOUNTER (OUTPATIENT)
Facility: HOSPITAL | Age: 78
Setting detail: HOSPITAL OUTPATIENT SURGERY
Discharge: HOME | End: 2023-02-23
Attending: ANESTHESIOLOGY | Admitting: ANESTHESIOLOGY
Payer: MEDICARE

## 2023-02-23 ENCOUNTER — APPOINTMENT (OUTPATIENT)
Dept: GENERAL RADIOLOGY | Facility: HOSPITAL | Age: 78
End: 2023-02-23
Attending: ANESTHESIOLOGY
Payer: MEDICARE

## 2023-02-23 VITALS
HEIGHT: 65 IN | RESPIRATION RATE: 16 BRPM | BODY MASS INDEX: 21.66 KG/M2 | HEART RATE: 107 BPM | OXYGEN SATURATION: 97 % | DIASTOLIC BLOOD PRESSURE: 86 MMHG | SYSTOLIC BLOOD PRESSURE: 153 MMHG | WEIGHT: 130 LBS | TEMPERATURE: 98 F

## 2023-02-23 PROCEDURE — 64635 DESTROY LUMB/SAC FACET JNT: CPT | Performed by: ANESTHESIOLOGY

## 2023-02-23 PROCEDURE — BR161ZZ FLUOROSCOPY OF LUMBAR FACET JOINT(S) USING LOW OSMOLAR CONTRAST: ICD-10-PCS | Performed by: ANESTHESIOLOGY

## 2023-02-23 PROCEDURE — 3E0T3TZ INTRODUCTION OF DESTRUCTIVE AGENT INTO PERIPHERAL NERVES AND PLEXI, PERCUTANEOUS APPROACH: ICD-10-PCS | Performed by: ANESTHESIOLOGY

## 2023-02-23 PROCEDURE — 64636 DESTROY L/S FACET JNT ADDL: CPT | Performed by: ANESTHESIOLOGY

## 2023-02-23 RX ORDER — LIDOCAINE HYDROCHLORIDE 10 MG/ML
INJECTION, SOLUTION EPIDURAL; INFILTRATION; INTRACAUDAL; PERINEURAL
Status: DISCONTINUED | OUTPATIENT
Start: 2023-02-23 | End: 2023-02-23

## 2023-02-23 RX ORDER — METHYLPREDNISOLONE ACETATE 40 MG/ML
INJECTION, SUSPENSION INTRA-ARTICULAR; INTRALESIONAL; INTRAMUSCULAR; SOFT TISSUE
Status: DISCONTINUED | OUTPATIENT
Start: 2023-02-23 | End: 2023-02-23

## 2023-02-23 NOTE — DISCHARGE INSTRUCTIONS
Home Care Instructions Following Your Pain Procedure     Therese Tran,  It has been a pleasure to have you as our patient. To help you at home, you must follow these general discharge instructions. We will review these with you before you are discharged. It is our hope that you have a complete and uneventful recovery from our procedure. General Instructions:  What to Expect:  Bandages from your procedure today can be removed when you get home. Please avoid soaking and/or swimming for 24 hours. Showering is okay  It is normal to have increased pain symptoms and/or pain at injection site for up to 3-5 days after procedure, you can use heat or ice (20 minutes on 20 minutes off) for comfort. You may experience some temporary side effects which may include restlessness or insomnia, flushing of the face, or heart palpitations. Please contact the provider if these symptoms do not resolve within 3-4 days. Lightheadedness or nausea may occur and should resolve within 24 to 48 hours. If you develop a headache after treatment, rest, drink fluids (with caffeine, if possible) and take mild over-the-counter pain medication. If the headache does not improve with the above treatment, contact the physician. Home Medications:  Resume all previously prescribed medication. Please avoid taking NSAIDs (Non-Steriodal Anti-Inflammatory Drugs) such as:  Ibuprofen ( Advil, Motrin) Aleve (Naproxen), Diclofenac, Meloxicam for 6 hours after procedure. If you are on Coumadin (Warfarin) or any other anti-coagulant (or \"blood thinning\") medication such as Plavix (Clopidogrel), Xarelto (Rivaroxaban), Eliquis (Apixaban), Effient (Prasugrel) etc., restart on the following day from the procedure unless otherwise directed by your provider. If you are a diabetic, please increase the frequency of your glucose monitoring after the procedure as steroids may cause a temporary (2-3 day) increase in your blood sugar.   Contact your primary care physician if your blood sugar remains elevated as you may require some medication adjustment. Diet:  Resume your regular diet as tolerated. Activity: We recommend that you relax and rest during the rest of your procedure day. If you feel weakness in your arms or legs do not drive. Follow-up Appointment  Please schedule a follow-up visit within 3 to 4 weeks after your last procedure date. Question or Concerns:  Feel free to call our office with any questions or concerns at [phone] (option #2)    Dali Matute  Thank you for coming to [facility] for your procedure. The nurses try very hard to make sure you receive the best care possible. Your trust in them as well as us is greatly appreciated.     Thanks so much,   Dr. Roman Alatorre

## 2023-02-23 NOTE — OPERATIVE REPORT
[facility]  Operative Report  2023     Harrison Calderon Patient Status:  Hospital Outpatient Surgery    1945 MRN [id number]   Location [address] [address] Attending Teodora Pagan MD   Hosp Day # 0 PCP Daphne Lui MD     Indication: Shantanu Taveras is a 68year old female with lumbar degenerative disc disease and facet arthropathy    Imaging: MRI reviewed    Preoperative Diagnosis:  Lumbar facet arthropathy [M47.816]    Postoperative Diagnosis: Same as above. Procedure performed: BILATERAL RADIOFREQUENCY ABLATION OF LUMBAR MEDIAL BRANCH L4/5 with local  (L3-4, L4-5, L5-S1)    Anesthesia: Local    EBL: Less than 1 ml. Procedure Description:   After reviewing the patient's history and performing a focused physical examination, the diagnosis was confirmed and contraindications such as infection and coagulopathy were ruled out. Following review of potential side effects and complications, including but not necessarily limited to infection, allergic reaction, local tissue breakdown, nerve injury, and paresis, the patient indicated they understood and agreed to proceed. After obtaining the informed consent, the patient was brought to the procedure room and monitored. The patient was placed prone on the table. The patient's back was prepped and draped in sterile fashion. Attention was turned towards the patient's right side first. the skin was anesthetized via 25-gauge 1.5\" needle with approximately 2 mL of 1% lidocaine for local anesthesia. Under fluoroscopic guidance, a 22-gauge, 100-mm SMK needle was advanced to the junction of the superior aspect of the L3 transverse process and the lateral aspect of the same level superior articular process at right L3 level . The needle was then walked off the bony tissue and advanced 2 to 3 mm to lie along the path of the L3 medial branch nerve. AP and lateral radiographs were obtained to document proper needle position.   Sensory and motor stimulation were then performed, which elicited deep local back discomfort but no evidence of motor stimulation in the gluteal muscles or extremities. At this point, 0.5 mL of 1% lidocaine was injected to the tissues around the tip of the SMK needle. Subsequently, a medial branch nerve denervation was performed for 90 seconds at 80 degrees centigrade without complication. Similar procedures were performed at right L4-L5 and L5-S1 level. The radiofrequency probe was removed with the needle left in place and 1% lidocaine and 5 mg methylprednisolone was injected through each needle. The needles were removed with tips intact. This was then repeated on the patient's contralateral side. In total 6 needles were used to complete radiofrequency ablation of the bilateral L4 and L5 medial branches. The patient tolerated the procedure very well. There was no subjective or objective loss of motor strength. The patient was observed until discharge criteria met. Discharge instructions were given and patient was released to a responsible adult. Complications: None. Follow up: The patient will be followed in the pain clinic as needed basis.       Jalil Almendarez MD

## 2023-02-24 ENCOUNTER — TELEPHONE (OUTPATIENT)
Dept: PAIN CLINIC | Facility: CLINIC | Age: 78
End: 2023-02-24

## 2023-02-24 NOTE — TELEPHONE ENCOUNTER
Follow-up call post pain procedure. Left message informing patient to contact the pain clinic at [phone] option #2 regarding any questions or concerns about recent pain procedure.     Procedure: BILATERAL RADIOFREQUENCY ABLATION OF LUMBAR MEDIAL BRANCH L4/5 with local  Date: 02/23/23  Follow up Visit Scheduled: 03/21/23

## 2023-03-14 DIAGNOSIS — M54.50 LUMBAR BACK PAIN: ICD-10-CM

## 2023-03-14 DIAGNOSIS — M54.6 MIDLINE THORACIC BACK PAIN, UNSPECIFIED CHRONICITY: ICD-10-CM

## 2023-03-14 RX ORDER — GABAPENTIN 100 MG/1
100 CAPSULE ORAL 3 TIMES DAILY
Qty: 90 CAPSULE | Refills: 0 | Status: SHIPPED | OUTPATIENT
Start: 2023-03-14

## 2023-03-14 NOTE — TELEPHONE ENCOUNTER
Patient calling to request refill of gabapentin 100 MG Oral [address], [address] [facility] [address] [place] [address] ([address]), [phone], [phone]    Patient informed of 48 hour refill policy excluding weekends and holidays. Informed patient prescription is sent directly to pharmacy. Further explained patient will not receive a call back once prescription is ready.

## 2023-03-14 NOTE — TELEPHONE ENCOUNTER
Medication: Gabapntin    Date of last refill: 2/13/2023  [place] 1/91/5861    Last office visit: 2/7/2023 Virtual visit  Due back to clinic per last office note:  n/a  Date next office visit scheduled:  3/21/2023    Last URINE Screen: N.a  Screen Results:  N/a    Narcotic Contract EXPIRATION date: n/a    Last OV note recommendation: Plan:   > Continue gabapentin 100 mg 3 times daily  > Okay to use Advil dual action 3 times daily as needed for pain  > Continue home exercise  > Recommend radiofrequency bilateral medial branches at L4-5 under local with Dr. Rebeka Trammell ENG: Patient will discuss with her  and notify office if she wishes to proceed  > Refill transdermal topical formulation diclofenac 3%, cyclobenzaprine 2%, lidocaine 2% from compounding pharmacy today with 5 refills  No orders of the defined types were placed in this encounter.

## 2023-03-21 ENCOUNTER — VIRTUAL PHONE E/M (OUTPATIENT)
Dept: PAIN CLINIC | Facility: CLINIC | Age: 78
End: 2023-03-21
Payer: MEDICARE

## 2023-03-21 DIAGNOSIS — M80.00XA PATHOLOGICAL FRACTURE OF OTHER SITE DUE TO OSTEOPOROSIS, UNSPECIFIED OSTEOPOROSIS TYPE, INITIAL ENCOUNTER: ICD-10-CM

## 2023-03-21 DIAGNOSIS — S32.020A COMPRESSION FRACTURE OF L2 VERTEBRA, INITIAL ENCOUNTER (HCC): ICD-10-CM

## 2023-03-21 DIAGNOSIS — M54.50 LUMBAR BACK PAIN: Primary | ICD-10-CM

## 2023-03-21 DIAGNOSIS — M54.6 MIDLINE THORACIC BACK PAIN, UNSPECIFIED CHRONICITY: ICD-10-CM

## 2023-03-21 PROCEDURE — 99213 OFFICE O/P EST LOW 20 MIN: CPT | Performed by: NURSE PRACTITIONER

## 2023-04-12 DIAGNOSIS — M54.6 MIDLINE THORACIC BACK PAIN, UNSPECIFIED CHRONICITY: ICD-10-CM

## 2023-04-12 DIAGNOSIS — M54.50 LUMBAR BACK PAIN: ICD-10-CM

## 2023-04-12 RX ORDER — GABAPENTIN 100 MG/1
100 CAPSULE ORAL 3 TIMES DAILY
Qty: 90 CAPSULE | Refills: 0 | Status: SHIPPED | OUTPATIENT
Start: 2023-04-12

## 2023-04-12 NOTE — TELEPHONE ENCOUNTER
Patient calling to request refill of gabapentin 100 MG Oral [address], [address] [facility] [address] [place] [address] ([address]), [phone], [phone]  Patient informed of 48 hour refill policy excluding weekends and holidays. Informed patient prescription is sent directly to pharmacy. Further explained patient will not receive a call back once prescription is ready.

## 2023-05-02 ENCOUNTER — VIRTUAL PHONE E/M (OUTPATIENT)
Dept: PAIN CLINIC | Facility: CLINIC | Age: 78
End: 2023-05-02
Payer: MEDICARE

## 2023-05-02 DIAGNOSIS — M54.6 MIDLINE THORACIC BACK PAIN, UNSPECIFIED CHRONICITY: Primary | ICD-10-CM

## 2023-05-02 DIAGNOSIS — G89.29 OTHER CHRONIC PAIN: ICD-10-CM

## 2023-05-02 DIAGNOSIS — M80.00XG AGE-RELATED OSTEOPOROSIS WITH CURRENT PATHOLOGICAL FRACTURE WITH DELAYED HEALING, SUBSEQUENT ENCOUNTER: ICD-10-CM

## 2023-05-02 DIAGNOSIS — F43.21 SITUATIONAL DEPRESSION: ICD-10-CM

## 2023-05-02 PROCEDURE — 99213 OFFICE O/P EST LOW 20 MIN: CPT | Performed by: NURSE PRACTITIONER

## 2023-05-02 RX ORDER — DULOXETIN HYDROCHLORIDE 20 MG/1
20 CAPSULE, DELAYED RELEASE ORAL DAILY
Qty: 30 CAPSULE | Refills: 0 | Status: SHIPPED | OUTPATIENT
Start: 2023-05-02

## 2023-05-05 ENCOUNTER — TELEPHONE (OUTPATIENT)
Dept: PAIN CLINIC | Facility: CLINIC | Age: 78
End: 2023-05-05

## 2023-05-11 DIAGNOSIS — M54.6 MIDLINE THORACIC BACK PAIN, UNSPECIFIED CHRONICITY: ICD-10-CM

## 2023-05-11 DIAGNOSIS — M54.50 LUMBAR BACK PAIN: ICD-10-CM

## 2023-05-11 RX ORDER — GABAPENTIN 100 MG/1
100 CAPSULE ORAL 3 TIMES DAILY
Qty: 90 CAPSULE | Refills: 0 | Status: SHIPPED | OUTPATIENT
Start: 2023-05-11

## 2023-05-11 NOTE — TELEPHONE ENCOUNTER
Patient calling to request refill of gabapentin 100 MG Oral [address], [address] [facility] [address] [place] [address] ([address]), [phone], [phone]    Patient informed of 48 hour refill policy excluding weekends and holidays. Informed patient prescription is sent directly to pharmacy. Further explained patient will not receive a call back once prescription is ready.

## 2023-05-12 DIAGNOSIS — M35.3 POLYMYALGIA RHEUMATICA (HCC): ICD-10-CM

## 2023-05-12 RX ORDER — PREDNISONE 5 MG/1
5 TABLET ORAL DAILY
Qty: 7 TABLET | Refills: 0 | Status: SHIPPED | OUTPATIENT
Start: 2023-05-12 | End: 2023-05-19

## 2023-05-12 NOTE — TELEPHONE ENCOUNTER
Last office visit: 11/29/22  Last refill: 2/14/23  Labs Due: 5/30/23  Future Appointments   Date Time Provider Vahe Hurt   6/5/2023 11:00 AM GERARD Brown [facility] [place]   10/25/2023 11:40 AM Sabrina Wang MD [facility]     Spoke with patient who states she has polymyalgia. She states she takes the prednisone for that.

## 2023-05-23 ENCOUNTER — TELEPHONE (OUTPATIENT)
Dept: FAMILY MEDICINE CLINIC | Facility: CLINIC | Age: 78
End: 2023-05-23

## 2023-05-23 DIAGNOSIS — M35.3 POLYMYALGIA RHEUMATICA (HCC): ICD-10-CM

## 2023-05-23 RX ORDER — PREDNISONE 5 MG/1
5 TABLET ORAL DAILY
Qty: 90 TABLET | Refills: 0 | Status: SHIPPED | OUTPATIENT
Start: 2023-05-23 | End: 2023-08-21

## 2023-05-23 NOTE — TELEPHONE ENCOUNTER
Pt states she has Polymyalgia Rheumatica & takes prednisone daily. Last refill was for 7 tablets only. Feb 13 2023 she did get #90.   Please advise

## 2023-06-05 ENCOUNTER — VIRTUAL PHONE E/M (OUTPATIENT)
Dept: PAIN CLINIC | Facility: CLINIC | Age: 78
End: 2023-06-05
Payer: MEDICARE

## 2023-06-05 DIAGNOSIS — F43.21 SITUATIONAL DEPRESSION: ICD-10-CM

## 2023-06-05 DIAGNOSIS — M54.6 MIDLINE THORACIC BACK PAIN, UNSPECIFIED CHRONICITY: Primary | ICD-10-CM

## 2023-06-05 DIAGNOSIS — M80.00XG AGE-RELATED OSTEOPOROSIS WITH CURRENT PATHOLOGICAL FRACTURE WITH DELAYED HEALING, SUBSEQUENT ENCOUNTER: ICD-10-CM

## 2023-06-05 RX ORDER — DULOXETIN HYDROCHLORIDE 30 MG/1
30 CAPSULE, DELAYED RELEASE ORAL DAILY
Qty: 30 CAPSULE | Refills: 0 | Status: SHIPPED | OUTPATIENT
Start: 2023-06-05

## 2023-06-06 ENCOUNTER — TELEPHONE (OUTPATIENT)
Dept: PAIN CLINIC | Facility: CLINIC | Age: 78
End: 2023-06-06
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
## 2023-06-13 DIAGNOSIS — M54.50 LUMBAR BACK PAIN: ICD-10-CM

## 2023-06-13 DIAGNOSIS — M54.6 MIDLINE THORACIC BACK PAIN, UNSPECIFIED CHRONICITY: ICD-10-CM

## 2023-06-13 RX ORDER — GABAPENTIN 100 MG/1
100 CAPSULE ORAL 3 TIMES DAILY
Qty: 90 CAPSULE | Refills: 0 | Status: SHIPPED | OUTPATIENT
Start: 2023-06-13

## 2023-06-13 NOTE — TELEPHONE ENCOUNTER
Patient calling to request refill of   gabapentin 100 MG Oral [address], [address] [facility] [address] [place] [address] ([address]), [phone], [phone]  Patient informed of 48 hour refill policy excluding weekends and holidays. Informed patient prescription is sent directly to pharmacy. Further explained patient will not receive a call back once prescription is ready.

## 2023-06-20 ENCOUNTER — OFFICE VISIT (OUTPATIENT)
Facility: CLINIC | Age: 78
End: 2023-06-20
Payer: MEDICARE

## 2023-06-20 ENCOUNTER — LAB ENCOUNTER (OUTPATIENT)
Dept: LAB | Age: 78
End: 2023-06-20
Attending: STUDENT IN AN ORGANIZED HEALTH CARE EDUCATION/TRAINING PROGRAM
Payer: MEDICARE

## 2023-06-20 VITALS — OXYGEN SATURATION: 96 % | DIASTOLIC BLOOD PRESSURE: 84 MMHG | HEART RATE: 97 BPM | SYSTOLIC BLOOD PRESSURE: 136 MMHG

## 2023-06-20 DIAGNOSIS — M81.0 AGE-RELATED OSTEOPOROSIS WITHOUT CURRENT PATHOLOGICAL FRACTURE: ICD-10-CM

## 2023-06-20 DIAGNOSIS — E04.1 THYROID NODULE: ICD-10-CM

## 2023-06-20 DIAGNOSIS — M80.80XA OTHER OSTEOPOROSIS WITH CURRENT PATHOLOGICAL FRACTURE, INITIAL ENCOUNTER: ICD-10-CM

## 2023-06-20 DIAGNOSIS — M81.8 OTHER OSTEOPOROSIS WITHOUT CURRENT PATHOLOGICAL FRACTURE: ICD-10-CM

## 2023-06-20 DIAGNOSIS — M81.0 AGE-RELATED OSTEOPOROSIS WITHOUT CURRENT PATHOLOGICAL FRACTURE: Primary | ICD-10-CM

## 2023-06-20 LAB
ANION GAP SERPL CALC-SCNC: 3 MMOL/L (ref 0–18)
BUN BLD-MCNC: 19 MG/DL (ref 7–18)
CALCIUM BLD-MCNC: 10.1 MG/DL (ref 8.5–10.1)
CHLORIDE SERPL-SCNC: 105 MMOL/L (ref 98–112)
CO2 SERPL-SCNC: 30 MMOL/L (ref 21–32)
CREAT BLD-MCNC: 0.87 MG/DL
FASTING STATUS PATIENT QL REPORTED: NO
GFR SERPLBLD BASED ON 1.73 SQ M-ARVRAT: 69 ML/MIN/1.73M2 (ref 60–?)
GLUCOSE BLD-MCNC: 114 MG/DL (ref 70–99)
OSMOLALITY SERPL CALC.SUM OF ELEC: 289 MOSM/KG (ref 275–295)
POTASSIUM SERPL-SCNC: 5.2 MMOL/L (ref 3.5–5.1)
PTH-INTACT SERPL-MCNC: 30.1 PG/ML (ref 18.5–88)
SODIUM SERPL-SCNC: 138 MMOL/L (ref 136–145)
TSI SER-ACNC: 0.74 MIU/ML (ref 0.36–3.74)
VIT D+METAB SERPL-MCNC: 35.3 NG/ML (ref 30–100)

## 2023-06-20 PROCEDURE — 83970 ASSAY OF PARATHORMONE: CPT

## 2023-06-20 PROCEDURE — 84080 ASSAY ALKALINE PHOSPHATASES: CPT

## 2023-06-20 PROCEDURE — 36415 COLL VENOUS BLD VENIPUNCTURE: CPT

## 2023-06-20 PROCEDURE — 82306 VITAMIN D 25 HYDROXY: CPT

## 2023-06-20 PROCEDURE — 99205 OFFICE O/P NEW HI 60 MIN: CPT | Performed by: STUDENT IN AN ORGANIZED HEALTH CARE EDUCATION/TRAINING PROGRAM

## 2023-06-20 PROCEDURE — 84443 ASSAY THYROID STIM HORMONE: CPT

## 2023-06-20 PROCEDURE — 80048 BASIC METABOLIC PNL TOTAL CA: CPT

## 2023-06-21 ENCOUNTER — TELEPHONE (OUTPATIENT)
Facility: CLINIC | Age: 78
End: 2023-06-21

## 2023-06-21 NOTE — TELEPHONE ENCOUNTER
Per Dr. Jhonathan Mayes, to begin Prolia benefits verification for patient. Entered patient into [email or web address] Assist, will await benefit determination.

## 2023-06-22 NOTE — TELEPHONE ENCOUNTER
RN phoned patient to inform her that prolia paperwork could be started. Per [employer] Summary of Benefits: authorization is not required, Primary $226 met of $226 Required, Prolia OOP COST $0. States \"This is a Medicare Supplement Plan G and it covers the Medicare Part B co-insurance and 100% of the excess charges. \"    Patient informed of [employer] Benefits summary. Advised to call Medicaid to ensure information is correct. Patient verbalized understanding. Patient will also look for dental information and call office when retrieved.

## 2023-06-23 LAB — ALKALINE PHOSPHATASE BONE SPECIFIC: 11.5 UG/L

## 2023-06-26 ENCOUNTER — TELEPHONE (OUTPATIENT)
Dept: FAMILY MEDICINE CLINIC | Facility: CLINIC | Age: 78
End: 2023-06-26

## 2023-06-26 NOTE — TELEPHONE ENCOUNTER
Patient phoned patient, per request, with dental information. States using  [address],   [address], [place], [address]. Phone number: [phone]. Per gina Willard with out dental clearance due to patient having only 3 teeth.

## 2023-06-27 ENCOUNTER — HOSPITAL ENCOUNTER (OUTPATIENT)
Dept: ULTRASOUND IMAGING | Age: 78
Discharge: HOME | End: 2023-06-27
Attending: STUDENT IN AN ORGANIZED HEALTH CARE EDUCATION/TRAINING PROGRAM
Payer: MEDICARE

## 2023-06-27 ENCOUNTER — HOSPITAL ENCOUNTER (OUTPATIENT)
Dept: BONE DENSITY | Age: 78
Discharge: HOME | End: 2023-06-27
Attending: STUDENT IN AN ORGANIZED HEALTH CARE EDUCATION/TRAINING PROGRAM
Payer: MEDICARE

## 2023-06-27 DIAGNOSIS — M81.0 AGE-RELATED OSTEOPOROSIS WITHOUT CURRENT PATHOLOGICAL FRACTURE: ICD-10-CM

## 2023-06-27 DIAGNOSIS — M80.80XA OTHER OSTEOPOROSIS WITH CURRENT PATHOLOGICAL FRACTURE, INITIAL ENCOUNTER: ICD-10-CM

## 2023-06-27 DIAGNOSIS — E04.1 THYROID NODULE: ICD-10-CM

## 2023-06-27 PROCEDURE — 77080 DXA BONE DENSITY AXIAL: CPT | Performed by: STUDENT IN AN ORGANIZED HEALTH CARE EDUCATION/TRAINING PROGRAM

## 2023-06-27 PROCEDURE — 76536 US EXAM OF HEAD AND NECK: CPT | Performed by: STUDENT IN AN ORGANIZED HEALTH CARE EDUCATION/TRAINING PROGRAM

## 2023-06-28 ENCOUNTER — TELEPHONE (OUTPATIENT)
Facility: CLINIC | Age: 78
End: 2023-06-28

## 2023-07-03 ENCOUNTER — VIRTUAL PHONE E/M (OUTPATIENT)
Dept: PAIN CLINIC | Facility: CLINIC | Age: 78
End: 2023-07-03
Payer: MEDICARE

## 2023-07-03 DIAGNOSIS — F43.21 SITUATIONAL DEPRESSION: ICD-10-CM

## 2023-07-03 DIAGNOSIS — M80.00XA PATHOLOGICAL FRACTURE OF OTHER SITE DUE TO OSTEOPOROSIS, UNSPECIFIED OSTEOPOROSIS TYPE, INITIAL ENCOUNTER: ICD-10-CM

## 2023-07-03 DIAGNOSIS — M80.00XG AGE-RELATED OSTEOPOROSIS WITH CURRENT PATHOLOGICAL FRACTURE WITH DELAYED HEALING, SUBSEQUENT ENCOUNTER: ICD-10-CM

## 2023-07-03 DIAGNOSIS — M54.6 MIDLINE THORACIC BACK PAIN, UNSPECIFIED CHRONICITY: Primary | ICD-10-CM

## 2023-07-03 PROCEDURE — 99214 OFFICE O/P EST MOD 30 MIN: CPT | Performed by: NURSE PRACTITIONER

## 2023-07-03 RX ORDER — DULOXETIN HYDROCHLORIDE 30 MG/1
30 CAPSULE, DELAYED RELEASE ORAL DAILY
Qty: 30 CAPSULE | Refills: 2 | Status: SHIPPED | OUTPATIENT
Start: 2023-07-03

## 2023-07-06 ENCOUNTER — NURSE ONLY (OUTPATIENT)
Facility: CLINIC | Age: 78
End: 2023-07-06
Payer: MEDICARE

## 2023-07-06 DIAGNOSIS — M81.0 AGE-RELATED OSTEOPOROSIS WITHOUT CURRENT PATHOLOGICAL FRACTURE: Primary | ICD-10-CM

## 2023-07-06 PROCEDURE — 96372 THER/PROPH/DIAG INJ SC/IM: CPT | Performed by: STUDENT IN AN ORGANIZED HEALTH CARE EDUCATION/TRAINING PROGRAM

## 2023-07-06 NOTE — PROGRESS NOTES
Patient in office for first time Prolia administration. Reviewed Prolia administration, possible side effects. Administered Prolia 60mg in left upper arm subcutaneously at 1048. Patient tolerated well with no complaints. Patient stayed in office for 15 minutes post administration. No complaints, no side effects noted. Patient instructed to have Calcium level checked in 7-10 days. Next Prolia injection (injection #2) scheduled for 01/11/24 at 1130. Appointment reminder card given.

## 2023-07-10 ENCOUNTER — TELEPHONE (OUTPATIENT)
Dept: FAMILY MEDICINE CLINIC | Facility: CLINIC | Age: 78
End: 2023-07-10

## 2023-07-10 DIAGNOSIS — M80.80XA OTHER OSTEOPOROSIS WITH CURRENT PATHOLOGICAL FRACTURE, INITIAL ENCOUNTER: Primary | ICD-10-CM

## 2023-07-10 NOTE — TELEPHONE ENCOUNTER
PT CALLING TO SCHEDULE LAB WORK NEEDED TO BE DONE 7-10 DAYS AFTER GETTING PROLIA. NO ORDER IN THE SYSTEM. CAN DR. FAIRBANKS PLACE ORDER OR DOES IT HAVE TO COME FROM ENDOCRINOLOGY?

## 2023-07-10 NOTE — TELEPHONE ENCOUNTER
Nursing notes reviewed form Prolia injection given on 7-6-23, to have calcium level checked in 7-10 days. Patient wondering if order can placed so can come here for blood work.  (See original note)

## 2023-07-10 NOTE — TELEPHONE ENCOUNTER
Patient notified and appointment scheduled    Future Appointments   Date Time Provider Vahe Hurt   7/13/2023  9:00 AM REF [facility] REF [facility] Ref Lab [place]   8/14/2023 11:00 AM GERARD Riddle [facility] [place]   1/11/2024 11:30 AM EMG ENDO RN [facility] [place]   1/16/2024 11:00 AM Valerie Sierra MD [facility] [place]

## 2023-07-11 ENCOUNTER — TELEPHONE (OUTPATIENT)
Facility: CLINIC | Age: 78
End: 2023-07-11

## 2023-07-11 DIAGNOSIS — M54.50 LUMBAR BACK PAIN: ICD-10-CM

## 2023-07-11 DIAGNOSIS — M81.0 AGE-RELATED OSTEOPOROSIS WITHOUT CURRENT PATHOLOGICAL FRACTURE: Primary | ICD-10-CM

## 2023-07-11 DIAGNOSIS — M54.6 MIDLINE THORACIC BACK PAIN, UNSPECIFIED CHRONICITY: ICD-10-CM

## 2023-07-11 RX ORDER — GABAPENTIN 100 MG/1
100 CAPSULE ORAL 3 TIMES DAILY
Qty: 90 CAPSULE | Refills: 0 | Status: SHIPPED | OUTPATIENT
Start: 2023-07-11

## 2023-07-11 NOTE — TELEPHONE ENCOUNTER
Patient had 1st Prolia injection 7/6/2023. Will need calcium blood draw 7-10 days after. Pending calcium order and routed to Dr. Austin Schuster for review.

## 2023-07-11 NOTE — TELEPHONE ENCOUNTER
Medication: gabapentin 100 MG Oral Cap     Date of last refill: 06/13/23      Last office visit: 07/03/23  Due back to clinic per last office note:  na  Date next office visit scheduled:  08/14/23        Last OV note recommendation: Plan:   > Continue duloxetine at 30 mg every day: Refilled today with 2 refills added  > Follow-up in 6 weeks to evaluate use of duloxetine for both pain and mood  > Okay to continue Advil dual action as directed on bottle: Patient taking it approximately 5 times per day and gabapentin 100 mg twice daily  >use TENs twice per day   > see Dr. Pam Woodward for initial Prolia  > Continue calcium vitamin D supplementation  > Okay to continue all nonmedication management for thoracic pain  > If thoracic pain becomes unmanageable we will need to update imaging: Patient is not wanting to update at this point  No orders of the defined types were placed in this encounter.

## 2023-07-11 NOTE — TELEPHONE ENCOUNTER
Patient calling to request refill of   gabapentin 100 MG Oral [address], [address] [facility] [address] [place] [address] ([address]), [phone], [phone]       Patient informed of 48 hour refill policy excluding weekends and holidays. Informed patient prescription is sent directly to pharmacy. Further explained patient will not receive a call back once prescription is ready.

## 2023-07-13 ENCOUNTER — LABORATORY ENCOUNTER (OUTPATIENT)
Dept: LAB | Age: 78
End: 2023-07-13
Attending: FAMILY MEDICINE
Payer: MEDICARE

## 2023-07-13 DIAGNOSIS — E78.2 MIXED HYPERLIPIDEMIA: ICD-10-CM

## 2023-07-13 DIAGNOSIS — M80.80XA OTHER OSTEOPOROSIS WITH CURRENT PATHOLOGICAL FRACTURE, INITIAL ENCOUNTER: ICD-10-CM

## 2023-07-13 DIAGNOSIS — M81.0 AGE-RELATED OSTEOPOROSIS WITHOUT CURRENT PATHOLOGICAL FRACTURE: ICD-10-CM

## 2023-07-13 LAB
ALBUMIN SERPL-MCNC: 4 G/DL (ref 3.4–5)
ALBUMIN/GLOB SERPL: 1.2 {RATIO} (ref 1–2)
ALP LIVER SERPL-CCNC: 90 U/L
ALT SERPL-CCNC: 21 U/L
ANION GAP SERPL CALC-SCNC: 4 MMOL/L (ref 0–18)
AST SERPL-CCNC: 18 U/L (ref 15–37)
BILIRUB SERPL-MCNC: 0.5 MG/DL (ref 0.1–2)
BUN BLD-MCNC: 18 MG/DL (ref 7–18)
CALCIUM BLD-MCNC: 9.2 MG/DL (ref 8.5–10.1)
CHLORIDE SERPL-SCNC: 103 MMOL/L (ref 98–112)
CHOLEST SERPL-MCNC: 193 MG/DL (ref ?–200)
CO2 SERPL-SCNC: 31 MMOL/L (ref 21–32)
CREAT BLD-MCNC: 0.99 MG/DL
FASTING PATIENT LIPID ANSWER: YES
FASTING STATUS PATIENT QL REPORTED: YES
GFR SERPLBLD BASED ON 1.73 SQ M-ARVRAT: 59 ML/MIN/1.73M2 (ref 60–?)
GLOBULIN PLAS-MCNC: 3.4 G/DL (ref 2.8–4.4)
GLUCOSE BLD-MCNC: 92 MG/DL (ref 70–99)
HDLC SERPL-MCNC: 92 MG/DL (ref 40–59)
LDLC SERPL CALC-MCNC: 75 MG/DL (ref ?–100)
NONHDLC SERPL-MCNC: 101 MG/DL (ref ?–130)
OSMOLALITY SERPL CALC.SUM OF ELEC: 288 MOSM/KG (ref 275–295)
POTASSIUM SERPL-SCNC: 4.5 MMOL/L (ref 3.5–5.1)
PROT SERPL-MCNC: 7.4 G/DL (ref 6.4–8.2)
SODIUM SERPL-SCNC: 138 MMOL/L (ref 136–145)
TRIGL SERPL-MCNC: 155 MG/DL (ref 30–149)
VLDLC SERPL CALC-MCNC: 24 MG/DL (ref 0–30)

## 2023-07-13 PROCEDURE — 80061 LIPID PANEL: CPT

## 2023-07-13 PROCEDURE — 36415 COLL VENOUS BLD VENIPUNCTURE: CPT

## 2023-07-13 PROCEDURE — 80053 COMPREHEN METABOLIC PANEL: CPT

## 2023-07-17 ENCOUNTER — TELEPHONE (OUTPATIENT)
Facility: CLINIC | Age: 78
End: 2023-07-17

## 2023-07-17 NOTE — TELEPHONE ENCOUNTER
Dr. Rafaela Tellez response noted. No phone call back to patient needed at this time. Will close encounter.

## 2023-07-17 NOTE — TELEPHONE ENCOUNTER
Patient's last Polia injection was 7/6/23. CMP drawn on 7/13/23 (ordered by a different provider). Please see patient's calcium level. CMP results routed to Dr. Alicia Lane.    RN will route message to Dr. Alicia Lane. Kathrine Guardado

## 2023-07-17 NOTE — TELEPHONE ENCOUNTER
RN phoned patient to let her know that her calcium level (post Prolia) is fine, per Dr. Timoteo Warren. Patient would like to know if she should continue taking OTC calcium w/ vit D supplements. Takes one \"630mg/12. 5mcg\" tablet BID and has been on that dose since 8/22/22, pt reported. RN will call patient back if any changes are ordered.      Message routed to Dr. Tmioteo Warren.

## 2023-08-03 ENCOUNTER — TELEPHONE (OUTPATIENT)
Dept: PAIN CLINIC | Facility: CLINIC | Age: 78
End: 2023-08-03

## 2023-08-03 NOTE — TELEPHONE ENCOUNTER
Duloxetine 30 mg capsules was sent to pharmacy on 07/03/23 for a quantity of 30 with 2 refills. Spoke to patient and informed her of refills on file at pharmacy and to contact pharmacy to initiate the refills. Patient verbalized understanding.

## 2023-08-03 NOTE — TELEPHONE ENCOUNTER
Patient calling to request refill of   DULoxetine 30 MG Oral Cap DR [address] [place] [address] ([address]), [phone], [phone]     Patient informed of 48 hour refill policy excluding weekends and holidays. Informed patient prescription is sent directly to pharmacy. Further explained patient will not receive a call back once prescription is ready.

## 2023-08-11 DIAGNOSIS — M54.50 LUMBAR BACK PAIN: ICD-10-CM

## 2023-08-11 DIAGNOSIS — M54.6 MIDLINE THORACIC BACK PAIN, UNSPECIFIED CHRONICITY: ICD-10-CM

## 2023-08-11 RX ORDER — GABAPENTIN 100 MG/1
100 CAPSULE ORAL 3 TIMES DAILY
Qty: 90 CAPSULE | Refills: 0 | Status: SHIPPED | OUTPATIENT
Start: 2023-08-11

## 2023-08-11 NOTE — TELEPHONE ENCOUNTER
Patient calling to request refill of   gabapentin 100 MG Oral [address] Joshua Cooper [address] [place] [address] ([address]), [phone], [phone]     Patient informed of 48 hour refill policy excluding weekends and holidays. Informed patient prescription is sent directly to pharmacy. Further explained patient will not receive a call back once prescription is ready.

## 2023-08-11 NOTE — TELEPHONE ENCOUNTER
Medication: gabapentin 100 MG     Date of last refill: 07/11/23      Last office visit: 07/03/23 virtual  Due back to clinic per last office note:  na  Date next office visit scheduled:  08/15/23      Last OV note recommendation: Plan:   > Continue duloxetine at 30 mg every day: Refilled today with 2 refills added  > Follow-up in 6 weeks to evaluate use of duloxetine for both pain and mood  > Okay to continue Advil dual action as directed on bottle: Patient taking it approximately 5 times per day and gabapentin 100 mg twice daily  >use TENs twice per day   > see Dr. Jasmina Esposito for initial Prolia  > Continue calcium vitamin D supplementation  > Okay to continue all nonmedication management for thoracic pain  > If thoracic pain becomes unmanageable we will need to update imaging: Patient is not wanting to update at this point  No orders of the defined types were placed in this encounter.

## 2023-08-15 ENCOUNTER — VIRTUAL PHONE E/M (OUTPATIENT)
Dept: PAIN CLINIC | Facility: CLINIC | Age: 78
End: 2023-08-15
Payer: MEDICARE

## 2023-08-15 DIAGNOSIS — M54.6 MIDLINE THORACIC BACK PAIN, UNSPECIFIED CHRONICITY: ICD-10-CM

## 2023-08-15 DIAGNOSIS — G89.29 OTHER CHRONIC PAIN: Primary | ICD-10-CM

## 2023-08-15 DIAGNOSIS — F43.21 SITUATIONAL DEPRESSION: ICD-10-CM

## 2023-08-15 PROCEDURE — 99214 OFFICE O/P EST MOD 30 MIN: CPT | Performed by: NURSE PRACTITIONER

## 2023-08-25 DIAGNOSIS — M35.3 POLYMYALGIA RHEUMATICA (HCC): ICD-10-CM

## 2023-08-25 RX ORDER — PREDNISONE 5 MG/1
5 TABLET ORAL DAILY
Qty: 90 TABLET | Refills: 0 | Status: SHIPPED | OUTPATIENT
Start: 2023-08-25 | End: 2023-11-23

## 2023-08-25 NOTE — TELEPHONE ENCOUNTER
Last office visit: 11/29/22  Last refill: 5/23/23  Labs Due: 1/19/24  Future Appointments   Date Time Provider Vahe Licha   10/9/2023 10:00 AM GERARD Giang [facility]   1/11/2024 11:30 AM [facility] RN [facility]   1/16/2024 11:00 AM Sam Osman MD [facility]

## 2023-09-13 DIAGNOSIS — M54.50 LUMBAR BACK PAIN: ICD-10-CM

## 2023-09-13 DIAGNOSIS — M54.6 MIDLINE THORACIC BACK PAIN, UNSPECIFIED CHRONICITY: ICD-10-CM

## 2023-09-13 RX ORDER — GABAPENTIN 100 MG/1
100 CAPSULE ORAL 3 TIMES DAILY
Qty: 90 CAPSULE | Refills: 0 | Status: SHIPPED | OUTPATIENT
Start: 2023-09-13

## 2023-10-04 DIAGNOSIS — M80.00XG AGE-RELATED OSTEOPOROSIS WITH CURRENT PATHOLOGICAL FRACTURE WITH DELAYED HEALING, SUBSEQUENT ENCOUNTER: ICD-10-CM

## 2023-10-04 DIAGNOSIS — M54.6 MIDLINE THORACIC BACK PAIN, UNSPECIFIED CHRONICITY: ICD-10-CM

## 2023-10-04 DIAGNOSIS — M54.50 LUMBAR BACK PAIN: Primary | ICD-10-CM

## 2023-10-04 RX ORDER — DULOXETIN HYDROCHLORIDE 30 MG/1
30 CAPSULE, DELAYED RELEASE ORAL DAILY
Qty: 30 CAPSULE | Refills: 2 | Status: SHIPPED | OUTPATIENT
Start: 2023-10-04

## 2023-10-04 NOTE — TELEPHONE ENCOUNTER
Medication: DULoxetine 30 MG     Date of last refill: 07/03/23      Last office visit: 08/15/23 virtual  Due back to clinic per last office note:   6 weeks  Date next office visit scheduled:  10/09/23        Last OV note recommendation:   Plan:   > Continue duloxetine at 30 mg every day  > Follow-up in 6 weeks to evaluate use of duloxetine for both pain and mood  > Okay to continue Advil dual action as directed on bottle: Patient taking it approximately 5 times per day and gabapentin 100 mg twice daily  >use TENs twice per day   > Continue calcium vitamin D supplementation  > Okay to continue all nonmedication management for thoracic pain  > If thoracic pain becomes unmanageable we will need to update imaging: Patient is not wanting to update at this point  >if reflux progresses: d/w PCP re: use of PPI: did recommend however patient declined as she has only had 2 episodes: she was reminded that her meds/including advil/going to bed on full stomach/eating greasy/acidic foods/can cause these symptoms  No orders of the defined types were placed in this encounter.

## 2023-10-04 NOTE — TELEPHONE ENCOUNTER
Patient calling to request refill of DULoxetine 30 MG Oral Cap DR [address] [place] [address] ([address]), [phone], [phone]     Patient informed of 48 hour refill policy excluding weekends and holidays. Informed patient prescription is sent directly to pharmacy. Further explained patient will not receive a call back once prescription is ready.

## 2023-10-06 ENCOUNTER — TELEPHONE (OUTPATIENT)
Dept: FAMILY MEDICINE CLINIC | Facility: CLINIC | Age: 78
End: 2023-10-06

## 2023-10-09 ENCOUNTER — VIRTUAL PHONE E/M (OUTPATIENT)
Dept: PAIN CLINIC | Facility: CLINIC | Age: 78
End: 2023-10-09
Payer: MEDICARE

## 2023-10-09 DIAGNOSIS — M80.00XG AGE-RELATED OSTEOPOROSIS WITH CURRENT PATHOLOGICAL FRACTURE WITH DELAYED HEALING, SUBSEQUENT ENCOUNTER: ICD-10-CM

## 2023-10-09 DIAGNOSIS — M54.6 MIDLINE THORACIC BACK PAIN, UNSPECIFIED CHRONICITY: Primary | ICD-10-CM

## 2023-10-09 DIAGNOSIS — M80.00XA PATHOLOGICAL FRACTURE OF OTHER SITE DUE TO OSTEOPOROSIS, UNSPECIFIED OSTEOPOROSIS TYPE, INITIAL ENCOUNTER: ICD-10-CM

## 2023-10-09 DIAGNOSIS — F43.21 SITUATIONAL DEPRESSION: ICD-10-CM

## 2023-10-09 DIAGNOSIS — F11.10 OPIOID ABUSE, CONTINUOUS USE (HCC): ICD-10-CM

## 2023-10-10 ENCOUNTER — TELEPHONE (OUTPATIENT)
Dept: FAMILY MEDICINE CLINIC | Facility: CLINIC | Age: 78
End: 2023-10-10

## 2023-10-10 NOTE — TELEPHONE ENCOUNTER
Per patient started with pain lower abdomen pain on Left and Right side  4 days ago then  Left resolved the next day then just on Right side eased up but this morning is worse and pain is in lower abdomen. No diarrhea, fever, or vomiting denies any urinary issues. Patient feels she needs to be evaluated this morning so will proceed to urgent care.

## 2023-10-24 DIAGNOSIS — M54.50 LUMBAR BACK PAIN: ICD-10-CM

## 2023-10-24 DIAGNOSIS — M54.6 MIDLINE THORACIC BACK PAIN, UNSPECIFIED CHRONICITY: ICD-10-CM

## 2023-10-24 RX ORDER — GABAPENTIN 100 MG/1
100 CAPSULE ORAL 3 TIMES DAILY
Qty: 90 CAPSULE | Refills: 0 | Status: SHIPPED | OUTPATIENT
Start: 2023-10-24

## 2023-10-24 NOTE — TELEPHONE ENCOUNTER
Patient calling to request refill of gabapentin 100 MG Oral [address] Kasi Syed [address] [place] [address] ([address]), [phone], [phone] [[id number]]     Patient informed of 48 hour refill policy excluding weekends and holidays. Informed patient prescription is sent directly to pharmacy. Further explained patient will not receive a call back once prescription is ready.

## 2023-10-24 NOTE — TELEPHONE ENCOUNTER
Medication: gabapentin 100 MG     Date of last refill: 09/13/23      Last office visit: 10/09/23 telemedicine  Due back to clinic per last office note:  3 months  Date next office visit scheduled:  01/08/24        Last OV note recommendation:   Plan:   > Continue duloxetine at 30 mg every day  > Follow-up in 12 weeks to evaluate use of duloxetine for both pain and mood  > Okay to continue Advil dual action as directed on bottle: Patient taking it approximately 5 times per day and gabapentin 100 mg twice daily  >use TENs twice per day   > Continue calcium vitamin D supplementation with reduced dose per oncology: 2/2 breast cancer  > Okay to continue all nonmedication management for thoracic pain  > If thoracic pain becomes unmanageable we will need to update imaging: Patient is not wanting to update at this point  >call for med refills  No orders of the defined types were placed in this encounter.

## 2023-11-06 ENCOUNTER — LABORATORY ENCOUNTER (OUTPATIENT)
Dept: LAB | Age: 78
End: 2023-11-06
Attending: FAMILY MEDICINE
Payer: MEDICARE

## 2023-11-06 ENCOUNTER — OFFICE VISIT (OUTPATIENT)
Dept: FAMILY MEDICINE CLINIC | Facility: CLINIC | Age: 78
End: 2023-11-06
Payer: MEDICARE

## 2023-11-06 VITALS
RESPIRATION RATE: 12 BRPM | BODY MASS INDEX: 25.63 KG/M2 | HEART RATE: 116 BPM | HEIGHT: 60.25 IN | SYSTOLIC BLOOD PRESSURE: 130 MMHG | TEMPERATURE: 98 F | WEIGHT: 132.25 LBS | OXYGEN SATURATION: 96 % | DIASTOLIC BLOOD PRESSURE: 72 MMHG

## 2023-11-06 DIAGNOSIS — Z09 STATUS POST APPENDECTOMY, FOLLOW-UP EXAM: ICD-10-CM

## 2023-11-06 DIAGNOSIS — K35.201 ACUTE APPENDICITIS WITH PERFORATION AND GENERALIZED PERITONITIS, WITHOUT ABSCESS OR GANGRENE: Primary | ICD-10-CM

## 2023-11-06 DIAGNOSIS — M35.3 POLYMYALGIA RHEUMATICA (HCC): ICD-10-CM

## 2023-11-06 DIAGNOSIS — K35.201 ACUTE APPENDICITIS WITH PERFORATION AND GENERALIZED PERITONITIS, WITHOUT ABSCESS OR GANGRENE: ICD-10-CM

## 2023-11-06 DIAGNOSIS — D72.825 BANDEMIA: ICD-10-CM

## 2023-11-06 DIAGNOSIS — E87.6 HYPOKALEMIA: ICD-10-CM

## 2023-11-06 DIAGNOSIS — B37.31 CANDIDA VAGINITIS: ICD-10-CM

## 2023-11-06 LAB
ALBUMIN SERPL-MCNC: 4 G/DL (ref 3.4–5)
ALBUMIN/GLOB SERPL: 1 {RATIO} (ref 1–2)
ALP LIVER SERPL-CCNC: 109 U/L
ALT SERPL-CCNC: 27 U/L
ANION GAP SERPL CALC-SCNC: 7 MMOL/L (ref 0–18)
AST SERPL-CCNC: 20 U/L (ref 15–37)
BASOPHILS # BLD AUTO: 0.08 X10(3) UL (ref 0–0.2)
BASOPHILS NFR BLD AUTO: 0.6 %
BILIRUB SERPL-MCNC: 0.6 MG/DL (ref 0.1–2)
BUN BLD-MCNC: 14 MG/DL (ref 9–23)
CALCIUM BLD-MCNC: 9.9 MG/DL (ref 8.5–10.1)
CHLORIDE SERPL-SCNC: 103 MMOL/L (ref 98–112)
CO2 SERPL-SCNC: 28 MMOL/L (ref 21–32)
CREAT BLD-MCNC: 1.16 MG/DL
EGFRCR SERPLBLD CKD-EPI 2021: 49 ML/MIN/1.73M2 (ref 60–?)
EOSINOPHIL # BLD AUTO: 0.07 X10(3) UL (ref 0–0.7)
EOSINOPHIL NFR BLD AUTO: 0.5 %
ERYTHROCYTE [DISTWIDTH] IN BLOOD BY AUTOMATED COUNT: 15.9 %
FASTING STATUS PATIENT QL REPORTED: NO
GLOBULIN PLAS-MCNC: 4 G/DL (ref 2.8–4.4)
GLUCOSE BLD-MCNC: 119 MG/DL (ref 70–99)
HCT VFR BLD AUTO: 46.1 %
HGB BLD-MCNC: 13.7 G/DL
IMM GRANULOCYTES # BLD AUTO: 0.11 X10(3) UL (ref 0–1)
IMM GRANULOCYTES NFR BLD: 0.8 %
LYMPHOCYTES # BLD AUTO: 1.47 X10(3) UL (ref 1–4)
LYMPHOCYTES NFR BLD AUTO: 10.6 %
MCH RBC QN AUTO: 26.9 PG (ref 26–34)
MCHC RBC AUTO-ENTMCNC: 29.7 G/DL (ref 31–37)
MCV RBC AUTO: 90.6 FL
MONOCYTES # BLD AUTO: 0.62 X10(3) UL (ref 0.1–1)
MONOCYTES NFR BLD AUTO: 4.5 %
NEUTROPHILS # BLD AUTO: 11.47 X10 (3) UL (ref 1.5–7.7)
NEUTROPHILS # BLD AUTO: 11.47 X10(3) UL (ref 1.5–7.7)
NEUTROPHILS NFR BLD AUTO: 83 %
OSMOLALITY SERPL CALC.SUM OF ELEC: 288 MOSM/KG (ref 275–295)
PLATELET # BLD AUTO: 354 10(3)UL (ref 150–450)
POTASSIUM SERPL-SCNC: 5.3 MMOL/L (ref 3.5–5.1)
PROT SERPL-MCNC: 8 G/DL (ref 6.4–8.2)
RBC # BLD AUTO: 5.09 X10(6)UL
SODIUM SERPL-SCNC: 138 MMOL/L (ref 136–145)
WBC # BLD AUTO: 13.8 X10(3) UL (ref 4–11)

## 2023-11-06 PROCEDURE — 99214 OFFICE O/P EST MOD 30 MIN: CPT | Performed by: FAMILY MEDICINE

## 2023-11-06 PROCEDURE — 85025 COMPLETE CBC W/AUTO DIFF WBC: CPT

## 2023-11-06 PROCEDURE — 80053 COMPREHEN METABOLIC PANEL: CPT

## 2023-11-06 PROCEDURE — 36415 COLL VENOUS BLD VENIPUNCTURE: CPT

## 2023-11-06 RX ORDER — FLUCONAZOLE 150 MG/1
150 TABLET ORAL ONCE
Qty: 1 TABLET | Refills: 0 | Status: SHIPPED | OUTPATIENT
Start: 2023-11-06 | End: 2023-11-06

## 2023-11-06 RX ORDER — METOPROLOL SUCCINATE 25 MG/1
12.5 TABLET, EXTENDED RELEASE ORAL DAILY
COMMUNITY
Start: 2023-10-19 | End: 2023-11-06

## 2023-11-06 RX ORDER — CALCIUM CARBONATE/VITAMIN D3 500MG-5MCG
TABLET ORAL
COMMUNITY

## 2023-11-06 RX ORDER — PREDNISONE 5 MG/1
2.5 TABLET ORAL DAILY
COMMUNITY
Start: 2023-11-06

## 2023-11-08 ENCOUNTER — TELEPHONE (OUTPATIENT)
Dept: FAMILY MEDICINE CLINIC | Facility: CLINIC | Age: 78
End: 2023-11-08

## 2023-11-08 DIAGNOSIS — B37.31 CANDIDA VAGINITIS: Primary | ICD-10-CM

## 2023-11-08 RX ORDER — FLUCONAZOLE 150 MG/1
150 TABLET ORAL DAILY
Qty: 2 TABLET | Refills: 0 | Status: SHIPPED | OUTPATIENT
Start: 2023-11-08 | End: 2023-11-10

## 2023-11-08 NOTE — TELEPHONE ENCOUNTER
Patient calling to clarify dose on fluconazole thought she was to get 2 doses per discussion at office visit on 11-6-23. Per patient only received one dose.     Please advise

## 2023-11-09 ENCOUNTER — TELEPHONE (OUTPATIENT)
Dept: PAIN CLINIC | Facility: CLINIC | Age: 78
End: 2023-11-09

## 2023-11-09 NOTE — TELEPHONE ENCOUNTER
Patient calling to request refill of DULoxetine 30 MG Oral Cap DR [address] [place] [address] ([address]), [phone], [phone] [[id number]]     Patient informed of 48 hour refill policy excluding weekends and holidays. Informed patient prescription is sent directly to pharmacy. Further explained patient will not receive a call back once prescription is ready.

## 2023-11-22 ENCOUNTER — IMMUNIZATION (OUTPATIENT)
Dept: FAMILY MEDICINE CLINIC | Facility: CLINIC | Age: 78
End: 2023-11-22
Payer: MEDICARE

## 2023-11-22 DIAGNOSIS — Z23 NEED FOR VACCINATION: Primary | ICD-10-CM

## 2023-11-22 PROCEDURE — G0008 ADMIN INFLUENZA VIRUS VAC: HCPCS | Performed by: FAMILY MEDICINE

## 2023-11-22 PROCEDURE — 90662 IIV NO PRSV INCREASED AG IM: CPT | Performed by: FAMILY MEDICINE

## 2023-11-24 DIAGNOSIS — M54.6 MIDLINE THORACIC BACK PAIN, UNSPECIFIED CHRONICITY: ICD-10-CM

## 2023-11-24 DIAGNOSIS — M54.50 LUMBAR BACK PAIN: ICD-10-CM

## 2023-11-24 RX ORDER — GABAPENTIN 100 MG/1
100 CAPSULE ORAL 3 TIMES DAILY
Qty: 90 CAPSULE | Refills: 0 | Status: SHIPPED | OUTPATIENT
Start: 2023-11-24

## 2023-11-24 NOTE — TELEPHONE ENCOUNTER
Medication:   gabapentin 100 MG Oral Cap     Date of last refill: 10/24/23    Last office visit: 10/9/23  Due back to clinic per last office note: Follow-up in 12 weeks to evaluate use of duloxetine for both pain and mood  Date next office visit scheduled:  1/8/24    Last OV note recommendation:   Medical Decision Making:   Diagnosis:    Midline thoracic back pain, unspecified chronicity  (primary encounter diagnosis)  Age-related osteoporosis with current pathological fracture with delayed healing, subsequent encounter  Pathological fracture of other site due to osteoporosis, unspecified osteoporosis type, initial encounter  Situational depression  Opioid abuse, continuous use ([facility])  Impression:   Isabel Hanson presents with complaints of thoracic pain just below the bra line. Patient states that the pain had been interfering with her quality of life, ability to do activities of daily living. However she feels that it is starting to get a bit more manageable with the addition of duloxetine 30 mg daily. Patient's  helps with application of pain patches, topical creams and helps her apply a TENS unit BID which is helping her pain. Injection therapy did not resolve her symptoms. She continues using Advil dual action 5 times a day along with gabapentin 100 mg twice daily. She uses melatonin 3 mg at nighttime. She takes her duloxetine in the morning and is denying any side effects from this medication and feels it does provide her pain relief improved mood and energy during the day. She has noticed a reduction in her frustration tolerance. Patient has been on opioids in the past and does not want to use opioid medications for her pain as she had difficulty weaning off of these. Patient denies any radiating quality to the pain. Patient is following up to understand any additional options.   Patient does use a soft brace if she does need to do things such as the dishes or cooking or standing at a counter. Patient does state that the pain that she is having is being modulated with her treatment. She she feels that her pain is manageable with her current treatments. .         Total visit time: 20 minutes  More than 50% spent coordinating care, providing patient education, reviewing imaging, discussing conservative vs surgical treatment options and counseling. Plan:   > Continue duloxetine at 30 mg every day  > Follow-up in 12 weeks to evaluate use of duloxetine for both pain and mood  > Okay to continue Advil dual action as directed on bottle: Patient taking it approximately 5 times per day and gabapentin 100 mg twice daily  >use TENs twice per day   > Continue calcium vitamin D supplementation with reduced dose per oncology: 2/2 breast cancer  > Okay to continue all nonmedication management for thoracic pain  > If thoracic pain becomes unmanageable we will need to update imaging: Patient is not wanting to update at this point  >call for med refills  No orders of the defined types were placed in this encounter. Meds & Refills for this Visit:  Requested Prescriptions        No prescriptions requested or ordered in this encounter         Imaging & Consults:  None     The patient indicates understanding of these issues and agrees to the plan.         [id number]

## 2023-11-24 NOTE — TELEPHONE ENCOUNTER
Patient calling to request refill of gabapentin 100 MG Oral [address] Joshua Cooper [address] [place] [address] ([address]), [phone], [phone] [[id number]]     Patient informed of 48 hour refill policy excluding weekends and holidays. Informed patient prescription is sent directly to pharmacy. Further explained patient will not receive a call back once prescription is ready.

## 2023-12-04 ENCOUNTER — TELEPHONE (OUTPATIENT)
Facility: CLINIC | Age: 78
End: 2023-12-04

## 2023-12-04 NOTE — TELEPHONE ENCOUNTER
Patient scheduled for Nurse visit for Prolia on 01/11/2024. Checked box for benefits reverification. Faxed to [employer] at [phone]. Will await fax confirmation.

## 2023-12-21 RX ORDER — SIMVASTATIN 10 MG
10 TABLET ORAL NIGHTLY
Qty: 90 TABLET | Refills: 3 | Status: SHIPPED | OUTPATIENT
Start: 2023-12-21

## 2023-12-21 RX ORDER — PREDNISONE 2.5 MG/1
2.5 TABLET ORAL DAILY
Qty: 30 TABLET | Refills: 2 | Status: SHIPPED | OUTPATIENT
Start: 2023-12-21 | End: 2024-03-20

## 2023-12-21 NOTE — TELEPHONE ENCOUNTER
predniSONE --Pt wanted to know if there is a 2.5 mg tab instead of 5 mg.   She said she is currently cutting them in half & it is hard to do.    simvastatin 10 MG Oral Tab   [facility]

## 2023-12-21 NOTE — TELEPHONE ENCOUNTER
Last office visit: 11/6/23  Last refill: 11/29/22  Labs Due: 1/19/24  Future Appointments   Date Time Provider Vahe Licha   1/8/2024 10:00 AM GERARD Pope [facility]   1/11/2024 11:30 AM [facility] END[facility] RN [facility]   1/16/2024 10:30 AM Taz Soto MD [facility]

## 2023-12-27 DIAGNOSIS — M54.6 MIDLINE THORACIC BACK PAIN, UNSPECIFIED CHRONICITY: ICD-10-CM

## 2023-12-27 DIAGNOSIS — M54.50 LUMBAR BACK PAIN: ICD-10-CM

## 2023-12-27 RX ORDER — GABAPENTIN 100 MG/1
100 CAPSULE ORAL 3 TIMES DAILY
Qty: 90 CAPSULE | Refills: 0 | Status: SHIPPED | OUTPATIENT
Start: 2023-12-27

## 2023-12-27 NOTE — TELEPHONE ENCOUNTER
Patient calling to request refill of gabapentin 100 MG Oral [address] ([address]), [phone], [phone]       Patient informed of 48 hour refill policy excluding weekends and holidays. Informed patient prescription is sent directly to pharmacy. Further explained patient will not receive a call back once prescription is ready.

## 2023-12-27 NOTE — TELEPHONE ENCOUNTER
Medication: gabapentin 100 MG     Date of last refill: 11/24/23      Last office visit: 1009/23  Due back to clinic per last office note:  3 months  Date next office visit scheduled:  01/08/23        Last OV note recommendation:   Plan:   > Continue duloxetine at 30 mg every day  > Follow-up in 12 weeks to evaluate use of duloxetine for both pain and mood  > Okay to continue Advil dual action as directed on bottle: Patient taking it approximately 5 times per day and gabapentin 100 mg twice daily  >use TENs twice per day   > Continue calcium vitamin D supplementation with reduced dose per oncology: 2/2 breast cancer  > Okay to continue all nonmedication management for thoracic pain  > If thoracic pain becomes unmanageable we will need to update imaging: Patient is not wanting to update at this point  >call for med refills  No orders of the defined types were placed in this encounter.

## 2024-01-08 ENCOUNTER — VIRTUAL PHONE E/M (OUTPATIENT)
Dept: PAIN CLINIC | Facility: CLINIC | Age: 79
End: 2024-01-08
Payer: MEDICARE

## 2024-01-08 DIAGNOSIS — M80.00XG AGE-RELATED OSTEOPOROSIS WITH CURRENT PATHOLOGICAL FRACTURE WITH DELAYED HEALING, SUBSEQUENT ENCOUNTER: ICD-10-CM

## 2024-01-08 DIAGNOSIS — M80.00XA PATHOLOGICAL FRACTURE OF OTHER SITE DUE TO OSTEOPOROSIS, UNSPECIFIED OSTEOPOROSIS TYPE, INITIAL ENCOUNTER: ICD-10-CM

## 2024-01-08 DIAGNOSIS — M54.50 LUMBAR BACK PAIN: Primary | ICD-10-CM

## 2024-01-08 DIAGNOSIS — G89.29 OTHER CHRONIC PAIN: ICD-10-CM

## 2024-01-08 DIAGNOSIS — M54.6 MIDLINE THORACIC BACK PAIN, UNSPECIFIED CHRONICITY: ICD-10-CM

## 2024-01-08 PROCEDURE — 99213 OFFICE O/P EST LOW 20 MIN: CPT | Performed by: NURSE PRACTITIONER

## 2024-01-08 RX ORDER — DULOXETIN HYDROCHLORIDE 30 MG/1
30 CAPSULE, DELAYED RELEASE ORAL DAILY
Qty: 30 CAPSULE | Refills: 2 | Status: SHIPPED | OUTPATIENT
Start: 2024-01-08

## 2024-01-08 NOTE — PROGRESS NOTES
HPI:   Pat Roach presents for f/u: she was last seen telephone visit on October 9, 2023    She has been increased cymbalta to 30 mg daily to help with her pain and her mood/she feels this is helpful.  She is on prolia for her OP.  Since last visit: she had appendicitis/had lap appey/recovered well.       Patient presents with complaints of thoracic pain just below the bra line. Patient stated that the pain is manageable with all of her current treatment.  She is able to do activities of daily living.      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 No falls/injuries    Patient continues using Advil dual action, gabapentin 100 mg 3 times daily along with the duloxetine 30 mg in the morning as she feels it does give her energy.    Additional nonprescription treatments include application of pain patches, topical creams and  TENS unit which is helping her pain.  Injection therapy did not resolve her symptoms.    Patient had been on opioids in the past and does not want to use opioid medications for her pain as she had difficulty weaning off of these.  Patient denies any radiating quality to the pain.    Patient does use a soft brace as needed if she does need to do things such as the dishes or cooking or standing at a counter.      The pain is described as mild aching, grabbing, soreness that is constant .  The patient’s activity level has increased since last visit.  The pain is worst in the early morning.  And related to activities    The following activities will increase the patient’s pain: Standing walking grocery shopping doing the dishes cooking standing at a counter. Standing too long, frying caro but she feels it is doable/manageable.    The following activities decrease the patient’s pain: medications, limiting activity level, changing position, Wearing a brace using a TENS unit    Functional Assessment: Patient reports that they are able to complete all of their ADL's such as eating, bathing, using the  toilet, dressing and getting up from a bed or a chair independently.     Patient's  does help her at times    Current Medications:  Current Outpatient Medications   Medication Sig Dispense Refill    gabapentin 100 MG Oral Cap Take 1 capsule (100 mg total) by mouth 3 (three) times daily. 90 capsule 0    simvastatin 10 MG Oral Tab Take 1 tablet (10 mg total) by mouth nightly. 90 tablet 3    predniSONE 2.5 MG Oral Tab Take 1 tablet (2.5 mg total) by mouth daily. 30 tablet 2    Calcium Carb-Cholecalciferol (OS-ADITHYA CALCIUM + D3) 500-5 MG-MCG Oral Tab Take by mouth.      denosumab 60 MG/ML Subcutaneous Solution Prefilled Syringe Inject 1 mL (60 mg total) into the skin one time.      Calcium Carbonate-Vitamin D (CALCIUM + D OR) Take 1 tablet by mouth 2 (two) times daily with meals.      predniSONE 5 MG Oral Tab Take 0.5 tablets (2.5 mg total) by mouth daily.      DULoxetine 30 MG Oral Cap DR Particles Take 1 capsule (30 mg total) by mouth daily. 30 capsule 2    Compound Medication Apply 1 g topically 4 (four) times daily as needed. Compound Cream: Diclofenac Sodium 3%, Cyclobenzaprine HCL 2%, Lidocaine HCL 2% (15 g tube) 1 each 0    melatonin 3 MG Oral Tab Take 1 tablet (3 mg total) by mouth nightly.        Patient requires assistance with: No assistance required  Have you recently had any feelings of harming yourself or others? The patient's response was no.  However patient reports that her mood has been affected by general pain complaints    Physical Exam:   There were no vitals taken for this visit. phone visit  VAS Pain Score: 1/10 manageable  Radiology/Lab Test Reviewed:   Last MRI in the system reviewed from 2022 September  Lab Results   Component Value Date    WBC 13.8 (H) 11/06/2023    WBC 14.5 (H) 02/15/2021    WBC 16.8 (H) 09/17/2020   No results found for: \"HEMOGLOBIN\"  Lab Results   Component Value Date    .0 11/06/2023    .0 02/15/2021    .0 09/17/2020     Patient Education:  Patient was advised to continue normal activities as tolerated and was advised against any form of bedrest, since recent guidelines promote and encourage physical activity for improvment of functionality and overall pain.  (Family Practice Vol. 16, No. 1, 86-52Â© [employer] 1999 ).  Patient educated and verbalized understanding.  Medical Decision Making:   Diagnosis:    Encounter Diagnoses   Name Primary?    Lumbar back pain Yes    Pathological fracture of other site due to osteoporosis, unspecified osteoporosis type, initial encounter     Other chronic pain      Impression:   Pat Roach presents with complaints of thoracic pain just below the bra line.  Patient states that the pain has been manageable with her current regimen of gabapentin 100 mg 3 times daily and duloxetine 30 mg in the morning.  Patient states that there are still some activities that aggravate her symptoms but she feels that topical medications have been helpful.  Since her last visit she was hospitalized for acute appendicitis that required surgery. .        Patient had been on opioids in the past and does not want to use opioid medications for her pain as she had difficulty weaning off of these.  Patient denies any radiating quality to the pain.      Patient does state that the pain that she is having is being modulated with her treatment.  She she feels that her pain is manageable with her current treatments..       Total visit time: 20 minutes  More than 50% spent coordinating care, providing patient education, reviewing imaging, discussing conservative vs surgical treatment options and counseling.         Plan:   > Continue duloxetine at 30 mg every day: refilled today  > Follow-up every 6 months  > Okay to continue Advil dual action as directed on bottle: Patient taking it approximately 5 times per day and gabapentin 100 mg twice daily  >use TENs twice per day   > Continue calcium vitamin D supplementation with reduced dose  per oncology: 2/2 breast cancer  > Okay to continue all nonmedication management for thoracic pain  > If thoracic pain becomes unmanageable we will need to update imaging: Patient is not wanting to update at this point  >call for med refills  No orders of the defined types were placed in this encounter.      Meds & Refills for this Visit:  Requested Prescriptions      No prescriptions requested or ordered in this encounter       Imaging & Consults:  None    The patient indicates understanding of these issues and agrees to the plan.      ID#[id number]

## 2024-01-11 ENCOUNTER — NURSE ONLY (OUTPATIENT)
Facility: CLINIC | Age: 79
End: 2024-01-11
Payer: MEDICARE

## 2024-01-11 ENCOUNTER — LAB ENCOUNTER (OUTPATIENT)
Dept: LAB | Age: 79
End: 2024-01-11
Attending: STUDENT IN AN ORGANIZED HEALTH CARE EDUCATION/TRAINING PROGRAM
Payer: MEDICARE

## 2024-01-11 DIAGNOSIS — M81.0 AGE-RELATED OSTEOPOROSIS WITHOUT CURRENT PATHOLOGICAL FRACTURE: ICD-10-CM

## 2024-01-11 DIAGNOSIS — M81.0 AGE-RELATED OSTEOPOROSIS WITHOUT CURRENT PATHOLOGICAL FRACTURE: Primary | ICD-10-CM

## 2024-01-11 LAB
ANION GAP SERPL CALC-SCNC: 3 MMOL/L (ref 0–18)
BUN BLD-MCNC: 17 MG/DL (ref 9–23)
CALCIUM BLD-MCNC: 9.4 MG/DL (ref 8.5–10.1)
CHLORIDE SERPL-SCNC: 106 MMOL/L (ref 98–112)
CO2 SERPL-SCNC: 30 MMOL/L (ref 21–32)
CREAT BLD-MCNC: 0.94 MG/DL
EGFRCR SERPLBLD CKD-EPI 2021: 62 ML/MIN/1.73M2 (ref 60–?)
FASTING STATUS PATIENT QL REPORTED: NO
GLUCOSE BLD-MCNC: 103 MG/DL (ref 70–99)
OSMOLALITY SERPL CALC.SUM OF ELEC: 290 MOSM/KG (ref 275–295)
POTASSIUM SERPL-SCNC: 5 MMOL/L (ref 3.5–5.1)
SODIUM SERPL-SCNC: 139 MMOL/L (ref 136–145)
VIT D+METAB SERPL-MCNC: 30.3 NG/ML (ref 30–100)

## 2024-01-11 PROCEDURE — 80048 BASIC METABOLIC PNL TOTAL CA: CPT

## 2024-01-11 PROCEDURE — 36415 COLL VENOUS BLD VENIPUNCTURE: CPT

## 2024-01-11 PROCEDURE — 82306 VITAMIN D 25 HYDROXY: CPT

## 2024-01-16 ENCOUNTER — VIRTUAL PHONE E/M (OUTPATIENT)
Facility: CLINIC | Age: 79
End: 2024-01-16
Payer: MEDICARE

## 2024-01-16 DIAGNOSIS — M81.0 AGE-RELATED OSTEOPOROSIS WITHOUT CURRENT PATHOLOGICAL FRACTURE: Primary | ICD-10-CM

## 2024-01-16 DIAGNOSIS — E04.2 MULTINODULAR GOITER: ICD-10-CM

## 2024-01-16 PROCEDURE — 99443 PHONE E/M BY PHYS 21-30 MIN: CPT | Performed by: STUDENT IN AN ORGANIZED HEALTH CARE EDUCATION/TRAINING PROGRAM

## 2024-01-16 NOTE — PROGRESS NOTES
Endocrinology Clinic Note    Name: Pat Roach  Date: 1/16/2024      HISTORY OF PRESENT ILLNESS   Pat Roach is a 78 year old female with PMHx significant for chronic back pain, hx of mult vertebral compression fx, osteoporosis, PMR, hx of breast CA who presents for endocrine consultation for osteoporosis    Initial HPI consult in June 2023  Previously with Dr Galan (Clive penny) for eval of mult vertebral compression fx (T7, T8, T11, T12) s/p kyphoplasty.  Completed  one yr of Tymlos (11/2021 - 9/2022), then due to insurance change, was planning to switch to Prolia afterwards, but has not done so yet.      Pt continues to follow up with pain management for back pain  Takes combo Ca+Vit D two tablets a day. Was told by her oncologist recently that her Ca is too high and needs to lower Ca.    Pt endorses Hx of chronic steroids >3 months, Hx of treatment for osteoporosis, Hx of vitamin D deficiency, Regular exercise and Dietary Ca intake   Chronic prednisone for PMR  Previously on chronic opioids for back pain; now on duloxetine, gabapentin, advil for pain control  Pt denies Hx of kidney stones, Hx of hyperTH, Hx of hyperPTH and Hx of hypogonadism  Menopause age : early 50s    8/25/21 DXA: Lumbar T score -4.4, Hip T -2.8, Fem neck T -2.9    Pt used to follow with PT and knows about exercises to do  Is very careful about falls precaution.    Jan 2024 6/2023 - thyroid US shows MNG with all 4 nodules not meeting FNA criteria  6/2023 DXA: Lumbar T score -3.2, Hip T -2.5, Fem neck T -2.9  7/6/23 - Prolia #1 given, no hypoCa afterwards  Due for Prolia #2, pt pushing it back slightly due to very cold weather  1/2024 - BMP and vit d at goal      PAST MEDICAL HISTORY:   No past medical history on file.    PAST SURGICAL HISTORY:   Past Surgical History:   Procedure Laterality Date    APPENDECTOMY  10/2023    w perf and peritonitis    EXCIS LUMBAR DISK,ONE LEVEL         CURRENT MEDICATIONS:    Current Outpatient Medications    Medication Sig Dispense Refill    gabapentin 100 MG Oral Cap Take 1 capsule (100 mg total) by mouth 3 (three) times daily. 90 capsule 0    DULoxetine 30 MG Oral Cap DR Particles Take 1 capsule (30 mg total) by mouth daily. 30 capsule 2    simvastatin 10 MG Oral Tab Take 1 tablet (10 mg total) by mouth nightly. 90 tablet 3    predniSONE 2.5 MG Oral Tab Take 1 tablet (2.5 mg total) by mouth daily. 30 tablet 2    Calcium Carb-Cholecalciferol (OS-ADITHYA CALCIUM + D3) 500-5 MG-MCG Oral Tab Take by mouth.      denosumab 60 MG/ML Subcutaneous Solution Prefilled Syringe Inject 1 mL (60 mg total) into the skin one time.      Calcium Carbonate-Vitamin D (CALCIUM + D OR) Take 1 tablet by mouth 2 (two) times daily with meals.      predniSONE 5 MG Oral Tab Take 0.5 tablets (2.5 mg total) by mouth daily.      Compound Medication Apply 1 g topically 4 (four) times daily as needed. Compound Cream: Diclofenac Sodium 3%, Cyclobenzaprine HCL 2%, Lidocaine HCL 2% (15 g tube) 1 each 0    melatonin 3 MG Oral Tab Take 1 tablet (3 mg total) by mouth nightly.       Endocrine Medications            denosumab 60 MG/ML Subcutaneous Solution Prefilled Syringe            ALLERGIES:  No Known Allergies    SOCIAL HISTORY:    Social History     Socioeconomic History    Marital status:    Tobacco Use    Smoking status: Every Day     Packs/day: 1.00     Years: 56.00     Additional pack years: 0.00     Total pack years: 56.00     Types: Cigarettes    Smokeless tobacco: Never   Vaping Use    Vaping Use: Never used   Substance and Sexual Activity    Alcohol use: Never    Drug use: Never       FAMILY HISTORY:   No family history on file.      REVIEW OF SYSTEMS:  Ten point review of systems has been performed and is otherwise negative and/or non-contributory, except as described above.      PHYSICAL EXAM:   There were no vitals filed for this visit.    BMI: There is no height or weight on file to calculate BMI.     CONSTITUTIONAL:  awake, alert,  cooperative, no apparent distress, and appears stated age  PSYCH: normal affect  EYES:  No proptosis,  conjunctiva normal  ENT:  Normocephalic, atraumatic  NECK:  Supple, symmetrical, (+) R thyroid nodule  LUNGS: breathing comfortably  CARDIOVASCULAR:  regular rate       DATA:   Pertinent data reviewed      ASSESSMENT AND PLAN:    (M81.0) Age-related osteoporosis without current pathological fracture  (primary encounter diagnosis)  (M80.80XA) Other osteoporosis with current pathological fracture, initial encounter  (M81.8) Other osteoporosis without current pathological fracture  Plan:   Pt with mult T spine compression fx and DXA with significant osteopenia (lowest T score -4.4 in Aug 2021). Completed 1 yr of Tymlos (Nov [id number]), now ready to resume anti-resorptive treatment. Previous endo completed secondary workup. Pt does have hx of chronic opioid and steroid medication.   Prolia #1 7/6/23  Prolia #2 due now; labs reviewed    - continue falls precaution and weight-bearing exercises as able, diet and exercise   - continue Ca and vit D supplementation as appropriate  - Dental clearance not mandated; pt is mostly adentulous  - Verbalized understanding of risks and benefits   - will follow up DXA in 1 year to determine continuation or change in therapy (pt will call insurance [employer] to see if they cover; otherwise with medicare she's not covered until 2025)      (E04.2) Multinodular goiter  Plan: incidentally found on physical exam in 2023; 6/2023 thyroid US with 4 nodules not meeting FNA criteria  - repeat in 1 yr; if stable, only periodic surveillane        Return to Clinic in 6 months for Prolia #3      1/16/2024  Hemal Garg MD

## 2024-01-22 ENCOUNTER — TELEPHONE (OUTPATIENT)
Facility: CLINIC | Age: 79
End: 2024-01-22

## 2024-01-22 NOTE — TELEPHONE ENCOUNTER
Per Dr. Garg, \"Pt all cleared for Prolia #2, she said she'll check the weather and call ahead to schedule. After her inj, please order q6 month labs and book RN/MD combo appointment.\"    Phoned patient today to f/u on visit. Scheduled RN visit for Prolia injection on Friday 1/26 at 1130.     Closing this encounter.

## 2024-01-26 ENCOUNTER — MED REC SCAN ONLY (OUTPATIENT)
Facility: CLINIC | Age: 79
End: 2024-01-26

## 2024-01-26 ENCOUNTER — NURSE ONLY (OUTPATIENT)
Facility: CLINIC | Age: 79
End: 2024-01-26
Payer: MEDICARE

## 2024-01-26 ENCOUNTER — TELEPHONE (OUTPATIENT)
Facility: CLINIC | Age: 79
End: 2024-01-26

## 2024-01-26 DIAGNOSIS — M81.0 AGE-RELATED OSTEOPOROSIS WITHOUT CURRENT PATHOLOGICAL FRACTURE: Primary | ICD-10-CM

## 2024-01-26 PROCEDURE — 96372 THER/PROPH/DIAG INJ SC/IM: CPT | Performed by: STUDENT IN AN ORGANIZED HEALTH CARE EDUCATION/TRAINING PROGRAM

## 2024-01-26 NOTE — TELEPHONE ENCOUNTER
Patient had Prolia #2 administered at RN Visit on 1/26/24.    Scheduled 6 month f/u appointment with Dr. Marino on 7/30/24 at 12:30p.    Reviewed with patient to have labs done prior to that appointment. Verbalized understanding.     Pended labs and routed to Dr. Garg for review.

## 2024-01-26 NOTE — PROGRESS NOTES
Patient in office for Prolia administration- this is Prolia #2.    Reviewed Prolia administration, possible side effects. Reviewed to call office if any side effects post administration.     Reviewed patient should inform Dentist if any dental work to be done. No dental work currently scheduled.     Administered Prolia 60mg in left upper arm subcutaneously at 1100. Patient tolerated well with no complaints, no side effects noted.    Scheduled 6 month f/u appointment with Dr. Marino on 7/30/24 at 12:30p. Reviewed with patient that she should have labs done prior to that appointment. Verbalized understanding.

## 2024-01-29 DIAGNOSIS — M54.6 MIDLINE THORACIC BACK PAIN, UNSPECIFIED CHRONICITY: ICD-10-CM

## 2024-01-29 DIAGNOSIS — M54.50 LUMBAR BACK PAIN: ICD-10-CM

## 2024-01-29 RX ORDER — GABAPENTIN 100 MG/1
100 CAPSULE ORAL 3 TIMES DAILY
Qty: 90 CAPSULE | Refills: 0 | Status: SHIPPED | OUTPATIENT
Start: 2024-01-29

## 2024-01-29 NOTE — TELEPHONE ENCOUNTER
Medication: gabapentin 100 MG Oral Cap     Date of last refill: 12/27/23    Last office visit: 1/8/24 virtual  Due back to clinic per last office note:  6 months  Date next office visit scheduled:  none      Last OV note recommendation: Plan:   > Continue duloxetine at 30 mg every day: refilled today  > Follow-up every 6 months  > Okay to continue Advil dual action as directed on bottle: Patient taking it approximately 5 times per day and gabapentin 100 mg twice daily  >use TENs twice per day   > Continue calcium vitamin D supplementation with reduced dose per oncology: 2/2 breast cancer  > Okay to continue all nonmedication management for thoracic pain  > If thoracic pain becomes unmanageable we will need to update imaging: Patient is not wanting to update at this point  >call for med refills

## 2024-01-29 NOTE — TELEPHONE ENCOUNTER
Patient calling to request refill of gabapentin 100 MG Oral Cap   Pharmacy [id number] - [place], [place] - [address] AT [facility] [address] [facility] [facility] [address] [phone], [phone]     Patient informed of 48 hour refill policy excluding weekends and holidays.  Informed patient prescription is sent directly to pharmacy.    Further explained patient will not receive a call back once prescription is ready.    No

## 2024-03-01 DIAGNOSIS — M54.50 LUMBAR BACK PAIN: ICD-10-CM

## 2024-03-01 DIAGNOSIS — M54.6 MIDLINE THORACIC BACK PAIN, UNSPECIFIED CHRONICITY: ICD-10-CM

## 2024-03-01 RX ORDER — GABAPENTIN 100 MG/1
100 CAPSULE ORAL 3 TIMES DAILY
Qty: 90 CAPSULE | Refills: 0 | Status: SHIPPED | OUTPATIENT
Start: 2024-03-01 | End: 2024-04-03

## 2024-03-01 NOTE — TELEPHONE ENCOUNTER
Patient calling to request refill of gabapentin 100 MG Oral Cap   Pharmacy [facility] #[id number] - [place], [place] - [address] AT [facility] ([address]), [phone], [phone] [[id number]]     Patient informed of 48 hour refill policy excluding weekends and holidays.  Informed patient prescription is sent directly to pharmacy.    Further explained patient will not receive a call back once prescription is ready.

## 2024-03-01 NOTE — TELEPHONE ENCOUNTER
Medication: gabapentin 100 MG Oral Cap     Date of last refill: 1/29/2024  [place] 1/29/2024    Last office visit: 1/8/2024  Due back to clinic per last office note:  6 months  Date next office visit no schedule          Last OV note recommendation: Impression:   Pat Roach presents with complaints of thoracic pain just below the bra line.  Patient states that the pain has been manageable with her current regimen of gabapentin 100 mg 3 times daily and duloxetine 30 mg in the morning.  Patient states that there are still some activities that aggravate her symptoms but she feels that topical medications have been helpful.  Since her last visit she was hospitalized for acute appendicitis that required surgery. .          Patient had been on opioids in the past and does not want to use opioid medications for her pain as she had difficulty weaning off of these.  Patient denies any radiating quality to the pain.       Patient does state that the pain that she is having is being modulated with her treatment.  She she feels that her pain is manageable with her current treatments..

## 2024-03-12 ENCOUNTER — TELEPHONE (OUTPATIENT)
Dept: FAMILY MEDICINE CLINIC | Facility: CLINIC | Age: 79
End: 2024-03-12

## 2024-03-12 DIAGNOSIS — E78.2 MIXED HYPERLIPIDEMIA: Primary | ICD-10-CM

## 2024-03-12 DIAGNOSIS — R73.9 HYPERGLYCEMIA: ICD-10-CM

## 2024-03-12 NOTE — TELEPHONE ENCOUNTER
Pt has appt with  on Monday.  She wanted to do her labs on Friday.  Please order labs.    Lab appt scheduled so call if there is an issue.

## 2024-03-12 NOTE — TELEPHONE ENCOUNTER
Patient has appointment scheduled    Future Appointments   Date Time Provider Department Center   3/15/2024  9:00 AM REF [id number] [place]   3/18/2024 10:20 AM Alfredito Porter MD [facility] [place]   7/30/2024 12:30 PM Apolonia Marino DO [id number] [facility] [place]

## 2024-03-12 NOTE — TELEPHONE ENCOUNTER
See intake message    Current blood work ordered by Dr Garg: PTH, Intact, Alk Phosphatase, bone specific, Vitamin D, BMP      Last Chem profile/CBC done 11-6-23  Alfredito Porter MD  11/7/2023 12:12 PM CST       The cbc has returned near normal and no need to check again  The chem is normal

## 2024-03-15 ENCOUNTER — LABORATORY ENCOUNTER (OUTPATIENT)
Dept: LAB | Age: 79
End: 2024-03-15
Attending: FAMILY MEDICINE
Payer: MEDICARE

## 2024-03-15 DIAGNOSIS — R73.9 HYPERGLYCEMIA: ICD-10-CM

## 2024-03-15 DIAGNOSIS — E78.2 MIXED HYPERLIPIDEMIA: ICD-10-CM

## 2024-03-15 LAB
ALBUMIN SERPL-MCNC: 4 G/DL (ref 3.4–5)
ALBUMIN/GLOB SERPL: 1.3 {RATIO} (ref 1–2)
ALP LIVER SERPL-CCNC: 82 U/L
ALT SERPL-CCNC: 20 U/L
ANION GAP SERPL CALC-SCNC: 3 MMOL/L (ref 0–18)
AST SERPL-CCNC: 22 U/L (ref 15–37)
BILIRUB SERPL-MCNC: 0.5 MG/DL (ref 0.1–2)
BUN BLD-MCNC: 15 MG/DL (ref 9–23)
CALCIUM BLD-MCNC: 10.3 MG/DL (ref 8.5–10.1)
CHLORIDE SERPL-SCNC: 107 MMOL/L (ref 98–112)
CHOLEST SERPL-MCNC: 185 MG/DL (ref ?–200)
CO2 SERPL-SCNC: 30 MMOL/L (ref 21–32)
CREAT BLD-MCNC: 0.91 MG/DL
EGFRCR SERPLBLD CKD-EPI 2021: 65 ML/MIN/1.73M2 (ref 60–?)
EST. AVERAGE GLUCOSE BLD GHB EST-MCNC: 126 MG/DL (ref 68–126)
FASTING PATIENT LIPID ANSWER: YES
FASTING STATUS PATIENT QL REPORTED: YES
GLOBULIN PLAS-MCNC: 3.1 G/DL (ref 2.8–4.4)
GLUCOSE BLD-MCNC: 102 MG/DL (ref 70–99)
HBA1C MFR BLD: 6 % (ref ?–5.7)
HDLC SERPL-MCNC: 82 MG/DL (ref 40–59)
LDLC SERPL CALC-MCNC: 78 MG/DL (ref ?–100)
NONHDLC SERPL-MCNC: 103 MG/DL (ref ?–130)
OSMOLALITY SERPL CALC.SUM OF ELEC: 291 MOSM/KG (ref 275–295)
POTASSIUM SERPL-SCNC: 5.3 MMOL/L (ref 3.5–5.1)
PROT SERPL-MCNC: 7.1 G/DL (ref 6.4–8.2)
SODIUM SERPL-SCNC: 140 MMOL/L (ref 136–145)
TRIGL SERPL-MCNC: 147 MG/DL (ref 30–149)
VLDLC SERPL CALC-MCNC: 23 MG/DL (ref 0–30)

## 2024-03-15 PROCEDURE — 36415 COLL VENOUS BLD VENIPUNCTURE: CPT

## 2024-03-15 PROCEDURE — 83036 HEMOGLOBIN GLYCOSYLATED A1C: CPT

## 2024-03-15 PROCEDURE — 80053 COMPREHEN METABOLIC PANEL: CPT

## 2024-03-15 PROCEDURE — 80061 LIPID PANEL: CPT

## 2024-03-18 ENCOUNTER — OFFICE VISIT (OUTPATIENT)
Dept: FAMILY MEDICINE CLINIC | Facility: CLINIC | Age: 79
End: 2024-03-18
Payer: MEDICARE

## 2024-03-18 VITALS
OXYGEN SATURATION: 100 % | HEIGHT: 61 IN | BODY MASS INDEX: 26.81 KG/M2 | WEIGHT: 142 LBS | DIASTOLIC BLOOD PRESSURE: 68 MMHG | RESPIRATION RATE: 12 BRPM | HEART RATE: 80 BPM | TEMPERATURE: 98 F | SYSTOLIC BLOOD PRESSURE: 120 MMHG

## 2024-03-18 DIAGNOSIS — M35.3 POLYMYALGIA RHEUMATICA (HCC): ICD-10-CM

## 2024-03-18 DIAGNOSIS — R73.9 HYPERGLYCEMIA: ICD-10-CM

## 2024-03-18 DIAGNOSIS — Z90.13 S/P BILATERAL MASTECTOMY: ICD-10-CM

## 2024-03-18 DIAGNOSIS — Z86.010 HX OF ADENOMATOUS COLONIC POLYPS: ICD-10-CM

## 2024-03-18 DIAGNOSIS — E78.2 MIXED HYPERLIPIDEMIA: ICD-10-CM

## 2024-03-18 DIAGNOSIS — M80.80XA OTHER OSTEOPOROSIS WITH CURRENT PATHOLOGICAL FRACTURE, INITIAL ENCOUNTER: ICD-10-CM

## 2024-03-18 DIAGNOSIS — Q83.8 AMASTIA: ICD-10-CM

## 2024-03-18 DIAGNOSIS — F17.200 TOBACCO USE DISORDER: ICD-10-CM

## 2024-03-18 DIAGNOSIS — C50.912 LOBULAR CARCINOMA OF LEFT BREAST (HCC): ICD-10-CM

## 2024-03-18 DIAGNOSIS — Z79.899 ENCOUNTER FOR LONG-TERM (CURRENT) USE OF MEDICATIONS: ICD-10-CM

## 2024-03-18 DIAGNOSIS — D22.9 ATYPICAL NEVUS: Primary | ICD-10-CM

## 2024-03-18 DIAGNOSIS — G89.29 OTHER CHRONIC PAIN: ICD-10-CM

## 2024-03-18 DIAGNOSIS — E78.2 MIXED HYPERLIPIDEMIA: Primary | ICD-10-CM

## 2024-03-18 DIAGNOSIS — Z00.00 MEDICARE ANNUAL WELLNESS VISIT, SUBSEQUENT: ICD-10-CM

## 2024-03-18 DIAGNOSIS — K63.5 POLYP OF COLON, UNSPECIFIED PART OF COLON, UNSPECIFIED TYPE: ICD-10-CM

## 2024-03-23 PROBLEM — F43.21 SITUATIONAL DEPRESSION: Status: RESOLVED | Noted: 2023-05-02 | Resolved: 2024-03-23

## 2024-03-23 PROBLEM — M54.6 MIDLINE THORACIC BACK PAIN: Status: RESOLVED | Noted: 2021-07-26 | Resolved: 2024-03-23

## 2024-03-23 PROBLEM — F41.8 SITUATIONAL ANXIETY: Status: RESOLVED | Noted: 2021-07-26 | Resolved: 2024-03-23

## 2024-03-23 PROBLEM — S22.070A COMPRESSION FRACTURE OF T9 VERTEBRA (HCC): Status: RESOLVED | Noted: 2021-07-26 | Resolved: 2024-03-23

## 2024-03-24 NOTE — PROGRESS NOTES
2Subjective:   Pat Roach is a 78 year old female who presents for a Medicare Subsequent Annual Wellness visit (Pt already had Initial Annual Wellness) and scheduled follow up of multiple significant but stable problems.     Osteoporosis steady  Back pain better compression fracture seems to be healed    Discussed that the visual acuity is lower and she may need to reestablish with her eye care specialist to see we will ask for her recent exam in which she was told she had normal vision    Polymyalgia seems controlled  Patient was off steroids when she was in the hospital recently.  She did not have any ill effects and tolerated this well.  For this reason it seems appropriate that she can stop the prednisone without any ill effects if she does have some problems we can try to initiate a slower taper.        History/Other:   Fall Risk Assessment:   She has been screened for Falls and is low risk.      Cognitive Assessment:   She had a completely normal cognitive assessment - see flowsheet entries     Functional Ability/Status:   Pat Roach has a completely normal functional assessment. See flowsheet for details.      Depression Screening (PHQ-2/PHQ-9): PHQ-2 SCORE: 0  , done 3/18/2024             Advanced Directives:   She does NOT have a Living Will. [Do you have a living will?: No]  She does NOT have a Power of  for Health Care. [Do you have a healthcare power of ?: No]  Discussed Advance Care Planning with patient (and family/surrogate if present). Standard forms made available to patient in After Visit Summary.      Patient Active Problem List   Diagnosis    Hx of adenomatous colonic polyps    Hyperlipidemia    Osteoporosis    Tobacco use disorder    Polymyalgia rheumatica (HCC)    Other chronic pain     Allergies:  She has No Known Allergies.    Current Medications:  Outpatient Medications Marked as Taking for the 3/18/24 encounter (Office Visit) with Alfredito Porter MD   Medication Sig     gabapentin 100 MG Oral Cap Take 1 capsule (100 mg total) by mouth 3 (three) times daily.    DULoxetine 30 MG Oral Cap DR Particles Take 1 capsule (30 mg total) by mouth daily.    [DISCONTINUED] simvastatin 10 MG Oral Tab Take 1 tablet (10 mg total) by mouth nightly.    [] predniSONE 2.5 MG Oral Tab Take 1 tablet (2.5 mg total) by mouth daily.    Calcium Carb-Cholecalciferol (OS-ADITHYA CALCIUM + D3) 500-5 MG-MCG Oral Tab Take by mouth.    denosumab 60 MG/ML Subcutaneous Solution Prefilled Syringe Inject 1 mL (60 mg total) into the skin one time.    melatonin 3 MG Oral Tab Take 1 tablet (3 mg total) by mouth nightly.       Medical History:  She  has a past medical history of Amastia, Compression fracture of T9 vertebra (HCC), Lobular carcinoma of breast (HCC), Malignant neoplasm of breast (HCC), Midline thoracic back pain, S/P bilateral mastectomy, S/P breast reconstruction, bilateral, Situational anxiety, Situational depression, and Symptomatic menopausal or female climacteric states.  Surgical History:  She  has a past surgical history that includes excis lumbar disk,one level and appendectomy (10/2023).   Family History:  Her family history is not on file.  Social History:  She  reports that she has been smoking cigarettes. She has a 56 pack-year smoking history. She has never used smokeless tobacco. She reports that she does not drink alcohol and does not use drugs.    Tobacco:  She currently smokes tobacco.  Social History    Tobacco Use      Smoking status: Every Day        Packs/day: 1.00        Years: 56.00        Additional pack years: 0.00        Total pack years: 56.00        Types: Cigarettes      Smokeless tobacco: Never     Discussed smoking cessation patient is not interested at this time      CAGE Alcohol Screen:   CAGE screening score of 0 on 3/18/2024, showing low risk of alcohol abuse.      Patient Care Team:  Alfredito Porter MD as PCP - General (Family Medicine)  Oscar Ruiz (Internal  Medicine)  Jeremy Kan, PT as Physical Therapist  Brauweiler, Mark, GRISELDA as Physical Therapist    Review of Systems  GENERAL: feels well otherwise  SKIN: denies any unusual skin lesions  EYES: denies blurred vision or double vision  HEENT: denies nasal congestion, sinus pain or ST  LUNGS: denies shortness of breath with exertion  CARDIOVASCULAR: denies chest pain on exertion  GI: denies abdominal pain, denies heartburn  : denies dysuria, vaginal discharge or itching, no complaint of urinary incontinence   MUSCULOSKELETAL: denies back pain  NEURO: denies headaches  PSYCHE: denies depression or anxiety  HEMATOLOGIC: denies hx of anemia  ENDOCRINE: denies thyroid history  ALL/ASTHMA: denies hx of allergy or asthma    Objective:   Physical Exam  General Appearance:  Alert, cooperative, no distress, appears stated age   Head:  Normocephalic, without obvious abnormality, atraumatic   Eyes:  PERRL, conjunctiva/corneas clear, EOM's intact both eyes   Ears:  Normal TM's and external ear canals, both ears   Nose: Nares normal, septum midline,mucosa normal, no drainage or sinus tenderness   Throat: Lips, mucosa, and tongue normal; teeth and gums normal   Neck: Supple, symmetrical, trachea midline, no adenopathy;  thyroid: not enlarged, symmetric, no tenderness/mass/nodules; no carotid bruit or JVD   Back:   Symmetric, no curvature, ROM normal, no CVA tenderness   Lungs:   Clear to auscultation bilaterally, respirations unlabored   Heart:  Regular rate and rhythm, S1 and S2 normal, no murmur, rub, or gallop   Abdomen:   Soft, non-tender, bowel sounds active all four quadrants,  no masses, no organomegaly   Pelvic: Deferred   Extremities: Extremities normal, atraumatic, no cyanosis or edema   Pulses: 2+ and symmetric   Skin: Skin atypical nevus with some discoloration and abnormal border noted this is on the temple area.  Should refer to Derm   Lymph nodes: Cervical, supraclavicular, and axillary nodes normal   Neurologic:  Normal       /68 (BP Location: Right arm, Patient Position: Sitting, Cuff Size: large)   Pulse 80   Temp 97.5 °F (36.4 °C) (Temporal)   Resp 12   Ht 5' 1\" (1.549 m)   Wt 142 lb (64.4 kg)   SpO2 100%   BMI 26.83 kg/m²  Estimated body mass index is 26.83 kg/m² as calculated from the following:    Height as of this encounter: 5' 1\" (1.549 m).    Weight as of this encounter: 142 lb (64.4 kg).    Medicare Hearing Assessment:   Hearing Screening    Time taken: 3/18/2024 10:27 AM  Entry User: Ifrah Duncan MA  Screening Method: Whisper Test  Whisper Test Result: Pass         Visual Acuity:   Right Eye Visual Acuity: Corrected Right Eye Chart Acuity: 20/70   Left Eye Visual Acuity: Corrected Left Eye Chart Acuity: 20/200   Both Eyes Visual Acuity: Corrected Both Eyes Chart Acuity: 20/100   Able To Tolerate Visual Acuity: Yes        Assessment & Plan:   Pat Roach is a 78 year old female who presents for a Medicare Assessment.     1. Atypical nevus (Primary)  -     DERM - EXTERNAL  2. Polyp of colon, unspecified part of colon, unspecified type  -     GASTRO - EXTERNAL  3. Encounter for long-term (current) use of medications  4. Amastia  5. S/P bilateral mastectomy  6. Other osteoporosis with current pathological fracture, initial encounter  Overview:  Postmenopausal. Diagnosed October 2015. Baseline at start of Femara.  Postmenopausal. Diagnosed October 2015. Baseline at start of Femara.  Assessment & Plan:   Stable    [x] chronic stable condition, noted historically, continues present status    7. Mixed hyperlipidemia  Assessment & Plan:  As for her cholesterol, Lipids are well controlled, no significant medication side effects noted.   PLAN: will continue present medications, reviewed diet, exercise and weight control, and labs as ordered        8. Lobular carcinoma of left breast (HCC)  Overview:  Dx/ 9/2015. LEFT. T1c,N0,M0. Lobular. ER+,CT+,Her2/Dania-. Oncotype Dx. Risk = 10%. Rx. MRM > Femara.  Dx.  9/2015. RIGHT.T1c,N0,M0. Lobular + LCIS, ER+,AR+,Her2/Dania-. Oncotype Dx. 11% risk. Rx. MRM + SND > Femara.  Dx/ 9/2015. LEFT. T1c,N0,M0. Lobular. ER+,AR+,Her2/Danai-. Oncotype Dx. Risk = 10%. Rx. MRM > Femara.  9. Other chronic pain  Assessment & Plan:   Stable    [x] chronic stable condition, noted historically, continues present status    10. Polymyalgia rheumatica (HCC)  Assessment & Plan:  Patient will stop steroids today per our discussion  11. Tobacco use disorder  12. Hx of adenomatous colonic polyps  Assessment & Plan:  Refer to gastroenterology for evaluation  13. Medicare annual wellness visit, subsequent    The patient indicates understanding of these issues and agrees to the plan.  Consult ordered.  Continue with current treatment plan.  Reinforced healthy diet, lifestyle, and exercise.      No follow-ups on file.     Alfredito Porter MD, 3/23/2024     Supplementary Documentation:   General Health:  In the past six months, have you lost more than 10 pounds without trying?: 2 - No  Has your appetite been poor?: No  Type of Diet: Balanced  How does the patient maintain a good energy level?: Appropriate Exercise;Daily Walks;Stretching  How would you describe your daily physical activity?: Moderate  How would you describe your current health state?: Fair  How do you maintain positive mental well-being?: Social Interaction;Puzzles;Games;Visiting Friends;Visiting Family  On a scale of 0 to 10, with 0 being no pain and 10 being severe pain, what is your pain level?: 0 - (None)  In the past six months, have you experienced urine leakage?: 0-No  At any time do you feel concerned for the safety/well-being of yourself and/or your children, in your home or elsewhere?: No  Have you had any immunizations at another office such as Influenza, Hepatitis B, Tetanus, or Pneumococcal?: No       Pat Roach's SCREENING SCHEDULE   Tests on this list are recommended by your physician but may not be covered, or covered at this  frequency, by your insurer.   Please check with your insurance carrier before scheduling to verify coverage.   PREVENTATIVE SERVICES FREQUENCY &  COVERAGE DETAILS LAST COMPLETION DATE   Diabetes Screening    Fasting Blood Sugar /  Glucose    One screening every 12 months if never tested or if previously tested but not diagnosed with pre-diabetes   One screening every 6 months if diagnosed with pre-diabetes Lab Results   Component Value Date     (H) 03/15/2024        Cardiovascular Disease Screening    Lipid Panel  Cholesterol  Lipoprotein (HDL)  Triglycerides Covered every 5 years for all Medicare beneficiaries without apparent signs or symptoms of cardiovascular disease Lab Results   Component Value Date    CHOLEST 185 03/15/2024    HDL 82 (H) 03/15/2024    LDL 78 03/15/2024    TRIG 147 03/15/2024         Electrocardiogram (EKG)   Covered if needed at Welcome to Medicare, and non-screening if indicated for medical reasons -      Ultrasound Screening for Abdominal Aortic Aneurysm (AAA) Covered once in a lifetime for one of the following risk factors    Men who are 65-75 years old and have ever smoked    Anyone with a family history -     Colorectal Cancer Screening  Covered for ages 50-85; only need ONE of the following:    Colonoscopy   Covered every 10 years    Covered every 2 years if patient is at high risk or previous colonoscopy was abnormal 08/18/2017    Health Maintenance   Topic Date Due    Colorectal Cancer Screening  08/18/2022       Flexible Sigmoidoscopy   Covered every 4 years -    Fecal Occult Blood Test Covered annually -   Bone Density Screening    Bone density screening    Covered every 2 years after age 65 if diagnosed with risk of osteoporosis or estrogen deficiency.    Covered yearly for long-term glucocorticoid medication use (Steroids) Last Dexa Scan:    XR DEXA BONE DENSITOMETRY (CPT=77080) 06/27/2023      No recommendations at this time   Pap and Pelvic    Pap   Covered every 2 years  for women at normal risk; Annually if at high risk -  No recommendations at this time    Chlamydia Annually if high risk -  No recommendations at this time   Screening Mammogram    Mammogram     Recommend annually for all female patients aged 40 and older    One baseline mammogram covered for patients aged 35-39 -    No recommendations at this time    Immunizations    Influenza Covered once per flu season  Please get every year 11/22/2023  No recommendations at this time    Pneumococcal Each vaccine ([id number] & [id number]) covered once after 65 Prevnar 13: 03/02/2017    Mrsvgvldg18: 11/13/2013     No recommendations at this time    Hepatitis B One screening covered for patients with certain risk factors   -  No recommendations at this time    Tetanus Toxoid Not covered by Medicare Part B unless medically necessary (cut with metal); may be covered with your pharmacy prescription benefits -    Tetanus, Diptheria and Pertusis TD and TDaP Not covered by Medicare Part B -  No recommendations at this time    Zoster Not covered by Medicare Part B; may be covered with your pharmacy  prescription benefits -  Zoster Vaccines(1 of 2) Never done     Annual Monitoring of Persistent Medications (ACE/ARB, digoxin diuretics, anticonvulsants)    Potassium Annually Lab Results   Component Value Date    K 5.3 (H) 03/15/2024         Creatinine   Annually Lab Results   Component Value Date    CREATSERUM 0.91 03/15/2024         BUN Annually Lab Results   Component Value Date    BUN 15 03/15/2024       Drug Serum Conc Annually No results found for: \"DIGOXIN\", \"DIG\", \"VALP\"

## 2024-04-02 ENCOUNTER — MED REC SCAN ONLY (OUTPATIENT)
Dept: FAMILY MEDICINE CLINIC | Facility: CLINIC | Age: 79
End: 2024-04-02

## 2024-04-03 DIAGNOSIS — M54.50 LUMBAR BACK PAIN: ICD-10-CM

## 2024-04-03 DIAGNOSIS — M54.6 MIDLINE THORACIC BACK PAIN, UNSPECIFIED CHRONICITY: ICD-10-CM

## 2024-04-03 RX ORDER — GABAPENTIN 100 MG/1
100 CAPSULE ORAL 3 TIMES DAILY
Qty: 90 CAPSULE | Refills: 0 | Status: SHIPPED | OUTPATIENT
Start: 2024-04-03

## 2024-04-03 NOTE — TELEPHONE ENCOUNTER
Patient calling to request refill of gabapentin 100 MG Oral Cap   Pharmacy [facility] #[id number] - [place], [place] - [address] AT [facility] OF [address] [facility] & [facility] [address] [phone], [phone] [[id number]]     Patient informed of 48 hour refill policy excluding weekends and holidays.  Informed patient prescription is sent directly to pharmacy.    Further explained patient will not receive a call back once prescription is ready.

## 2024-04-03 NOTE — TELEPHONE ENCOUNTER
Medication:   gabapentin 100 MG Oral Cap       Date of last refill: 3/1/24    Last office visit: 1/8/24 Virtual  Due back to clinic per last office note:  FU 6 months  Date next office visit scheduled:  NA    Last OV note recommendation:   Medical Decision Making:   Diagnosis:         Encounter Diagnoses   Name Primary?    Lumbar back pain Yes    Pathological fracture of other site due to osteoporosis, unspecified osteoporosis type, initial encounter      Other chronic pain        Impression:   Pat Roach presents with complaints of thoracic pain just below the bra line.  Patient states that the pain has been manageable with her current regimen of gabapentin 100 mg 3 times daily and duloxetine 30 mg in the morning.  Patient states that there are still some activities that aggravate her symptoms but she feels that topical medications have been helpful.  Since her last visit she was hospitalized for acute appendicitis that required surgery. .          Patient had been on opioids in the past and does not want to use opioid medications for her pain as she had difficulty weaning off of these.  Patient denies any radiating quality to the pain.       Patient does state that the pain that she is having is being modulated with her treatment.  She she feels that her pain is manageable with her current treatments..         Total visit time: 20 minutes  More than 50% spent coordinating care, providing patient education, reviewing imaging, discussing conservative vs surgical treatment options and counseling.           Plan:   > Continue duloxetine at 30 mg every day: refilled today  > Follow-up every 6 months  > Okay to continue Advil dual action as directed on bottle: Patient taking it approximately 5 times per day and gabapentin 100 mg twice daily  >use TENs twice per day   > Continue calcium vitamin D supplementation with reduced dose per oncology: 2/2 breast cancer  > Okay to continue all nonmedication management for  thoracic pain  > If thoracic pain becomes unmanageable we will need to update imaging: Patient is not wanting to update at this point  >call for med refills  No orders of the defined types were placed in this encounter.        Meds & Refills for this Visit:  Requested Prescriptions        No prescriptions requested or ordered in this encounter         Imaging & Consults:  None     The patient indicates understanding of these issues and agrees to the plan.        ID#[id number]       5

## 2024-04-09 DIAGNOSIS — M54.50 LUMBAR BACK PAIN: ICD-10-CM

## 2024-04-09 DIAGNOSIS — M54.6 MIDLINE THORACIC BACK PAIN, UNSPECIFIED CHRONICITY: ICD-10-CM

## 2024-04-09 DIAGNOSIS — M80.00XG AGE-RELATED OSTEOPOROSIS WITH CURRENT PATHOLOGICAL FRACTURE WITH DELAYED HEALING, SUBSEQUENT ENCOUNTER: ICD-10-CM

## 2024-04-09 RX ORDER — DULOXETIN HYDROCHLORIDE 30 MG/1
30 CAPSULE, DELAYED RELEASE ORAL DAILY
Qty: 30 CAPSULE | Refills: 2 | Status: SHIPPED | OUTPATIENT
Start: 2024-04-09

## 2024-05-03 DIAGNOSIS — M54.50 LUMBAR BACK PAIN: ICD-10-CM

## 2024-05-03 DIAGNOSIS — M54.6 MIDLINE THORACIC BACK PAIN, UNSPECIFIED CHRONICITY: ICD-10-CM

## 2024-05-03 RX ORDER — GABAPENTIN 100 MG/1
100 CAPSULE ORAL 3 TIMES DAILY
Qty: 90 CAPSULE | Refills: 0 | Status: SHIPPED | OUTPATIENT
Start: 2024-05-03

## 2024-05-03 NOTE — TELEPHONE ENCOUNTER
Patient calling to request refill of gabapentin 100 MG Oral Cap   Pharmacy [facility] #[id number] - [place], [place] - [address] AT [facility] [facility] OF [facility] [facility] & [facility] ([address]), [phone], [phone] [[id number]]      Patient informed of 48 hour refill policy excluding weekends and holidays.  Informed patient prescription is sent directly to pharmacy.    Further explained patient will not receive a call back once prescription is ready.

## 2024-05-03 NOTE — TELEPHONE ENCOUNTER
Medication: gabapentin 100 MG     Date of last refill: 04/03/24      Last office visit: 01/08/24 virtual  Due back to clinic per last office note:  6 months  Date next office visit scheduled:  none        Last OV note recommendation:   Plan:   > Continue duloxetine at 30 mg every day: refilled today  > Follow-up every 6 months  > Okay to continue Advil dual action as directed on bottle: Patient taking it approximately 5 times per day and gabapentin 100 mg twice daily  >use TENs twice per day   > Continue calcium vitamin D supplementation with reduced dose per oncology: 2/2 breast cancer  > Okay to continue all nonmedication management for thoracic pain  > If thoracic pain becomes unmanageable we will need to update imaging: Patient is not wanting to update at this point  >call for med refills  No orders of the defined types were placed in this encounter.

## 2024-06-06 DIAGNOSIS — M54.50 LUMBAR BACK PAIN: ICD-10-CM

## 2024-06-06 DIAGNOSIS — M54.6 MIDLINE THORACIC BACK PAIN, UNSPECIFIED CHRONICITY: ICD-10-CM

## 2024-06-06 RX ORDER — GABAPENTIN 100 MG/1
100 CAPSULE ORAL 3 TIMES DAILY
Qty: 90 CAPSULE | Refills: 0 | Status: SHIPPED | OUTPATIENT
Start: 2024-06-06

## 2024-06-06 NOTE — TELEPHONE ENCOUNTER
Medication:   gabapentin 100 MG Oral Cap     Date of last refill: 5/3/24    Last office visit: 1/8/24  Due back to clinic per last office note:  FU every 6 months  Date next office visit scheduled:  6/11/24    Last OV note recommendation:   Medical Decision Making:   Diagnosis:         Encounter Diagnoses   Name Primary?    Lumbar back pain Yes    Pathological fracture of other site due to osteoporosis, unspecified osteoporosis type, initial encounter      Other chronic pain        Impression:   Pat Roach presents with complaints of thoracic pain just below the bra line.  Patient states that the pain has been manageable with her current regimen of gabapentin 100 mg 3 times daily and duloxetine 30 mg in the morning.  Patient states that there are still some activities that aggravate her symptoms but she feels that topical medications have been helpful.  Since her last visit she was hospitalized for acute appendicitis that required surgery. .          Patient had been on opioids in the past and does not want to use opioid medications for her pain as she had difficulty weaning off of these.  Patient denies any radiating quality to the pain.       Patient does state that the pain that she is having is being modulated with her treatment.  She she feels that her pain is manageable with her current treatments..         Total visit time: 20 minutes  More than 50% spent coordinating care, providing patient education, reviewing imaging, discussing conservative vs surgical treatment options and counseling.           Plan:   > Continue duloxetine at 30 mg every day: refilled today  > Follow-up every 6 months  > Okay to continue Advil dual action as directed on bottle: Patient taking it approximately 5 times per day and gabapentin 100 mg twice daily  >use TENs twice per day   > Continue calcium vitamin D supplementation with reduced dose per oncology: 2/2 breast cancer  > Okay to continue all nonmedication management for  thoracic pain  > If thoracic pain becomes unmanageable we will need to update imaging: Patient is not wanting to update at this point  >call for med refills  No orders of the defined types were placed in this encounter.        Meds & Refills for this Visit:  Requested Prescriptions        No prescriptions requested or ordered in this encounter         Imaging & Consults:  None     The patient indicates understanding of these issues and agrees to the plan.        ID#[id number]

## 2024-06-06 NOTE — TELEPHONE ENCOUNTER
Patient calling to request refill of gabapentin 100 MG Oral Cap   Pharmacy   [facility] #[id number] - [place], [place] - [address] AT [facility] OF [facility] & [facility] ([address]), [phone], [phone]       Patient informed of 48 hour refill policy excluding weekends and holidays.   Informed patient prescription is sent directly to pharmacy.    Further explained patient will not receive a call back once prescription is ready.

## 2024-06-10 ENCOUNTER — TELEPHONE (OUTPATIENT)
Facility: CLINIC | Age: 79
End: 2024-06-10

## 2024-06-10 NOTE — TELEPHONE ENCOUNTER
Received fax from [employer] asking for Benefits Re-verification information. Noted next scheduled [employer] date of 7/30/24 and return faxed to [phone].     Will await benefits re-determination.

## 2024-06-11 ENCOUNTER — VIRTUAL PHONE E/M (OUTPATIENT)
Dept: PAIN CLINIC | Facility: CLINIC | Age: 79
End: 2024-06-11
Payer: MEDICARE

## 2024-06-11 DIAGNOSIS — G89.29 OTHER CHRONIC PAIN: ICD-10-CM

## 2024-06-11 DIAGNOSIS — F43.21 SITUATIONAL DEPRESSION: ICD-10-CM

## 2024-06-11 DIAGNOSIS — M54.6 MIDLINE THORACIC BACK PAIN, UNSPECIFIED CHRONICITY: ICD-10-CM

## 2024-06-11 DIAGNOSIS — M80.00XA PATHOLOGICAL FRACTURE OF OTHER SITE DUE TO OSTEOPOROSIS, UNSPECIFIED OSTEOPOROSIS TYPE, INITIAL ENCOUNTER: ICD-10-CM

## 2024-06-11 DIAGNOSIS — M54.50 LUMBAR BACK PAIN: Primary | ICD-10-CM

## 2024-06-11 DIAGNOSIS — M80.00XG AGE-RELATED OSTEOPOROSIS WITH CURRENT PATHOLOGICAL FRACTURE WITH DELAYED HEALING, SUBSEQUENT ENCOUNTER: ICD-10-CM

## 2024-06-11 PROCEDURE — 99213 OFFICE O/P EST LOW 20 MIN: CPT | Performed by: NURSE PRACTITIONER

## 2024-06-11 NOTE — PROGRESS NOTES
HPI:   Pat Roach presents for f/u: she was last seen telephone visit on January 2024.  She has good and bad days/she works in the garden and the next day she feels it/she also states that she works in the garden and does not feel it the next day.   She feels primary pain in mid back and lower back.    She continues her prolia treatment    She has been increased cymbalta to 30 mg daily to help with her pain and her mood/she feels this is helpful.        Patient presents with complaints of thoracic pain just below the bra line. Patient stated that the pain is manageable with all of her current treatment.  She is able to do activities of daily living.      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 No falls/injuries    Patient continues using Advil dual action, gabapentin 100 mg 3 times daily along with the duloxetine 30 mg in the morning as she feels it does give her energy.    Additional nonprescription treatments include application of pain patches, topical creams and  TENS unit which is helping her pain.  Injection therapy did not resolve her symptoms.    Patient had been on opioids in the past and does not want to use opioid medications for her pain as she had difficulty weaning off of these.  Patient denies any radiating quality to the pain.    Patient does use a soft brace as needed if she does need to do things such as the dishes or cooking or standing at a counter.      The pain is described as mild aching, grabbing, soreness that is constant .  The patient’s activity level has increased since last visit.  The pain is worst in the early morning.  And related to activities    The following activities will increase the patient’s pain: Standing walking grocery shopping doing the dishes cooking standing at a counter. Standing too long, frying caro but she feels it is doable/manageable.    The following activities decrease the patient’s pain: medications, limiting activity level, changing position, Wearing a  brace using a TENS unit    Functional Assessment: Patient reports that they are able to complete all of their ADL's such as eating, bathing, using the toilet, dressing and getting up from a bed or a chair independently.     Patient's  does help her at times    Current Medications:  Current Outpatient Medications   Medication Sig Dispense Refill    gabapentin 100 MG Oral Cap Take 1 capsule (100 mg total) by mouth 3 (three) times daily. 90 capsule 0    DULoxetine 30 MG Oral Cap DR Particles Take 1 capsule (30 mg total) by mouth daily. 30 capsule 2    Calcium Carb-Cholecalciferol (OS-ADITHYA CALCIUM + D3) 500-5 MG-MCG Oral Tab Take by mouth.      denosumab 60 MG/ML Subcutaneous Solution Prefilled Syringe Inject 1 mL (60 mg total) into the skin one time.      Calcium Carbonate-Vitamin D (CALCIUM + D OR) Take 1 tablet by mouth 2 (two) times daily with meals.      Compound Medication Apply 1 g topically 4 (four) times daily as needed. Compound Cream: Diclofenac Sodium 3%, Cyclobenzaprine HCL 2%, Lidocaine HCL 2% (15 g tube) 1 each 0    melatonin 3 MG Oral Tab Take 1 tablet (3 mg total) by mouth nightly.        Patient requires assistance with: No assistance required  Have you recently had any feelings of harming yourself or others? The patient's response was no.  However patient reports that her mood has been affected by general pain complaints    Physical Exam:   There were no vitals taken for this visit. phone visit  VAS Pain Score: 2/10 manageable  Radiology/Lab Test Reviewed:   Last MRI in the system reviewed from 2022 September   Show images for XR THORACIC SPINE (3 VIEWS) (CPT=72072)  Study Result    Narrative   PROCEDURE:  XR THORACIC SPINE (3 VIEWS) (CPT=72072)     LOCATION:                                           TECHNIQUE:  AP, lateral, and swimmer's views of the thoracic spine were obtained.     COMPARISON:  [facility], MR, MRI THORACIC SPINE W WO CONTRAST, 6/09/2021, 9:03 AM.  [place], XR, XR LUMBAR  SPINE COMPLETE W/FLEX + EXT (CPT=72114), 9/12/2022, 1:23 PM.  [place], XR, XR THORACIC SPINE (3 VIEWS) (CPT=72072), 8/31/2021, 4:35 PM.     INDICATIONS:  M54.6 Midline thoracic back pain, unspecified chronicity     PATIENT STATED HISTORY: (As transcribed by Technologist)  Pain in lower T spine and upper L.Spine  She had cement placed in T Spine and the vertebra were fractured above and below that area.         FINDINGS:      BONES:  Decreased bone mineralization limits evaluation of the osseous structures.  Re-identified are multilevel compression fractures including T8, T11 and T12 with post vertebroplasty changes involving approximately the T9 vertebral body.  Mild  multilevel degenerative disc disease.  PARASPINOUS:  No paraspinous abnormality is seen.                   Impression   CONCLUSION:    1. Stable findings when compared to most recent thoracic spine radiographs dating back to 8/31/2021 as detailed above.        Dictated by (CST): Roya Marquis MD on 9/12/2022 at 3:00 PM      Finalized by (CST): Roya Marquis MD on 9/12/2022 at 4:02 PM     PROCEDURE:  XR LUMBAR SPINE COMPLETE W/FLEX + EXT (CPT=72114)     LOCATION:  [facility]     TECHNIQUE:  AP, lateral, obliques, coned down view, and flexion/extension views of the spine were obtained.     COMPARISON:  External Exams, MR, MRI LUMBAR SPINE W WO CONTRAST, 6/09/2021, 8:32 AM.  [facility], XR, XR LUMBAR SPINE (MIN 4 VIEWS) (CPT=72110), 5/10/2021, 10:34 AM.     INDICATIONS:  M54.50 Lumbar back pain M54.6 Midline thoracic back pain, unspecified chronicity     PATIENT STATED HISTORY: (As transcribed by Technologist)   Pain in lower T. Spine and upper L. Spine . She had cement placed in T. Spine which fractured vertebra above and below that area from osteoporosis.         FINDINGS:      BONES:  Decreased bone mineralization limits evaluation of the osseous structures.  Grade 1 anterolisthesis of L4 with respect L5 with degenerative disc disease and facet  joint arthropathy.  Depression of the L1 and L2 superior endplates are consistent  with compression fractures.                   Impression   CONCLUSION:    1. Compression fractures involve the superior L1 and L2 superior endplates not appreciated on most recent lumbar spine radiographs dated 5/10/2021 or MRI of the lumbar spine performed 6/9/2021, correlate clinically.        Dictated by (CST): Roya Marquis MD on 9/12/2022 at 3:10 PM      Finalized by (CST): Roya Marquis MD on 9/12/2022 at 3:12 PM       Result History    XR LUMBAR SPINE COMPLETE W/FLEX + EXT (CPT=72114) (Order #[id number]) on 9/12/2022 - Order Result History Report  Signed by    Signed Time Phone Pager   Roya Marquis MD 9/12/2022 15:12 [phone]        XR LUMBAR SPINE COMPLETE W/FLEX + EXT (CPT=72114): Result Notes     Cassandra Buckner, APRN  9/12/2022  4:19 PM CDT           Lab Results   Component Value Date    WBC 13.8 (H) 11/06/2023    WBC 14.5 (H) 02/15/2021    WBC 16.8 (H) 09/17/2020   No results found for: \"HEMOGLOBIN\"  Lab Results   Component Value Date    .0 11/06/2023    .0 02/15/2021    .0 09/17/2020     Patient Education: Patient was advised to continue normal activities as tolerated and was advised against any form of bedrest, since recent guidelines promote and encourage physical activity for improvment of functionality and overall pain.  (Family Practice Vol. 16, No. 1, 39-55Â© [employer] 1999 ).  Patient educated and verbalized understanding.  Medical Decision Making:   Diagnosis:    Encounter Diagnoses   Name Primary?    Lumbar back pain Yes    Midline thoracic back pain, unspecified chronicity     Age-related osteoporosis with current pathological fracture with delayed healing, subsequent encounter     Other chronic pain     Situational depression     Pathological fracture of other site due to osteoporosis, unspecified osteoporosis type, initial encounter        Impression:   Pat Roach presents  for medication review and follow-up.  Patient continues with mid back and lower back pain.  She feels that her pain has been manageable with her current regimen of gabapentin 100 mg 3 times daily and duloxetine 30 mg in the morning.  Patient states that there are still some activities that aggravate her symptoms but she feels that topical medications have been helpful.  No medical changes since her last visit.      Patient had been on opioids in the past and does not want to use opioid medications for her pain as she had difficulty weaning off of these.  Patient denies any radiating quality to the pain.      Patient does state that the pain that she is having is being modulated with her treatment.  She she feels that her pain is manageable with her current treatments and that the medications improve her quality of life and general mood as well as activities of daily living.  See full plan below..       Total visit time: 20 minutes  More than 50% spent coordinating care, providing patient education, reviewing imaging, discussing conservative vs surgical treatment options and counseling.         Plan:   > Continue duloxetine at 30 mg every day  > Follow-up every 6 months  > Okay to continue Advil dual action as directed on bottle: Patient taking it approximately 5 times per day and gabapentin 100 mg twice daily  >use TENs twice per day   > Continue calcium vitamin D supplementation with reduced dose per oncology: 2/2 breast cancer  > Okay to continue all nonmedication management for thoracic pain  > If thoracic pain becomes unmanageable we will need to update imaging: Patient is not wanting to update at this point  >call for med refills  No orders of the defined types were placed in this encounter.      Meds & Refills for this Visit:  Requested Prescriptions      No prescriptions requested or ordered in this encounter       Imaging & Consults:  None    The patient indicates understanding of these issues and agrees to  the plan.      ID#[id number]

## 2024-06-26 ENCOUNTER — LABORATORY ENCOUNTER (OUTPATIENT)
Dept: LAB | Age: 79
End: 2024-06-26
Attending: FAMILY MEDICINE

## 2024-06-26 DIAGNOSIS — M81.0 AGE-RELATED OSTEOPOROSIS WITHOUT CURRENT PATHOLOGICAL FRACTURE: ICD-10-CM

## 2024-06-26 LAB
ANION GAP SERPL CALC-SCNC: 7 MMOL/L (ref 0–18)
BUN BLD-MCNC: 14 MG/DL (ref 9–23)
CALCIUM BLD-MCNC: 9.9 MG/DL (ref 8.5–10.1)
CHLORIDE SERPL-SCNC: 104 MMOL/L (ref 98–112)
CO2 SERPL-SCNC: 30 MMOL/L (ref 21–32)
CREAT BLD-MCNC: 0.94 MG/DL
EGFRCR SERPLBLD CKD-EPI 2021: 62 ML/MIN/1.73M2 (ref 60–?)
FASTING STATUS PATIENT QL REPORTED: NO
GLUCOSE BLD-MCNC: 98 MG/DL (ref 70–99)
OSMOLALITY SERPL CALC.SUM OF ELEC: 292 MOSM/KG (ref 275–295)
POTASSIUM SERPL-SCNC: 4.4 MMOL/L (ref 3.5–5.1)
PTH-INTACT SERPL-MCNC: 24.3 PG/ML (ref 18.5–88)
SODIUM SERPL-SCNC: 141 MMOL/L (ref 136–145)
VIT D+METAB SERPL-MCNC: 32.6 NG/ML (ref 30–100)

## 2024-06-26 PROCEDURE — 80048 BASIC METABOLIC PNL TOTAL CA: CPT

## 2024-06-26 PROCEDURE — 82306 VITAMIN D 25 HYDROXY: CPT

## 2024-06-26 PROCEDURE — 36415 COLL VENOUS BLD VENIPUNCTURE: CPT

## 2024-06-26 PROCEDURE — 84080 ASSAY ALKALINE PHOSPHATASES: CPT

## 2024-06-26 PROCEDURE — 83970 ASSAY OF PARATHORMONE: CPT

## 2024-06-28 LAB — ALKALINE PHOSPHATASE BONE SPECIFIC: 11 UG/L

## 2024-07-02 NOTE — TELEPHONE ENCOUNTER
Prolia Re-verification    Date of last injection: 1/26  Next injection date:7/30  Dental Clearance within last calendar year: Dr. Garg was ok with patient not having dental clearance  Labs ordered: labs done and waiting   [employer] Benefits Packet: Medicare with Supplement, full coverage  PA needed?:NO- Medicare with Supplement    Patient had labs done- routed for review

## 2024-07-08 DIAGNOSIS — M54.50 LUMBAR BACK PAIN: ICD-10-CM

## 2024-07-08 DIAGNOSIS — M54.6 MIDLINE THORACIC BACK PAIN, UNSPECIFIED CHRONICITY: ICD-10-CM

## 2024-07-08 DIAGNOSIS — M80.00XG AGE-RELATED OSTEOPOROSIS WITH CURRENT PATHOLOGICAL FRACTURE WITH DELAYED HEALING, SUBSEQUENT ENCOUNTER: ICD-10-CM

## 2024-07-08 RX ORDER — DULOXETIN HYDROCHLORIDE 30 MG/1
30 CAPSULE, DELAYED RELEASE ORAL DAILY
Qty: 30 CAPSULE | Refills: 2 | Status: SHIPPED | OUTPATIENT
Start: 2024-07-08

## 2024-07-08 RX ORDER — GABAPENTIN 100 MG/1
100 CAPSULE ORAL 3 TIMES DAILY
Qty: 90 CAPSULE | Refills: 0 | Status: SHIPPED | OUTPATIENT
Start: 2024-07-08

## 2024-07-08 NOTE — TELEPHONE ENCOUNTER
Medication: gabapentin 100 MG     Date of last refill: 06/06/24      Medication: DULoxetine 30 MG     Date of last refill: 04/09/24      Last office visit: 06/11/24 virtual  Due back to clinic per last office note:   6 months  Date next office visit scheduled:  12/16/24        Last OV note recommendation:   Plan:   > Continue duloxetine at 30 mg every day  > Follow-up every 6 months  > Okay to continue Advil dual action as directed on bottle: Patient taking it approximately 5 times per day and gabapentin 100 mg twice daily  >use TENs twice per day   > Continue calcium vitamin D supplementation with reduced dose per oncology: 2/2 breast cancer  > Okay to continue all nonmedication management for thoracic pain  > If thoracic pain becomes unmanageable we will need to update imaging: Patient is not wanting to update at this point  >call for med refills  No orders of the defined types were placed in this encounter.

## 2024-07-08 NOTE — TELEPHONE ENCOUNTER
Patient calling to request refill of DULoxetine 30 MG Oral Cap DR Particles AND gabapentin 100 MG Oral Cap   Pharmacy [id number] - [place], [place] - [address] AT [facility] OF [facility] [facility] [address] & [address]), [phone], [phone]     Patient informed of 48 hour refill policy excluding weekends and holidays.   Informed patient prescription is sent directly to pharmacy.    Further explained patient will not receive a call back once prescription is ready.

## 2024-07-10 ENCOUNTER — HOSPITAL ENCOUNTER (OUTPATIENT)
Dept: ULTRASOUND IMAGING | Age: 79
Discharge: HOME | End: 2024-07-10
Attending: STUDENT IN AN ORGANIZED HEALTH CARE EDUCATION/TRAINING PROGRAM
Payer: MEDICARE

## 2024-07-10 DIAGNOSIS — E04.2 MULTINODULAR GOITER: ICD-10-CM

## 2024-07-10 PROCEDURE — 76536 US EXAM OF HEAD AND NECK: CPT | Performed by: STUDENT IN AN ORGANIZED HEALTH CARE EDUCATION/TRAINING PROGRAM

## 2024-07-19 NOTE — TELEPHONE ENCOUNTER
Phoned patient to review benefits/get update on dentist per Dr. Marino. No answer, LMTCB per CARTER.

## 2024-07-23 NOTE — TELEPHONE ENCOUNTER
Patient phoned back and reviewed [employer] benefits.     Patient has not seen her Dentist in a while, but RN reviewed that she would need to make them aware that she is on Prolia before doing any dental work.    Patient wears dentures.     Patient verbalized understanding of all.    Patient has had labs and US done, resulted in EPIC    Routed for review

## 2024-07-23 NOTE — TELEPHONE ENCOUNTER
Reviewed labs- vitamin D and calcium stable for Prolia.   Reviewed thyroid US- multiple spongiform (lower risk) nodules. No need for biopsy at this time. We can discuss these results further at her 7/30 visit. Thanks!

## 2024-07-23 NOTE — TELEPHONE ENCOUNTER
RN phoned patient to discuss below    Patient verbalized understanding.    Patient good to go for Prolia on 7/30

## 2024-07-30 ENCOUNTER — OFFICE VISIT (OUTPATIENT)
Facility: CLINIC | Age: 79
End: 2024-07-30
Payer: MEDICARE

## 2024-07-30 VITALS
SYSTOLIC BLOOD PRESSURE: 134 MMHG | HEIGHT: 61 IN | BODY MASS INDEX: 26.81 KG/M2 | WEIGHT: 142 LBS | DIASTOLIC BLOOD PRESSURE: 76 MMHG | OXYGEN SATURATION: 95 % | HEART RATE: 107 BPM

## 2024-07-30 DIAGNOSIS — E04.2 MULTINODULAR GOITER: ICD-10-CM

## 2024-07-30 DIAGNOSIS — M81.8 OTHER OSTEOPOROSIS WITHOUT CURRENT PATHOLOGICAL FRACTURE: Primary | ICD-10-CM

## 2024-07-30 DIAGNOSIS — M81.0 AGE-RELATED OSTEOPOROSIS WITHOUT CURRENT PATHOLOGICAL FRACTURE: ICD-10-CM

## 2024-07-30 PROCEDURE — 99214 OFFICE O/P EST MOD 30 MIN: CPT | Performed by: STUDENT IN AN ORGANIZED HEALTH CARE EDUCATION/TRAINING PROGRAM

## 2024-07-30 PROCEDURE — 96372 THER/PROPH/DIAG INJ SC/IM: CPT | Performed by: STUDENT IN AN ORGANIZED HEALTH CARE EDUCATION/TRAINING PROGRAM

## 2024-07-30 NOTE — PROGRESS NOTES
Endocrinology Clinic Note    Name: Pat Roach  Date: 7/30/2024      HISTORY OF PRESENT ILLNESS   Pat Roach is a 78 year old female with PMHx significant for chronic back pain, hx of mult vertebral compression fx, osteoporosis, PMR, hx of breast CA who presents for endocrine consultation for osteoporosis    Initial HPI consult in June 2023  Previously with Dr Galan (Clive penny) for eval of mult vertebral compression fx (T7, T8, T11, T12) s/p kyphoplasty.  Completed  one yr of Tymlos (11/2021 - 9/2022), then due to insurance change, was planning to switch to Prolia afterwards, but has not done so yet.      Pt continues to follow up with pain management for back pain  Takes combo Ca+Vit D two tablets a day. Was told by her oncologist recently that her Ca is too high and needs to lower Ca.    Pt endorses Hx of chronic steroids >3 months, Hx of treatment for osteoporosis, Hx of vitamin D deficiency, Regular exercise and Dietary Ca intake   Chronic prednisone for PMR  Previously on chronic opioids for back pain; now on duloxetine, gabapentin, advil for pain control  Pt denies Hx of kidney stones, Hx of hyperTH, Hx of hyperPTH and Hx of hypogonadism  Menopause age : early 50s    8/25/21 DXA: Lumbar T score -4.4, Hip T -2.8, Fem neck T -2.9    Pt used to follow with PT and knows about exercises to do  Is very careful about falls precaution.    Jan 2024 6/2023 - thyroid US shows MNG with all 4 nodules not meeting FNA criteria  6/2023 DXA: Lumbar T score -3.2, Hip T -2.5, Fem neck T -2.9  7/6/23 - Prolia #1 given, no hypoCa afterwards  Due for Prolia #2, pt pushing it back slightly due to very cold weather  1/2024 - BMP and vit d at goal    July 2024 6/2024 Ca 9.9, egfr 62, BSAP 11, PTH 24, vitamin D 32  7/2024  Thyroid US with multiple spongiform TR 1 nodules  Denies falls/fractures since LOV  Continued on vitamin D and calcium combination supplement  Denies compressive symptoms or symptoms of hyperthyroidism     PAST  MEDICAL HISTORY:   Past Medical History:    Amastia    Compression fracture of T9 vertebra (HCC)    Lobular carcinoma of breast (HCC)    Dx/ 9/2015. LEFT. T1c,N0,M0. Lobular. ER+,OR+,Her2/Dania-. Oncotype Dx. Risk = 10%. Rx. MRM > Femara.  Dx. 9/2015. RIGHT.T1c,N0,M0. Lobular + LCIS, ER+,OR+,Her2/Daina-. Oncotype Dx. 11% risk. Rx. MRM + SND > Femara.  Dx/ 9/2015. LEFT. T1c,N0,M0. Lobular. ER+,OR+,Her2/Dania-. Oncotype Dx. Risk = 10%. Rx. MRM > Femara.    Malignant neoplasm of breast (HCC)    Midline thoracic back pain    S/P bilateral mastectomy    S/P breast reconstruction, bilateral    Situational anxiety    Situational depression    Symptomatic menopausal or female climacteric states       PAST SURGICAL HISTORY:   Past Surgical History:   Procedure Laterality Date    Appendectomy  10/2023    w perf and peritonitis    Excis lumbar disk,one level         CURRENT MEDICATIONS:    Current Outpatient Medications   Medication Sig Dispense Refill    DULoxetine 30 MG Oral Cap DR Particles Take 1 capsule (30 mg total) by mouth daily. 30 capsule 2    gabapentin 100 MG Oral Cap Take 1 capsule (100 mg total) by mouth 3 (three) times daily. 90 capsule 0    Calcium Carb-Cholecalciferol (OS-ADITHYA CALCIUM + D3) 500-5 MG-MCG Oral Tab Take by mouth.      denosumab 60 MG/ML Subcutaneous Solution Prefilled Syringe Inject 1 mL (60 mg total) into the skin one time.      Calcium Carbonate-Vitamin D (CALCIUM + D OR) Take 1 tablet by mouth 2 (two) times daily with meals.      Compound Medication Apply 1 g topically 4 (four) times daily as needed. Compound Cream: Diclofenac Sodium 3%, Cyclobenzaprine HCL 2%, Lidocaine HCL 2% (15 g tube) 1 each 0    melatonin 3 MG Oral Tab Take 1 tablet (3 mg total) by mouth nightly.       Endocrine Medications            denosumab 60 MG/ML Subcutaneous Solution Prefilled Syringe            ALLERGIES:  No Known Allergies    SOCIAL HISTORY:    Social History     Socioeconomic History    Marital status:     Tobacco Use    Smoking status: Every Day     Current packs/day: 1.00     Average packs/day: 1 pack/day for 56.0 years (56.0 ttl pk-yrs)     Types: Cigarettes    Smokeless tobacco: Never   Vaping Use    Vaping status: Never Used   Substance and Sexual Activity    Alcohol use: Never    Drug use: Never       FAMILY HISTORY:   No family history on file.      REVIEW OF SYSTEMS:  Ten point review of systems has been performed and is otherwise negative and/or non-contributory, except as described above.      PHYSICAL EXAM:   Vitals:    07/30/24 1234   BP: 134/76   Pulse: 107   SpO2: 95%   Weight: 142 lb (64.4 kg)   Height: 5' 1\" (1.549 m)       BMI: Body mass index is 26.83 kg/m².     CONSTITUTIONAL:  awake, alert, cooperative, no apparent distress, and appears stated age  PSYCH: normal affect  EYES:  No proptosis,  conjunctiva normal  ENT:  Normocephalic, atraumatic   NECK:  Supple, symmetrical, (+) R thyroid nodule  LUNGS: breathing comfortably  CARDIOVASCULAR:  regular rate       DATA:   Pertinent data reviewed      ASSESSMENT AND PLAN:      ICD-10-CM    1. Other osteoporosis without current pathological fracture  M81.8 Renal Function Panel     Vitamin D      2. Age-related osteoporosis without current pathological fracture  M81.0       3. Multinodular goiter  E04.2          (M81.0) Age-related osteoporosis without current pathological fracture  (primary encounter diagnosis)  (M80.80XA) Other osteoporosis with current pathological fracture, initial encounter  (M81.8) Other osteoporosis without current pathological fracture  Plan:   Pt with mult T spine compression fx and DXA with significant osteopenia (lowest T score -4.4 in Aug 2021). Completed 1 yr of Tymlos (Nov 20210306-5837), now ready to resume anti-resorptive treatment. Previous endo completed secondary workup. Pt does have hx of chronic opioid and steroid medication.   Prolia #1 7/6/23  Prolia #2 1/26/2024  Prolia #3 7/30/2024; labs ordered by Dr. Garg, reviewed  with patient today     - continue falls precaution and weight-bearing exercises as able, diet and exercise   - continue Ca and vit D supplementation as appropriate  - Dental clearance not mandated; pt is mostly adentulous  - Verbalized understanding of risks and benefits   - will follow up DXA in 1 year to determine continuation or change in therapy        (E04.2) Multinodular goiter  Plan: incidentally found on physical exam in 2023; 6/2023 thyroid US with 4 nodules not meeting FNA criteria and 7/2024 Thryoid US with multiple stable TR1 nodules   - since stable, only periodic surveillance if patient with symptoms    Return to Clinic in 6 months for Prolia #4      7/30/2024  Apolonia Marino DO

## 2024-07-30 NOTE — PATIENT INSTRUCTIONS
Return Visit   [ x ] Physician in 6 months   [  ] In person or video visit  [  ] In person only    [ x ] After visit summary   [ x ] Placed labs/imaging. Labs are to be drawn at 8A and fasting.       It was great seeing you today!    Today we discussed your osteoporosis and thyroid nodules:  -Please keep me updated if you note any neck symptoms  -You will receive Prolia #3 today  -Please complete your labs prior to follow up in 6 months   -Continue current calcium and vitamin d supplement     Take care!  -Dr. Marino

## 2024-07-30 NOTE — PROGRESS NOTES
Patient in office for Prolia administration.    Reviewed Prolia administration, possible side effects. Reviewed to call office if any side effects post administration.     Reviewed patient should inform Dentist if any dental work to be done. No dental work currently scheduled.     Administered Prolia 60mg in Left upper arm subcutaneously at 1300. Patient tolerated well with no complaints.    Next office visit 1/31/2025    Reminder sent to pool for re-verification, will come up on 12/31/2024

## 2024-08-06 DIAGNOSIS — M54.50 LUMBAR BACK PAIN: ICD-10-CM

## 2024-08-06 DIAGNOSIS — M54.6 MIDLINE THORACIC BACK PAIN, UNSPECIFIED CHRONICITY: ICD-10-CM

## 2024-08-06 RX ORDER — GABAPENTIN 100 MG/1
100 CAPSULE ORAL 3 TIMES DAILY
Qty: 90 CAPSULE | Refills: 0 | Status: SHIPPED | OUTPATIENT
Start: 2024-08-06

## 2024-08-06 NOTE — TELEPHONE ENCOUNTER
Patient calling to request refill of gabapentin 100 MG Oral Cap   Pharmacy   [facility] #[id number] - [place], [place] - [address] AT [facility] OF [address] [facility] ([address]), [phone], [phone]       Patient informed of 48 hour refill policy excluding weekends and holidays.   Informed patient prescription is sent directly to pharmacy.    Further explained patient will not receive a call back once prescription is ready.

## 2024-08-06 NOTE — TELEPHONE ENCOUNTER
Medication: Gabapentin 100 oral cap    Date of last refill: 7/08/24    Last office visit: 6/11/24  Due back to clinic per last office note:  12/11/24  Date next office visit scheduled:  12/16/24    Last OV note recommendation:   Impression:   Pat Roach presents for medication review and follow-up.  Patient continues with mid back and lower back pain.  She feels that her pain has been manageable with her current regimen of gabapentin 100 mg 3 times daily and duloxetine 30 mg in the morning.  Patient states that there are still some activities that aggravate her symptoms but she feels that topical medications have been helpful.  No medical changes since her last visit.        Patient had been on opioids in the past and does not want to use opioid medications for her pain as she had difficulty weaning off of these.  Patient denies any radiating quality to the pain.       Patient does state that the pain that she is having is being modulated with her treatment.  She she feels that her pain is manageable with her current treatments and that the medications improve her quality of life and general mood as well as activities of daily living.  See full plan below..         Total visit time: 20 minutes  More than 50% spent coordinating care, providing patient education, reviewing imaging, discussing conservative vs surgical treatment options and counseling.           Plan:   > Continue duloxetine at 30 mg every day  > Follow-up every 6 months  > Okay to continue Advil dual action as directed on bottle: Patient taking it approximately 5 times per day and gabapentin 100 mg twice daily  >use TENs twice per day   > Continue calcium vitamin D supplementation with reduced dose per oncology: 2/2 breast cancer  > Okay to continue all nonmedication management for thoracic pain  > If thoracic pain becomes unmanageable we will need to update imaging: Patient is not wanting to update at this point  >call for med refills  No orders of  the defined types were placed in this encounter.

## 2024-09-09 DIAGNOSIS — M54.50 LUMBAR BACK PAIN: ICD-10-CM

## 2024-09-09 DIAGNOSIS — M54.6 MIDLINE THORACIC BACK PAIN, UNSPECIFIED CHRONICITY: ICD-10-CM

## 2024-09-09 RX ORDER — GABAPENTIN 100 MG/1
100 CAPSULE ORAL 3 TIMES DAILY
Qty: 90 CAPSULE | Refills: 0 | Status: SHIPPED | OUTPATIENT
Start: 2024-09-09

## 2024-09-09 NOTE — TELEPHONE ENCOUNTER
Medication: gabapentin 100 MG     Date of last refill: 08/06/24      Last office visit: 06/11/24 virtual  Due back to clinic per last office note:  6 months  Date next office visit scheduled:  12/16/24        Last OV note recommendation:   Plan:   > Continue duloxetine at 30 mg every day  > Follow-up every 6 months  > Okay to continue Advil dual action as directed on bottle: Patient taking it approximately 5 times per day and gabapentin 100 mg twice daily  >use TENs twice per day   > Continue calcium vitamin D supplementation with reduced dose per oncology: 2/2 breast cancer  > Okay to continue all nonmedication management for thoracic pain  > If thoracic pain becomes unmanageable we will need to update imaging: Patient is not wanting to update at this point  >call for med refills

## 2024-09-09 NOTE — TELEPHONE ENCOUNTER
Patient calling to request refill of gabapentin 100 MG Oral Cap   Pharmacy   [id number] - [place], [place] - [address] AT [facility] OF [facility] [address] [facility] [address] [facility] [address]), [phone], [phone]       Patient informed of 48 hour refill policy excluding weekends and holidays.   Informed patient prescription is sent directly to pharmacy.    Further explained patient will not receive a call back once prescription is ready.

## 2024-10-07 DIAGNOSIS — M54.50 LUMBAR BACK PAIN: ICD-10-CM

## 2024-10-07 DIAGNOSIS — M80.00XG AGE-RELATED OSTEOPOROSIS WITH CURRENT PATHOLOGICAL FRACTURE WITH DELAYED HEALING, SUBSEQUENT ENCOUNTER: ICD-10-CM

## 2024-10-07 DIAGNOSIS — M54.6 MIDLINE THORACIC BACK PAIN, UNSPECIFIED CHRONICITY: ICD-10-CM

## 2024-10-07 RX ORDER — DULOXETIN HYDROCHLORIDE 30 MG/1
30 CAPSULE, DELAYED RELEASE ORAL DAILY
Qty: 30 CAPSULE | Refills: 2 | Status: SHIPPED | OUTPATIENT
Start: 2024-10-07

## 2024-10-07 NOTE — TELEPHONE ENCOUNTER
Medication: DULoxetine 30 MG     Date of last refill: 07/08/24      Last office visit: 06/11/24  Due back to clinic per last office note:  6 months  Date next office visit scheduled:  12/16/24        Last OV note recommendation:   Plan:   > Continue duloxetine at 30 mg every day  > Follow-up every 6 months  > Okay to continue Advil dual action as directed on bottle: Patient taking it approximately 5 times per day and gabapentin 100 mg twice daily  >use TENs twice per day   > Continue calcium vitamin D supplementation with reduced dose per oncology: 2/2 breast cancer  > Okay to continue all nonmedication management for thoracic pain  > If thoracic pain becomes unmanageable we will need to update imaging: Patient is not wanting to update at this point  >call for med refills  No orders of the defined types were placed in this encounter.

## 2024-10-11 DIAGNOSIS — M54.50 LUMBAR BACK PAIN: ICD-10-CM

## 2024-10-11 DIAGNOSIS — M54.6 MIDLINE THORACIC BACK PAIN, UNSPECIFIED CHRONICITY: ICD-10-CM

## 2024-10-11 RX ORDER — GABAPENTIN 100 MG/1
100 CAPSULE ORAL 3 TIMES DAILY
Qty: 90 CAPSULE | Refills: 0 | Status: SHIPPED | OUTPATIENT
Start: 2024-10-11

## 2024-10-11 NOTE — TELEPHONE ENCOUNTER
Medication:   gabapentin 100 MG Oral Cap     Date of last refill: 9/9/24    Last office visit: 6/11/24 Virtual  Due back to clinic per last office note:  6 month  Date next office visit scheduled:  12/16/24  Last OV note recommendation: Plan:   > Continue duloxetine at 30 mg every day  > Follow-up every 6 months  > Okay to continue Advil dual action as directed on bottle: Patient taking it approximately 5 times per day and gabapentin 100 mg twice daily  >use TENs twice per day   > Continue calcium vitamin D supplementation with reduced dose per oncology: 2/2 breast cancer  > Okay to continue all nonmedication management for thoracic pain  > If thoracic pain becomes unmanageable we will need to update imaging: Patient is not wanting to update at this point  >call for med refills  No orders of the defined types were placed in this encounter.

## 2024-11-13 DIAGNOSIS — M54.6 MIDLINE THORACIC BACK PAIN, UNSPECIFIED CHRONICITY: ICD-10-CM

## 2024-11-13 DIAGNOSIS — M54.50 LUMBAR BACK PAIN: ICD-10-CM

## 2024-11-13 NOTE — TELEPHONE ENCOUNTER
Patient calling to request refill of GABAPENTIN 100 MG Oral Cap   Pharmacy [facility] #[id number] - [place], [place] - [address] AT [facility] [address] [facility] [address] [facility] ([address]), [phone], [phone]     Patient informed of 48 hour refill policy excluding weekends and holidays.   Informed patient prescription is sent directly to pharmacy.    Further explained patient will not receive a call back once prescription is ready.

## 2024-11-14 RX ORDER — GABAPENTIN 100 MG/1
100 CAPSULE ORAL 3 TIMES DAILY
Qty: 90 CAPSULE | Refills: 0 | Status: SHIPPED | OUTPATIENT
Start: 2024-11-14

## 2024-11-14 NOTE — TELEPHONE ENCOUNTER
Medication: GABAPENTIN 100 MG Oral Cap     Date of last refill: 10/11/2024  [place] 10/11/2024    Last office visit: Televisit on 6/11/2024  Due back to clinic per last office note:  6 months  Date next office visit scheduled:  12/16/2024        Last OV note recommendationPlan:   > Continue duloxetine at 30 mg every day  > Follow-up every 6 months  > Okay to continue Advil dual action as directed on bottle: Patient taking it approximately 5 times per day and gabapentin 100 mg twice daily  >use TENs twice per day   > Continue calcium vitamin D supplementation with reduced dose per oncology: 2/2 breast cancer  > Okay to continue all nonmedication management for thoracic pain  > If thoracic pain becomes unmanageable we will need to update imaging: Patient is not wanting to update at this point  >call for med refills  No orders of the defined types were placed in this encounter.

## 2024-12-04 ENCOUNTER — PATIENT OUTREACH (OUTPATIENT)
Dept: FAMILY MEDICINE CLINIC | Facility: CLINIC | Age: 79
End: 2024-12-04

## 2024-12-16 ENCOUNTER — VIRTUAL PHONE E/M (OUTPATIENT)
Dept: PAIN CLINIC | Facility: CLINIC | Age: 79
End: 2024-12-16
Payer: MEDICARE

## 2024-12-16 DIAGNOSIS — M54.6 MIDLINE THORACIC BACK PAIN, UNSPECIFIED CHRONICITY: ICD-10-CM

## 2024-12-16 DIAGNOSIS — G89.29 OTHER CHRONIC PAIN: ICD-10-CM

## 2024-12-16 DIAGNOSIS — M80.00XA PATHOLOGICAL FRACTURE OF OTHER SITE DUE TO OSTEOPOROSIS, UNSPECIFIED OSTEOPOROSIS TYPE, INITIAL ENCOUNTER: ICD-10-CM

## 2024-12-16 DIAGNOSIS — M54.50 LUMBAR BACK PAIN: Primary | ICD-10-CM

## 2024-12-16 RX ORDER — GABAPENTIN 100 MG/1
100 CAPSULE ORAL 3 TIMES DAILY
Qty: 90 CAPSULE | Refills: 2 | Status: SHIPPED | OUTPATIENT
Start: 2024-12-16

## 2024-12-16 NOTE — PROGRESS NOTES
HPI:   Pat Roach presents for f/u: she was last seen telephone visit on July 11, 2024.  She has good and bad days/feels overall that her pain is well-managed.  She reports that it is worse in the morning and it is provoked by activities most recently for example [place] shopping carrying packages can exacerbate her pain.  She uses her gabapentin 100 mg 3 times daily, duloxetine 30 mg daily along with Advil dual action as far as medications.  She also uses her TENS unit or heat and lumbar support for increased activities such as cooking and feels that that is helpful as well.      She denies any fever chills falls injuries or events.  She denies any worsening of her pain symptoms that would indicate a need for updated imaging.      She feels primary pain in mid back and lower back.    She continues working with endocrinology on her osteoporosis    She has been increased cymbalta to 30 mg daily to help with her pain and her mood/she feels this is helpful.        Patient presents with complaints of thoracic pain just below the bra line. Patient stated that the pain is manageable with all of her current treatment.  She is able to do activities of daily living.        Patient continues using Advil dual action, gabapentin 100 mg 3 times daily along with the duloxetine 30 mg in the morning as she feels it does give her energy.    Additional nonprescription treatments include application of pain patches, topical creams and  TENS unit which is helping her pain.  Injection therapy did not resolve her symptoms.    Patient had been on opioids in the past and does not want to use opioid medications for her pain as she had difficulty weaning off of these.  Patient denies any radiating quality to the pain.    Patient does use a soft brace as needed if she does need to do things such as the dishes or cooking or standing at a counter.      The pain is described as mild aching, grabbing, soreness that is constant .  The  patient’s activity level has increased since last visit.  The pain is worst in the early morning.  And related to activities    The following activities will increase the patient’s pain: Standing walking grocery shopping doing the dishes cooking standing at a counter. Standing too long, frying caro but she feels it is doable/manageable.    The following activities decrease the patient’s pain: medications, limiting activity level, changing position, Wearing a brace using a TENS unit    Functional Assessment: Patient reports that they are able to complete all of their ADL's such as eating, bathing, using the toilet, dressing and getting up from a bed or a chair independently.     Patient's  does help her at times    Current Medications:  Current Outpatient Medications   Medication Sig Dispense Refill    gabapentin 100 MG Oral Cap Take 1 capsule (100 mg total) by mouth 3 (three) times daily. 90 capsule 0    DULoxetine 30 MG Oral Cap DR Particles Take 1 capsule (30 mg total) by mouth daily. 30 capsule 2    Calcium Carb-Cholecalciferol (OS-ADITHYA CALCIUM + D3) 500-5 MG-MCG Oral Tab Take by mouth.      denosumab 60 MG/ML Subcutaneous Solution Prefilled Syringe Inject 1 mL (60 mg total) into the skin one time.      Calcium Carbonate-Vitamin D (CALCIUM + D OR) Take 1 tablet by mouth 2 (two) times daily with meals.      Compound Medication Apply 1 g topically 4 (four) times daily as needed. Compound Cream: Diclofenac Sodium 3%, Cyclobenzaprine HCL 2%, Lidocaine HCL 2% (15 g tube) 1 each 0    melatonin 3 MG Oral Tab Take 1 tablet (3 mg total) by mouth nightly.        Patient requires assistance with: No assistance required  Have you recently had any feelings of harming yourself or others? The patient's response was no.  However patient reports that her mood has been affected by general pain complaints    Physical Exam:   There were no vitals taken for this visit. phone visit  VAS Pain Score: 2/10 manageable  level  Radiology/Lab Test Reviewed:   Last MRI in the system reviewed from 2022 September   Show images for XR THORACIC SPINE (3 VIEWS) (CPT=72072)  Study Result    Narrative   PROCEDURE:  XR THORACIC SPINE (3 VIEWS) (CPT=72072)     LOCATION:                                           TECHNIQUE:  AP, lateral, and swimmer's views of the thoracic spine were obtained.     COMPARISON:  [facility], MR, MRI THORACIC SPINE W WO CONTRAST, 6/09/2021, 9:03 AM.  [place], XR, XR LUMBAR SPINE COMPLETE W/FLEX + EXT (CPT=72114), 9/12/2022, 1:23 PM.  [place], XR, XR THORACIC SPINE (3 VIEWS) (CPT=72072), 8/31/2021, 4:35 PM.     INDICATIONS:  M54.6 Midline thoracic back pain, unspecified chronicity     PATIENT STATED HISTORY: (As transcribed by Technologist)  Pain in lower T spine and upper L.Spine  She had cement placed in T Spine and the vertebra were fractured above and below that area.         FINDINGS:      BONES:  Decreased bone mineralization limits evaluation of the osseous structures.  Re-identified are multilevel compression fractures including T8, T11 and T12 with post vertebroplasty changes involving approximately the T9 vertebral body.  Mild  multilevel degenerative disc disease.  PARASPINOUS:  No paraspinous abnormality is seen.                   Impression   CONCLUSION:    1. Stable findings when compared to most recent thoracic spine radiographs dating back to 8/31/2021 as detailed above.        Dictated by (CST): Roya Marquis MD on 9/12/2022 at 3:00 PM      Finalized by (CST): Roya Marquis MD on 9/12/2022 at 4:02 PM     PROCEDURE:  XR LUMBAR SPINE COMPLETE W/FLEX + EXT (CPT=72114)     LOCATION:  [facility]     TECHNIQUE:  AP, lateral, obliques, coned down view, and flexion/extension views of the spine were obtained.     COMPARISON:  External Exams, MR, MRI LUMBAR SPINE W WO CONTRAST, 6/09/2021, 8:32 AM.  [facility], XR, XR LUMBAR SPINE (MIN 4 VIEWS) (CPT=72110), 5/10/2021, 10:34 AM.     INDICATIONS:   M54.50 Lumbar back pain M54.6 Midline thoracic back pain, unspecified chronicity     PATIENT STATED HISTORY: (As transcribed by Technologist)   Pain in lower T. Spine and upper L. Spine . She had cement placed in T. Spine which fractured vertebra above and below that area from osteoporosis.         FINDINGS:      BONES:  Decreased bone mineralization limits evaluation of the osseous structures.  Grade 1 anterolisthesis of L4 with respect L5 with degenerative disc disease and facet joint arthropathy.  Depression of the L1 and L2 superior endplates are consistent  with compression fractures.                   Impression   CONCLUSION:    1. Compression fractures involve the superior L1 and L2 superior endplates not appreciated on most recent lumbar spine radiographs dated 5/10/2021 or MRI of the lumbar spine performed 6/9/2021, correlate clinically.        Dictated by (CST): Roya Marquis MD on 9/12/2022 at 3:10 PM      Finalized by (CST): Roya Marquis MD on 9/12/2022 at 3:12 PM       Result History    XR LUMBAR SPINE COMPLETE W/FLEX + EXT (CPT=72114) (Order #[id number]) on 9/12/2022 - Order Result History Report  Signed by    Signed Time Phone Pager   Roya Marquis MD 9/12/2022 15:12 [phone]        XR LUMBAR SPINE COMPLETE W/FLEX + EXT (CPT=72114): Result Notes     GERARD Long  9/12/2022  4:19 PM CDT           Lab Results   Component Value Date    WBC 13.8 (H) 11/06/2023    WBC 14.5 (H) 02/15/2021    WBC 16.8 (H) 09/17/2020   No results found for: \"HEMOGLOBIN\"  Lab Results   Component Value Date    .0 11/06/2023    .0 02/15/2021    .0 09/17/2020     Patient Education: Patient was advised to continue normal activities as tolerated and was advised against any form of bedrest, since recent guidelines promote and encourage physical activity for improvment of functionality and overall pain.  (Family Practice Vol. 16, No. 1, 50-36Â© [employer] 1999 ).  Patient educated and  verbalized understanding.  Medical Decision Making:   Diagnosis:    Encounter Diagnoses   Name Primary?    Lumbar back pain Yes    Midline thoracic back pain, unspecified chronicity     Pathological fracture of other site due to osteoporosis, unspecified osteoporosis type, initial encounter     Other chronic pain        Impression:   Pat Roach presents for medication review and follow-up.  Patient continues with mid back and lower back pain.  She feels that her pain has been manageable with her current regimen of gabapentin 100 mg 3 times daily and duloxetine 30 mg in the morning as well as over-the-counter Advil dual action.  Patient states that there are still some activities that aggravate her symptoms but she feels that topical medications have been helpful.  No medical changes since her last visit.  No falls injuries or events or increased in her pain symptoms that would indicate the need for updated x-rays.    Patient reports that she sleeps well.    Patient had been on opioids in the past and does not want to use opioid medications for her pain as she had difficulty weaning off of these.  Patient denies any radiating quality to the pain.      Patient does state that the pain that she is having is being modulated with her treatment.  She she feels that her pain is manageable with her current treatments and that the medications improve her quality of life and general mood as well as activities of daily living.  See full plan below..       Total visit time: 20 minutes  More than 50% spent coordinating care, providing patient education, reviewing imaging, discussing conservative vs surgical treatment options and counseling.         Plan:   > Continue duloxetine at 30 mg every day: Does not need refills today  > Follow-up every 6 months for evaluation and medication management  > Refill gabapentin 100 mg 3 times daily #90 with 2 refills today  > Okay to continue Advil dual action as directed on bottle: Patient  taking it approximately 5 times per day and gabapentin 100 mg twice daily  >use TENs twice per day   > Continue calcium vitamin D supplementation with reduced dose per oncology: 2/2 breast cancer  > Okay to continue all nonmedication management for thoracic and lumbar pain  > If thoracic or lumbar pain becomes unmanageable we will need to update imaging: Patient is not wanting to update at this point  >call for med refills  No orders of the defined types were placed in this encounter.      Meds & Refills for this Visit:  Requested Prescriptions     Signed Prescriptions Disp Refills    gabapentin 100 MG Oral Cap 90 capsule 2     Sig: Take 1 capsule (100 mg total) by mouth 3 (three) times daily.       Imaging & Consults:  None    The patient indicates understanding of these issues and agrees to the plan.      ID#[id number]

## 2025-01-04 DIAGNOSIS — M54.50 LUMBAR BACK PAIN: ICD-10-CM

## 2025-01-04 DIAGNOSIS — M80.00XG AGE-RELATED OSTEOPOROSIS WITH CURRENT PATHOLOGICAL FRACTURE WITH DELAYED HEALING, SUBSEQUENT ENCOUNTER: ICD-10-CM

## 2025-01-04 DIAGNOSIS — M54.6 MIDLINE THORACIC BACK PAIN, UNSPECIFIED CHRONICITY: ICD-10-CM

## 2025-01-06 RX ORDER — DULOXETIN HYDROCHLORIDE 30 MG/1
30 CAPSULE, DELAYED RELEASE ORAL DAILY
Qty: 30 CAPSULE | Refills: 2 | Status: SHIPPED | OUTPATIENT
Start: 2025-01-06

## 2025-01-06 NOTE — TELEPHONE ENCOUNTER
Medication: DULoxetine 30 MG Oral Cap DR Reynoso     Date of last refill: 10/7/24      Last office visit: 12/16/24 virtual  Due back to clinic per last office note:  6 months  Date next office visit scheduled:  6/16/25      Last OV note recommendation: Plan:   > Continue duloxetine at 30 mg every day: Does not need refills today  > Follow-up every 6 months for evaluation and medication management  > Refill gabapentin 100 mg 3 times daily #90 with 2 refills today  > Okay to continue Advil dual action as directed on bottle: Patient taking it approximately 5 times per day and gabapentin 100 mg twice daily  >use TENs twice per day   > Continue calcium vitamin D supplementation with reduced dose per oncology: 2/2 breast cancer  > Okay to continue all nonmedication management for thoracic and lumbar pain  > If thoracic or lumbar pain becomes unmanageable we will need to update imaging: Patient is not wanting to update at this point  >call for med refills

## 2025-01-13 ENCOUNTER — TELEPHONE (OUTPATIENT)
Facility: CLINIC | Age: 80
End: 2025-01-13

## 2025-01-13 NOTE — TELEPHONE ENCOUNTER
Patient populated in reminders.    Has upcoming visit with Dr. Marino on 1/31/25, and due for Prolia at that time.    Last Prolia injection: 7/30/24    Patient wears dentures.    Attempt to phone patient to confirm coverage, and both lines just ring and ring. Not able to leave a message, and patient does not use [employer].     Submitted via [employer].

## 2025-01-14 NOTE — TELEPHONE ENCOUNTER
Fax From: [employer]     Summary of Benefits     Prolia Injection  Benefits     Place in [address]

## 2025-01-21 NOTE — TELEPHONE ENCOUNTER
Spoke with patient on telephone and reviewed [employer] benefits, pt verbalized understanding.    States she will get labs done prior to next week visit.     Awaiting lab results.

## 2025-01-24 ENCOUNTER — LABORATORY ENCOUNTER (OUTPATIENT)
Dept: LAB | Age: 80
End: 2025-01-24
Attending: STUDENT IN AN ORGANIZED HEALTH CARE EDUCATION/TRAINING PROGRAM
Payer: MEDICARE

## 2025-01-24 DIAGNOSIS — E78.2 MIXED HYPERLIPIDEMIA: ICD-10-CM

## 2025-01-24 DIAGNOSIS — M81.8 OTHER OSTEOPOROSIS WITHOUT CURRENT PATHOLOGICAL FRACTURE: ICD-10-CM

## 2025-01-24 LAB
ALBUMIN SERPL-MCNC: 4.7 G/DL (ref 3.2–4.8)
ANION GAP SERPL CALC-SCNC: 6 MMOL/L (ref 0–18)
BUN BLD-MCNC: 18 MG/DL (ref 9–23)
CALCIUM BLD-MCNC: 10.3 MG/DL (ref 8.7–10.6)
CHLORIDE SERPL-SCNC: 101 MMOL/L (ref 98–112)
CHOLEST SERPL-MCNC: 225 MG/DL (ref ?–200)
CO2 SERPL-SCNC: 33 MMOL/L (ref 21–32)
CREAT BLD-MCNC: 1.08 MG/DL
EGFRCR SERPLBLD CKD-EPI 2021: 52 ML/MIN/1.73M2 (ref 60–?)
FASTING PATIENT LIPID ANSWER: NO
GLUCOSE BLD-MCNC: 118 MG/DL (ref 70–99)
HDLC SERPL-MCNC: 66 MG/DL (ref 40–59)
LDLC SERPL CALC-MCNC: 130 MG/DL (ref ?–100)
NONHDLC SERPL-MCNC: 159 MG/DL (ref ?–130)
OSMOLALITY SERPL CALC.SUM OF ELEC: 293 MOSM/KG (ref 275–295)
PHOSPHATE SERPL-MCNC: 3.6 MG/DL (ref 2.4–5.1)
POTASSIUM SERPL-SCNC: 4.3 MMOL/L (ref 3.5–5.1)
SODIUM SERPL-SCNC: 140 MMOL/L (ref 136–145)
TRIGL SERPL-MCNC: 164 MG/DL (ref 30–149)
VIT D+METAB SERPL-MCNC: 32.2 NG/ML (ref 30–100)
VLDLC SERPL CALC-MCNC: 30 MG/DL (ref 0–30)

## 2025-01-24 PROCEDURE — 80061 LIPID PANEL: CPT

## 2025-01-24 PROCEDURE — 82306 VITAMIN D 25 HYDROXY: CPT

## 2025-01-24 PROCEDURE — 80069 RENAL FUNCTION PANEL: CPT

## 2025-01-24 PROCEDURE — 36415 COLL VENOUS BLD VENIPUNCTURE: CPT

## 2025-01-31 ENCOUNTER — OFFICE VISIT (OUTPATIENT)
Facility: CLINIC | Age: 80
End: 2025-01-31
Payer: MEDICARE

## 2025-01-31 VITALS
OXYGEN SATURATION: 96 % | DIASTOLIC BLOOD PRESSURE: 76 MMHG | WEIGHT: 142 LBS | HEIGHT: 61 IN | HEART RATE: 102 BPM | BODY MASS INDEX: 26.81 KG/M2 | SYSTOLIC BLOOD PRESSURE: 124 MMHG

## 2025-01-31 DIAGNOSIS — M81.0 AGE-RELATED OSTEOPOROSIS WITHOUT CURRENT PATHOLOGICAL FRACTURE: Primary | ICD-10-CM

## 2025-01-31 PROCEDURE — 99214 OFFICE O/P EST MOD 30 MIN: CPT | Performed by: STUDENT IN AN ORGANIZED HEALTH CARE EDUCATION/TRAINING PROGRAM

## 2025-01-31 PROCEDURE — 96372 THER/PROPH/DIAG INJ SC/IM: CPT | Performed by: STUDENT IN AN ORGANIZED HEALTH CARE EDUCATION/TRAINING PROGRAM

## 2025-01-31 NOTE — PROGRESS NOTES
Patient in office for Prolia #4 administration.    Reviewed Prolia administration, possible side effects. Reviewed to call office if any side effects post administration.     Reviewed patient should inform Dentist if any dental work to be done. No dental work currently scheduled.     Administered Prolia 60mg in left upper arm subcutaneously at 1255. Patient tolerated well with no complaints.    Scheduled next follow up visit with  8/1/25- for next scheduled Prolia injection. Reminder sent to [place] to begin benefits verification 1 month prior.

## 2025-01-31 NOTE — TELEPHONE ENCOUNTER
Patient was in office today 1/31 for Prolia injection.    Next injection scheduled 8/1/25    Documents sent to scanning.    Closing Encounter.

## 2025-01-31 NOTE — PROGRESS NOTES
Endocrinology Clinic Note    Name: Pat Roach  Date: 1/31/2025      HISTORY OF PRESENT ILLNESS   Pat Roach is a 79 year old female with PMHx significant for chronic back pain, hx of mult vertebral compression fx, osteoporosis, PMR, hx of breast CA who presents for endocrine consultation for osteoporosis    Initial HPI consult in June 2023  Previously with Dr Galan (Clive penny) for eval of mult vertebral compression fx (T7, T8, T11, T12) s/p kyphoplasty.  Completed  one yr of Tymlos (11/2021 - 9/2022), then due to insurance change, was planning to switch to Prolia afterwards, but has not done so yet.      Pt continues to follow up with pain management for back pain  Takes combo Ca+Vit D two tablets a day. Was told by her oncologist recently that her Ca is too high and needs to lower Ca.    Pt endorses Hx of chronic steroids >3 months, Hx of treatment for osteoporosis, Hx of vitamin D deficiency, Regular exercise and Dietary Ca intake   Chronic prednisone for PMR  Previously on chronic opioids for back pain; now on duloxetine, gabapentin, advil for pain control  Pt denies Hx of kidney stones, Hx of hyperTH, Hx of hyperPTH and Hx of hypogonadism  Menopause age : early 50s    8/25/21 DXA: Lumbar T score -4.4, Hip T -2.8, Fem neck T -2.9    Pt used to follow with PT and knows about exercises to do  Is very careful about falls precaution.    Jan 2024 6/2023 - thyroid US shows MNG with all 4 nodules not meeting FNA criteria  6/2023 DXA: Lumbar T score -3.2, Hip T -2.5, Fem neck T -2.9  7/6/23 - Prolia #1 given, no hypoCa afterwards  Due for Prolia #2, pt pushing it back slightly due to very cold weather  1/2024 - BMP and vit d at goal    July 2024 6/2024 Ca 9.9, egfr 62, BSAP 11, PTH 24, vitamin D 32  7/2024  Thyroid US with multiple spongiform TR 1 nodules  Denies falls/fractures since LOV  Continued on vitamin D and calcium combination supplement  Denies compressive symptoms or symptoms of hyperthyroidism     Jan  2025 1/24/2025 glucose 118, creatinine 1.08, EGFR 52, total calcium 10.3, phosphorus 3.6, albumin 4.7, vitamin D32.2  Prolia #4 today  Did have a fall when she tripped a few weeks ago, denies fractures since LOV  Continued on vitamin D and calcium combination supplement  Denies compressive symptoms       PAST MEDICAL HISTORY:   Past Medical History:    Amastia    Compression fracture of T9 vertebra (HCC)    Lobular carcinoma of breast (HCC)    Dx/ 9/2015. LEFT. T1c,N0,M0. Lobular. ER+,VA+,Her2/Dania-. Oncotype Dx. Risk = 10%. Rx. MRM > Femara.  Dx. 9/2015. RIGHT.T1c,N0,M0. Lobular + LCIS, ER+,VA+,Her2/Dania-. Oncotype Dx. 11% risk. Rx. MRM + SND > Femara.  Dx/ 9/2015. LEFT. T1c,N0,M0. Lobular. ER+,VA+,Her2/Dania-. Oncotype Dx. Risk = 10%. Rx. MRM > Femara.    Malignant neoplasm of breast (HCC)    Midline thoracic back pain    S/P bilateral mastectomy    S/P breast reconstruction, bilateral    Situational anxiety    Situational depression    Symptomatic menopausal or female climacteric states       PAST SURGICAL HISTORY:   Past Surgical History:   Procedure Laterality Date    Appendectomy  10/2023    w perf and peritonitis    Excis lumbar disk,one level         CURRENT MEDICATIONS:    Current Outpatient Medications   Medication Sig Dispense Refill    DULOXETINE 30 MG Oral Cap DR Particles TAKE 1 CAPSULE(30 MG) BY MOUTH DAILY 30 capsule 2    gabapentin 100 MG Oral Cap Take 1 capsule (100 mg total) by mouth 3 (three) times daily. 90 capsule 2    Calcium Carb-Cholecalciferol (OS-ADITHYA CALCIUM + D3) 500-5 MG-MCG Oral Tab Take by mouth.      denosumab 60 MG/ML Subcutaneous Solution Prefilled Syringe Inject 1 mL (60 mg total) into the skin one time.      Calcium Carbonate-Vitamin D (CALCIUM + D OR) Take 1 tablet by mouth 2 (two) times daily with meals.      Compound Medication Apply 1 g topically 4 (four) times daily as needed. Compound Cream: Diclofenac Sodium 3%, Cyclobenzaprine HCL 2%, Lidocaine HCL 2% (15 g tube) 1 each 0     melatonin 3 MG Oral Tab Take 1 tablet (3 mg total) by mouth nightly.       Endocrine Medications            denosumab 60 MG/ML Subcutaneous Solution Prefilled Syringe            ALLERGIES:  No Known Allergies    SOCIAL HISTORY:    Social History     Socioeconomic History    Marital status:    Tobacco Use    Smoking status: Every Day     Current packs/day: 1.00     Average packs/day: 1 pack/day for 56.0 years (56.0 ttl pk-yrs)     Types: Cigarettes    Smokeless tobacco: Never   Vaping Use    Vaping status: Never Used   Substance and Sexual Activity    Alcohol use: Never    Drug use: Never       FAMILY HISTORY:   History reviewed. No pertinent family history.      REVIEW OF SYSTEMS:  Ten point review of systems has been performed and is otherwise negative and/or non-contributory, except as described above.      PHYSICAL EXAM:   Vitals:    01/31/25 1237   BP: 124/76   Pulse: 102   SpO2: 96%   Weight: 142 lb (64.4 kg)   Height: 5' 1\" (1.549 m)         BMI: Body mass index is 26.83 kg/m².     CONSTITUTIONAL:  awake, alert, cooperative, no apparent distress, and appears stated age  PSYCH: normal affect  EYES:  No proptosis,  conjunctiva normal  ENT:  Normocephalic, atraumatic   NECK:  Supple, symmetrical, (+) R thyroid nodule  LUNGS: breathing comfortably  CARDIOVASCULAR:  regular rate       DATA:   Pertinent data reviewed      ASSESSMENT AND PLAN:      ICD-10-CM    1. Age-related osteoporosis without current pathological fracture  M81.0 XR DEXA BONE DENSITOMETRY (CPT=77080)     Renal Function Panel     VITAMIN D, 25-HYDROXY [22520][Q]         #Age-related osteoporosis without current pathological fracture   Plan:   Pt with mult T spine compression fx and DXA with significant osteopenia (lowest T score -4.4 in Aug 2021). Completed 1 yr of Tymlos (Nov [id number]), now ready to resume anti-resorptive treatment. Previous endo completed secondary workup. Pt does have hx of chronic opioid and steroid medication.   Prolia  #1 7/6/23  Prolia #2 1/26/2024  Prolia #3 7/30/2024  Prolia #4 01/31/25    - continue falls precaution and weight-bearing exercises as able, diet and exercise   - continue Ca and vit D supplementation as appropriate  - Dental clearance not mandated; pt is mostly adentulous  - Verbalized understanding of risks and benefits   - will follow up DXA 6/2025 and labs prior to follow-up visit in 6 months      #Multinodular goiter  Plan: incidentally found on physical exam in 2023; 6/2023 thyroid US with 4 nodules not meeting FNA criteria and 7/2024 Thryoid US with multiple stable TR1 nodules   -No compressive symptoms today  - since stable, only periodic surveillance if patient with symptoms    Return in about 6 months (around 7/31/2025).    The above plan was discussed in detail with the patient who verbalized understanding and agreement.     Apolonia Marino,   [facility]  1/31/2025     In reviewing this note, please be advised that Dragon Voice Recognition software used to dictate the note may have made errors in recognizing some of the words or phrases.     Note to patient: The 21 Century Cures Act makes medical notes like these available to patients in the interest of transparency. However, be advised this is a medical document. It is intended as peer to peer communication. It is written in medical language and may contain abbreviations or verbiage that are unfamiliar. It may appear blunt or direct. Medical documents are intended to carry relevant information, facts as evident, and the clinical opinion of the practitioner.

## 2025-01-31 NOTE — PATIENT INSTRUCTIONS
Visit Summary  You are due for a repeat bone denisty 6/28 onwards  You are due for Prolia #5 around the end of July/beginning of August  We will follow up at that time with labs 7-10 days prior     General follow up information:  Please let us know if you require any refills at least 1 week prior to your medication running out. If you do run out of medication, please call our office ASAP to request refills (do not wait until your follow up).  Please call us if you experience any problems with insurance coverage of medication, lab work, or imaging.   Lab results and imaging will typically be reviewed at follow up appointments, or within 3-5 business days of ALL results being in if you do not have an appointment scheduled in the near future. Our office will contact you for any abnormal results requiring more urgent follow up or action.  The on-call pager is for urgent matters only. If you are a type 1 diabetic and run out of insulin after business hours 8AM-4PM, you may call the on-call pager for a refill to a 24 hour pharmacy. If you have adrenal insufficiency and run out of steroids, you may call the on-call pager for a refill to a 24 hours pharmacy. All other refill requests should be requested during business hours.    Return Visit   [x] Dr. Marino in 6 months   [] Virtual visit  [] No follow up appointment needed  [] Follow up to be scheduled pending      []  Fasting/8AM labs  [x]  Central scheduling # for ultrasound/nuclear med/CT/MRI/DXA/IR  []  Provide flyer for:  [] ENT   [] Weight Management  [] Infertility/Reproductive Endocrinology  [] Transgender care  []  Directions to 1st floor lab  [] Collect urine collection jug  [] Collect salivary cortisol tubes  []  Dental clearance form  []  Will need authorization for outside records

## 2025-02-03 DIAGNOSIS — E78.2 MIXED HYPERLIPIDEMIA: Primary | ICD-10-CM

## 2025-03-19 ENCOUNTER — OFFICE VISIT (OUTPATIENT)
Dept: FAMILY MEDICINE CLINIC | Facility: CLINIC | Age: 80
End: 2025-03-19
Payer: MEDICARE

## 2025-03-19 VITALS
BODY MASS INDEX: 27 KG/M2 | SYSTOLIC BLOOD PRESSURE: 135 MMHG | RESPIRATION RATE: 12 BRPM | WEIGHT: 143 LBS | HEART RATE: 105 BPM | OXYGEN SATURATION: 95 % | DIASTOLIC BLOOD PRESSURE: 80 MMHG | HEIGHT: 61 IN | TEMPERATURE: 97 F

## 2025-03-19 DIAGNOSIS — M25.422 EFFUSION OF LEFT OLECRANON BURSA: Primary | ICD-10-CM

## 2025-03-19 PROCEDURE — 99213 OFFICE O/P EST LOW 20 MIN: CPT | Performed by: FAMILY MEDICINE

## 2025-03-19 PROCEDURE — 20605 DRAIN/INJ JOINT/BURSA W/O US: CPT | Performed by: FAMILY MEDICINE

## 2025-03-19 NOTE — PROGRESS NOTES
The following individual(s) verbally consented to be recorded using ambient AI listening technology and understand that they can each withdraw their consent to this listening technology at any point by asking the clinician to turn off or pause the recording:    Patient name: Pat Roach  Additional names:

## 2025-03-19 NOTE — PROGRESS NOTES
Subjective:   Pat Roach is a 79 year old female who presents for Elbow Pain (Left side - swollen painful )       History/Other:   History of Present Illness  Pat Roach is a 79 year old female who presents with elbow bursitis.left     She noticed the elbow bursitis about a week ago. The condition is described as 'water on the bursa' and is somewhat firm in some areas. She has been monitoring for signs of infection, such as redness or fever, but none have been observed. Ice packs have been used without relief.    She recalls a fall where she landed on her hands and knees, but did not hit her elbow directly. She suspects she might have jammed her wrist during the fall, although the wrist is no longer bothersome.    The elbow issue does not cause pain unless accidentally hit, and it has not increased in size since it was first noticed. She has asked her  to look at her elbow, indicating that it is sometimes in the way but not painful under normal circumstances. No fever or signs of infection such as redness have been observed.   Chief Complaint Reviewed and Verified  Nursing Notes Reviewed and   Verified  Tobacco Reviewed  Allergies Reviewed  Medications Reviewed    Problem List Reviewed  Medical History Reviewed  Surgical History   Reviewed  OB Status Reviewed  Family History Reviewed         Tobacco:  Social History     Tobacco Use   Smoking Status Every Day    Current packs/day: 1.00    Average packs/day: 1 pack/day for 56.0 years (56.0 ttl pk-yrs)    Types: Cigarettes   Smokeless Tobacco Never     E-Cigarettes/Vaping       Questions Responses    E-Cigarette Use Never User               Current Outpatient Medications   Medication Sig Dispense Refill    DULOXETINE 30 MG Oral Cap DR Particles TAKE 1 CAPSULE(30 MG) BY MOUTH DAILY 30 capsule 2    gabapentin 100 MG Oral Cap Take 1 capsule (100 mg total) by mouth 3 (three) times daily. 90 capsule 2    Calcium Carb-Cholecalciferol (OS-ADITHYA CALCIUM +  D3) 500-5 MG-MCG Oral Tab Take by mouth.      denosumab 60 MG/ML Subcutaneous Solution Prefilled Syringe Inject 1 mL (60 mg total) into the skin one time.      Calcium Carbonate-Vitamin D (CALCIUM + D OR) Take 1 tablet by mouth 2 (two) times daily with meals.      Compound Medication Apply 1 g topically 4 (four) times daily as needed. Compound Cream: Diclofenac Sodium 3%, Cyclobenzaprine HCL 2%, Lidocaine HCL 2% (15 g tube) 1 each 0    melatonin 3 MG Oral Tab Take 1 tablet (3 mg total) by mouth nightly.         PHQ-2 SCORE: 0  , done 3/19/2025          Review of Systems:  Pertinent items are noted in HPI.      Objective:   /80 (BP Location: Left arm, Patient Position: Sitting, Cuff Size: adult)   Pulse 105   Temp 97.3 °F (36.3 °C) (Temporal)   Resp 12   Ht 5' 1\" (1.549 m)   Wt 143 lb (64.9 kg)   SpO2 95%   BMI 27.02 kg/m²  Estimated body mass index is 27.02 kg/m² as calculated from the following:    Height as of this encounter: 5' 1\" (1.549 m).    Weight as of this encounter: 143 lb (64.9 kg).  Results       Physical Exam  MUSCULOSKELETAL:left  elbow bursitis, not inflamed. With olecranon fluid pocket   SKIN: No signs of skin infection.  No fever     Assessment & Plan:   1. Effusion of left olecranon bursa (Primary)    Assessment & Plan  Olecranon bursitis  Olecranon bursitis likely from minor trauma or friction. No infection signs, occasional discomfort.  - Perform bursa aspiration to remove fluid.  - Cleanse area with iodine, apply local anesthetic before aspiration.  - Wrap elbow post-procedure.        PROCEDURE       Consent obtained  Aseptic prep   povidone  Lidocaine with epi .25 ml subcut  Then 18 ga needle inserted  6 ml blood tinged fluid removed  Clear not turbid  Hemostatic  no blood loss  Tolerated well  Bandaid applied  And ace wrap compression        No follow-ups on file.        Alfredito Porter MD, 3/19/2025, 2:12 PM

## 2025-04-04 DIAGNOSIS — M80.00XG AGE-RELATED OSTEOPOROSIS WITH CURRENT PATHOLOGICAL FRACTURE WITH DELAYED HEALING, SUBSEQUENT ENCOUNTER: ICD-10-CM

## 2025-04-04 DIAGNOSIS — M54.50 LUMBAR BACK PAIN: ICD-10-CM

## 2025-04-04 DIAGNOSIS — M54.6 MIDLINE THORACIC BACK PAIN, UNSPECIFIED CHRONICITY: ICD-10-CM

## 2025-04-04 RX ORDER — DULOXETIN HYDROCHLORIDE 30 MG/1
30 CAPSULE, DELAYED RELEASE ORAL DAILY
Qty: 30 CAPSULE | Refills: 2 | Status: SHIPPED | OUTPATIENT
Start: 2025-04-04

## 2025-04-04 NOTE — TELEPHONE ENCOUNTER
Medication: DULOXETINE 30 MG Oral Cap DR Reynoso     Date of last refill: 1/6/25    Last office visit: 12/16/24  Due back to clinic per last office note:  6 months  Date next office visit scheduled:  6/16/25      Last OV note recommendation:   Medical Decision Making:   Diagnosis:         Encounter Diagnoses   Name Primary?    Lumbar back pain Yes    Midline thoracic back pain, unspecified chronicity      Pathological fracture of other site due to osteoporosis, unspecified osteoporosis type, initial encounter      Other chronic pain           Impression:   Pat Roach presents for medication review and follow-up.  Patient continues with mid back and lower back pain.  She feels that her pain has been manageable with her current regimen of gabapentin 100 mg 3 times daily and duloxetine 30 mg in the morning as well as over-the-counter Advil dual action.  Patient states that there are still some activities that aggravate her symptoms but she feels that topical medications have been helpful.  No medical changes since her last visit.  No falls injuries or events or increased in her pain symptoms that would indicate the need for updated x-rays.     Patient reports that she sleeps well.     Patient had been on opioids in the past and does not want to use opioid medications for her pain as she had difficulty weaning off of these.  Patient denies any radiating quality to the pain.       Patient does state that the pain that she is having is being modulated with her treatment.  She she feels that her pain is manageable with her current treatments and that the medications improve her quality of life and general mood as well as activities of daily living.  See full plan below..         Total visit time: 20 minutes  More than 50% spent coordinating care, providing patient education, reviewing imaging, discussing conservative vs surgical treatment options and counseling.           Plan:   > Continue duloxetine at 30 mg every  day: Does not need refills today  > Follow-up every 6 months for evaluation and medication management  > Refill gabapentin 100 mg 3 times daily #90 with 2 refills today  > Okay to continue Advil dual action as directed on bottle: Patient taking it approximately 5 times per day and gabapentin 100 mg twice daily  >use TENs twice per day   > Continue calcium vitamin D supplementation with reduced dose per oncology: 2/2 breast cancer  > Okay to continue all nonmedication management for thoracic and lumbar pain  > If thoracic or lumbar pain becomes unmanageable we will need to update imaging: Patient is not wanting to update at this point  >call for med refills  No orders of the defined types were placed in this encounter.        Meds & Refills for this Visit:  Requested Prescriptions

## 2025-04-08 ENCOUNTER — OFFICE VISIT (OUTPATIENT)
Dept: FAMILY MEDICINE CLINIC | Facility: CLINIC | Age: 80
End: 2025-04-08
Payer: MEDICARE

## 2025-04-08 ENCOUNTER — LABORATORY ENCOUNTER (OUTPATIENT)
Dept: LAB | Age: 80
End: 2025-04-08
Attending: FAMILY MEDICINE
Payer: MEDICARE

## 2025-04-08 VITALS
BODY MASS INDEX: 27 KG/M2 | WEIGHT: 143 LBS | TEMPERATURE: 99 F | DIASTOLIC BLOOD PRESSURE: 70 MMHG | RESPIRATION RATE: 28 BRPM | HEART RATE: 156 BPM | OXYGEN SATURATION: 97 % | SYSTOLIC BLOOD PRESSURE: 126 MMHG

## 2025-04-08 DIAGNOSIS — E04.1 THYROID NODULE: ICD-10-CM

## 2025-04-08 DIAGNOSIS — G89.29 CHRONIC MIDLINE THORACIC BACK PAIN: ICD-10-CM

## 2025-04-08 DIAGNOSIS — I47.10 SVT (SUPRAVENTRICULAR TACHYCARDIA) (HCC): ICD-10-CM

## 2025-04-08 DIAGNOSIS — R11.10 DRY HEAVES: ICD-10-CM

## 2025-04-08 DIAGNOSIS — F41.9 ANXIETY: ICD-10-CM

## 2025-04-08 DIAGNOSIS — R00.0 TACHYCARDIA: ICD-10-CM

## 2025-04-08 DIAGNOSIS — M54.6 CHRONIC MIDLINE THORACIC BACK PAIN: ICD-10-CM

## 2025-04-08 DIAGNOSIS — R00.0 TACHYCARDIA: Primary | ICD-10-CM

## 2025-04-08 LAB
ALBUMIN SERPL-MCNC: 4.8 G/DL (ref 3.2–4.8)
ALBUMIN/GLOB SERPL: 1.8 {RATIO} (ref 1–2)
ALP LIVER SERPL-CCNC: 73 U/L
ALT SERPL-CCNC: 12 U/L
ANION GAP SERPL CALC-SCNC: 12 MMOL/L (ref 0–18)
AST SERPL-CCNC: 19 U/L (ref ?–34)
ATRIAL RATE: 163 BPM
BASOPHILS # BLD AUTO: 0.05 X10(3) UL (ref 0–0.2)
BASOPHILS NFR BLD AUTO: 0.3 %
BILIRUB SERPL-MCNC: 0.8 MG/DL (ref 0.2–1.1)
BUN BLD-MCNC: 16 MG/DL (ref 9–23)
CALCIUM BLD-MCNC: 9.8 MG/DL (ref 8.7–10.6)
CHLORIDE SERPL-SCNC: 102 MMOL/L (ref 98–112)
CO2 SERPL-SCNC: 24 MMOL/L (ref 21–32)
CREAT BLD-MCNC: 1.04 MG/DL
EGFRCR SERPLBLD CKD-EPI 2021: 55 ML/MIN/1.73M2 (ref 60–?)
EOSINOPHIL # BLD AUTO: 0.12 X10(3) UL (ref 0–0.7)
EOSINOPHIL NFR BLD AUTO: 0.7 %
ERYTHROCYTE [DISTWIDTH] IN BLOOD BY AUTOMATED COUNT: 14.1 %
FASTING STATUS PATIENT QL REPORTED: NO
GLOBULIN PLAS-MCNC: 2.6 G/DL (ref 2–3.5)
GLUCOSE BLD-MCNC: 98 MG/DL (ref 70–99)
HCT VFR BLD AUTO: 42.1 %
HGB BLD-MCNC: 13.2 G/DL
IMM GRANULOCYTES # BLD AUTO: 0.08 X10(3) UL (ref 0–1)
IMM GRANULOCYTES NFR BLD: 0.5 %
LYMPHOCYTES # BLD AUTO: 2.85 X10(3) UL (ref 1–4)
LYMPHOCYTES NFR BLD AUTO: 17 %
MCH RBC QN AUTO: 28.3 PG (ref 26–34)
MCHC RBC AUTO-ENTMCNC: 31.4 G/DL (ref 31–37)
MCV RBC AUTO: 90.3 FL
MONOCYTES # BLD AUTO: 1.26 X10(3) UL (ref 0.1–1)
MONOCYTES NFR BLD AUTO: 7.5 %
NEUTROPHILS # BLD AUTO: 12.36 X10 (3) UL (ref 1.5–7.7)
NEUTROPHILS # BLD AUTO: 12.36 X10(3) UL (ref 1.5–7.7)
NEUTROPHILS NFR BLD AUTO: 74 %
OSMOLALITY SERPL CALC.SUM OF ELEC: 287 MOSM/KG (ref 275–295)
PLATELET # BLD AUTO: 278 10(3)UL (ref 150–450)
POTASSIUM SERPL-SCNC: 4.2 MMOL/L (ref 3.5–5.1)
PROT SERPL-MCNC: 7.4 G/DL (ref 5.7–8.2)
Q-T INTERVAL: 304 MS
QRS DURATION: 78 MS
QTC CALCULATION (BEZET): 497 MS
R AXIS: 74 DEGREES
RBC # BLD AUTO: 4.66 X10(6)UL
SODIUM SERPL-SCNC: 138 MMOL/L (ref 136–145)
T AXIS: 52 DEGREES
T4 FREE SERPL-MCNC: 1.4 NG/DL (ref 0.8–1.7)
TSI SER-ACNC: 2.93 UIU/ML (ref 0.55–4.78)
VENTRICULAR RATE: 161 BPM
WBC # BLD AUTO: 16.7 X10(3) UL (ref 4–11)

## 2025-04-08 PROCEDURE — 80053 COMPREHEN METABOLIC PANEL: CPT

## 2025-04-08 PROCEDURE — 84443 ASSAY THYROID STIM HORMONE: CPT

## 2025-04-08 PROCEDURE — 36415 COLL VENOUS BLD VENIPUNCTURE: CPT

## 2025-04-08 PROCEDURE — 85025 COMPLETE CBC W/AUTO DIFF WBC: CPT

## 2025-04-08 PROCEDURE — 84439 ASSAY OF FREE THYROXINE: CPT

## 2025-04-08 PROCEDURE — 99215 OFFICE O/P EST HI 40 MIN: CPT | Performed by: FAMILY MEDICINE

## 2025-04-08 PROCEDURE — 93000 ELECTROCARDIOGRAM COMPLETE: CPT | Performed by: FAMILY MEDICINE

## 2025-04-08 RX ORDER — METOPROLOL TARTRATE 25 MG/1
25 TABLET, FILM COATED ORAL 2 TIMES DAILY
Qty: 60 TABLET | Refills: 0 | Status: SHIPPED | OUTPATIENT
Start: 2025-04-08

## 2025-04-08 NOTE — PROGRESS NOTES
Pat Roach is a 79 year old female.  Chief Complaint   Patient presents with    Consult     Dry heaving x 3 days - denied diarrhea, fever     Here w/   HPI:   Dry heaves in am x 3 days, no vomiting, appetite good, no abd pain, bm normal  Occ hiccups,no HAs,  Getting over a cold  Some PND, no cough  Current Outpatient Medications   Medication Sig Dispense Refill    metoprolol tartrate 25 MG Oral Tab Take 1 tablet (25 mg total) by mouth 2 (two) times daily. 60 tablet 0    DULOXETINE 30 MG Oral Cap DR Particles TAKE 1 CAPSULE(30 MG) BY MOUTH DAILY 30 capsule 2    gabapentin 100 MG Oral Cap Take 1 capsule (100 mg total) by mouth 3 (three) times daily. 90 capsule 2    Calcium Carb-Cholecalciferol (OS-ADITHYA CALCIUM + D3) 500-5 MG-MCG Oral Tab Take by mouth.      denosumab 60 MG/ML Subcutaneous Solution Prefilled Syringe Inject 1 mL (60 mg total) into the skin one time.      Calcium Carbonate-Vitamin D (CALCIUM + D OR) Take 1 tablet by mouth 2 (two) times daily with meals.      Compound Medication Apply 1 g topically 4 (four) times daily as needed. Compound Cream: Diclofenac Sodium 3%, Cyclobenzaprine HCL 2%, Lidocaine HCL 2% (15 g tube) 1 each 0    melatonin 3 MG Oral Tab Take 1 tablet (3 mg total) by mouth nightly.        Past Medical History:    Amastia    Compression fracture of T9 vertebra (HCC)    Lobular carcinoma of breast (HCC)    Dx/ 9/2015. LEFT. T1c,N0,M0. Lobular. ER+,ID+,Her2/Dania-. Oncotype Dx. Risk = 10%. Rx. MRM > Femara.  Dx. 9/2015. RIGHT.T1c,N0,M0. Lobular + LCIS, ER+,ID+,Her2/Dania-. Oncotype Dx. 11% risk. Rx. MRM + SND > Femara.  Dx/ 9/2015. LEFT. T1c,N0,M0. Lobular. ER+,ID+,Her2/Dania-. Oncotype Dx. Risk = 10%. Rx. MRM > Femara.    Malignant neoplasm of breast (HCC)    Midline thoracic back pain    S/P bilateral mastectomy    S/P breast reconstruction, bilateral    Situational anxiety    Situational depression    Symptomatic menopausal or female climacteric states      Social History:  Social  History     Socioeconomic History    Marital status:    Tobacco Use    Smoking status: Every Day     Current packs/day: 1.00     Average packs/day: 1 pack/day for 56.0 years (56.0 ttl pk-yrs)     Types: Cigarettes    Smokeless tobacco: Never   Vaping Use    Vaping status: Never Used   Substance and Sexual Activity    Alcohol use: Never    Drug use: Never     Social Drivers of Health     Food Insecurity: No Food Insecurity (8/12/2024)    Received from [facility]    Food Insecurity     Currently or in the past 3 months, have you worried your food would run out before you had money to buy more?: No     In the past 12 months, have you run out of food or been unable to get more?: No   Transportation Needs: No Transportation Needs (8/12/2024)    Received from [facility]    Transportation Needs     Currently or in the past 3 months, has lack of transportation kept you from medical appointments, getting food or medicine, or providing care to a family member?: Unrecognized value     Medical Transportation Needs?: No        REVIEW OF SYSTEMS:   GENERAL HEALTH: feels well otherwise, + fatigue, appetite ok, no wt loss, patient takes accommodation of Tylenol and ibuprofen for back pain  SKIN: denies any unusual skin lesions or rashes  ENT: denies nasal congestion, pnd, sore throat, ear pain or pressure, decreased hearing  RESPIRATORY: denies shortness of breath with exertion,cough, wheezing  CARDIOVASCULAR: denies chest pain on exertion, edema  GI: denies abdominal pain and denies heartburn, see hpi  NEURO: denies headaches  PSYCH: + anxious    EXAM:   /70 (BP Location: Left arm, Patient Position: Sitting, Cuff Size: adult)   Pulse (!) 156   Temp 98.6 °F (37 °C) (Temporal)   Resp (!) 28   Wt 143 lb (64.9 kg)   SpO2 97%   BMI 27.02 kg/m²   GENERAL: well developed, well nourished,in no apparent distress, well hydrated  SKIN: no rashes,no suspicious lesions  ENT: TMs:  intact, good mobility, Nose: turbinates clear, no dc, Throat: no erythema, pnd, or lesions  NECK: supple,no adenopathy,no bruits, no thyromegaly  LUNGS: clear to auscultation, no w/r/r  CARDIO: Heart rate tachycardic without murmur, peripheral pulses intact  GI: good BS's,no masses, HSM or tenderness  EXTREMITIES: FROM of all joints, no edema  EKG: Supraventricular tachycardia,, QRS interval 0.07, QRS axis 74 degrees, no ischemic changes, occasional PVC  ASSESSMENT AND PLAN:     Encounter Diagnoses   Name Primary?    Tachycardia Yes    Dry heaves     SVT (supraventricular tachycardia) (HCC)     Thyroid nodule     Anxiety     Chronic midline thoracic back pain      Orders Placed This Encounter   Procedures    Comp Metabolic Panel (14) [E]    TSH and Free T4 [E]    CBC W Differential W Platelet [E]     Meds & Refills for this Visit:  Requested Prescriptions     Signed Prescriptions Disp Refills    metoprolol tartrate 25 MG Oral Tab 60 tablet 0     Sig: Take 1 tablet (25 mg total) by mouth 2 (two) times daily.     Imaging & Consults:  ELECTROCARDIOGRAM, COMPLETE  No follow-ups on file.  Patient Instructions   45 minutes spent with the patient and her .  Discussed possible contributing factors.  I advised patient I am concerned about her tachycardia.  Will start metoprolol tart.  25 mg twice daily  Will check thyroid function studies, CBC, CMP  If labs are normal, will add omeprazole 40 mg daily and schedule echocardiogram.  Will discuss case with patient's PCP  The patient indicates understanding of these issues and agrees to the plan.    Gary Cueva DO, FAAFP

## 2025-04-08 NOTE — PATIENT INSTRUCTIONS
45 minutes spent with the patient and her .  Discussed possible contributing factors.  I advised patient I am concerned about her tachycardia.  Will start metoprolol tart.  25 mg twice daily  Will check thyroid function studies, CBC, CMP  If labs are normal, will add omeprazole 40 mg daily and schedule echocardiogram.  Will discuss case with patient's PCP

## 2025-04-09 RX ORDER — OMEPRAZOLE 40 MG/1
40 CAPSULE, DELAYED RELEASE ORAL DAILY
Qty: 90 CAPSULE | Refills: 0 | OUTPATIENT
Start: 2025-04-09

## 2025-04-09 RX ORDER — METOPROLOL TARTRATE 25 MG/1
25 TABLET, FILM COATED ORAL 2 TIMES DAILY
Qty: 180 TABLET | Refills: 0 | OUTPATIENT
Start: 2025-04-09

## 2025-04-11 ENCOUNTER — TELEPHONE (OUTPATIENT)
Dept: FAMILY MEDICINE CLINIC | Facility: CLINIC | Age: 80
End: 2025-04-11

## 2025-04-11 NOTE — TELEPHONE ENCOUNTER
Reviewed with patient that she was started on metoprolol tartrate BID 4-8-25 states this medication is making her tired and feels wiped out. She reports heart rate is staying steady in 150's..  Denies dizziness, BP running 128/72.    Per patient has scheduled appointment for echocardiogram and appointment with Dr Farr.    Patient will continued with medication until directed differently    Please advise.

## 2025-04-11 NOTE — TELEPHONE ENCOUNTER
Was prescribed blood thinner, last visit 04/08/2025, pill is work in terms of blood thinner but is \"wiping her out\"

## 2025-04-18 ENCOUNTER — TELEPHONE (OUTPATIENT)
Dept: FAMILY MEDICINE CLINIC | Facility: CLINIC | Age: 80
End: 2025-04-18

## 2025-04-18 NOTE — TELEPHONE ENCOUNTER
Patient will start home health, recently discharged from the hospital with dx of A-fib. Will Dr. Porter be willing to follow.

## 2025-04-21 ENCOUNTER — LAB ENCOUNTER (OUTPATIENT)
Dept: LAB | Age: 80
End: 2025-04-21
Payer: MEDICARE

## 2025-04-21 ENCOUNTER — OFFICE VISIT (OUTPATIENT)
Dept: FAMILY MEDICINE CLINIC | Facility: CLINIC | Age: 80
End: 2025-04-21
Payer: MEDICARE

## 2025-04-21 VITALS
SYSTOLIC BLOOD PRESSURE: 110 MMHG | HEIGHT: 61 IN | TEMPERATURE: 98 F | DIASTOLIC BLOOD PRESSURE: 70 MMHG | BODY MASS INDEX: 26.06 KG/M2 | HEART RATE: 76 BPM | WEIGHT: 138 LBS

## 2025-04-21 DIAGNOSIS — Z09 HOSPITAL DISCHARGE FOLLOW-UP: ICD-10-CM

## 2025-04-21 DIAGNOSIS — I48.91 NEW ONSET ATRIAL FIBRILLATION (HCC): ICD-10-CM

## 2025-04-21 DIAGNOSIS — J18.9 COMMUNITY ACQUIRED PNEUMONIA, UNSPECIFIED LATERALITY: ICD-10-CM

## 2025-04-21 DIAGNOSIS — K92.1 BLACK STOOLS: ICD-10-CM

## 2025-04-21 DIAGNOSIS — K92.1 BLACK STOOLS: Primary | ICD-10-CM

## 2025-04-21 LAB
BASOPHILS # BLD AUTO: 0.09 X10(3) UL (ref 0–0.2)
BASOPHILS NFR BLD AUTO: 0.6 %
EOSINOPHIL # BLD AUTO: 0.42 X10(3) UL (ref 0–0.7)
EOSINOPHIL NFR BLD AUTO: 2.8 %
ERYTHROCYTE [DISTWIDTH] IN BLOOD BY AUTOMATED COUNT: 13.4 %
HCT VFR BLD AUTO: 46.2 % (ref 35–48)
HEMOCCULT STL QL: POSITIVE
HGB BLD-MCNC: 14.3 G/DL (ref 12–16)
IMM GRANULOCYTES # BLD AUTO: 0.06 X10(3) UL (ref 0–1)
IMM GRANULOCYTES NFR BLD: 0.4 %
LYMPHOCYTES # BLD AUTO: 3.04 X10(3) UL (ref 1–4)
LYMPHOCYTES NFR BLD AUTO: 20.3 %
MCH RBC QN AUTO: 28.1 PG (ref 26–34)
MCHC RBC AUTO-ENTMCNC: 31 G/DL (ref 31–37)
MCV RBC AUTO: 90.8 FL (ref 80–100)
MONOCYTES # BLD AUTO: 1.22 X10(3) UL (ref 0.1–1)
MONOCYTES NFR BLD AUTO: 8.2 %
NEUTROPHILS # BLD AUTO: 10.13 X10 (3) UL (ref 1.5–7.7)
NEUTROPHILS # BLD AUTO: 10.13 X10(3) UL (ref 1.5–7.7)
NEUTROPHILS NFR BLD AUTO: 67.7 %
PLATELET # BLD AUTO: 322 10(3)UL (ref 150–450)
RBC # BLD AUTO: 5.09 X10(6)UL (ref 3.8–5.3)
WBC # BLD AUTO: 15 X10(3) UL (ref 4–11)

## 2025-04-21 PROCEDURE — 99215 OFFICE O/P EST HI 40 MIN: CPT

## 2025-04-21 PROCEDURE — 36415 COLL VENOUS BLD VENIPUNCTURE: CPT

## 2025-04-21 PROCEDURE — 85025 COMPLETE CBC W/AUTO DIFF WBC: CPT

## 2025-04-21 PROCEDURE — 82274 ASSAY TEST FOR BLOOD FECAL: CPT

## 2025-04-21 RX ORDER — DILTIAZEM HYDROCHLORIDE 30 MG/1
30 TABLET, FILM COATED ORAL 3 TIMES DAILY
COMMUNITY
Start: 2025-04-17

## 2025-04-21 RX ORDER — METOPROLOL TARTRATE 50 MG
50 TABLET ORAL 2 TIMES DAILY
COMMUNITY
Start: 2025-04-17

## 2025-04-21 NOTE — PROGRESS NOTES
Subjective:   Pat Roach is a 79 year old female who presents for hospital follow up.   [facility]   Admit Date: 4/12/25  Discharge Date: 4/17/25  Hospital Discharge Diagnosis: A fib with RVR, Acute HFpEF in setting of A-fib (resolved), Possible community acquired pneumonia, asymptomatic bacteriuria      I accessed Psychiatric and/or Care Everywhere and personally reviewed the following for the recent hospitalization: provider notes, consults, summaries, labs and other test results and the pertinent findings are documented below.     HPI: Patient presents today reports she is taking her oral antibiotic augmentin, her blood thinner Apixaban, her Diltiazem, and her Metoprolol.  Patient reports she is feeling much better than when she went to the emergency department, slightly tired.  Reports has been having black tarry stools since prior to hospital discharge.      History/Other:   Current Medications:  Medication Reconciliation:  I am aware of an inpatient discharge within the last 30 days.  The discharge medication list has been reconciled with the patient's current medication list and reviewed by me. See medication list for additions of new medication, and changes to current doses of medications and discontinued medications.  Active Meds, Sig Only[1]    Review of Systems:  GENERAL: weight stable, energy improving per patient, no sweating  SKIN: denies any unusual skin lesions  EYES: denies blurred vision or double vision  HEENT: denies nasal congestion, sinus pain or ST  LUNGS: denies shortness of breath with exertion  CARDIOVASCULAR: denies chest pain on exertion or palpitations  GI: denies abdominal pain, denies heartburn, positive for black tarry stools  MUSCULOSKELETAL: denies pain, normal range of motion of extremities  NEURO: denies headaches, denies dizziness, denies weakness  PSYCHE: denies depression or anxiety  HEMATOLOGIC: denies hx of anemia, or bruising,  positive black stools on blood thinner      Objective:   No LMP recorded. Patient is postmenopausal.  Estimated body mass index is 26.07 kg/m² as calculated from the following:    Height as of this encounter: 5' 1\" (1.549 m).    Weight as of this encounter: 138 lb (62.6 kg).   /70   Pulse 76   Temp 98.1 °F (36.7 °C) (Temporal)   Ht 5' 1\" (1.549 m)   Wt 138 lb (62.6 kg)   BMI 26.07 kg/m²    GENERAL: well developed, well nourished, in no apparent distress  SKIN: no rashes, no suspicious lesions  HEENT: atraumatic, normocephalic, ears and throat are clear  EYES: PERRLA, conjunctiva are clear  NECK: supple, no adenopathy, no bruits  CHEST: no chest tenderness  LUNGS: clear to auscultation  CARDIO: RRR without murmur  GI: good BS's, no masses, HSM or tenderness  MUSCULOSKELETAL:  FROM of the extremities  EXTREMITIES: no cyanosis, clubbing or edema  NEURO: Oriented times three, motor and sensory are grossly intact, denies dizziness, lightheadedness, syncope    Assessment & Plan:   1. Black stools (Primary)  -     CBC With Differential With Platelet; Future; Expected date: 04/21/2025  -     Occult Blood, Fecal, FIT Immunoassay; Future; Expected date: 04/21/2025  2. New onset atrial fibrillation (HCC)  3. Hospital discharge follow-up  4. Community acquired pneumonia, unspecified laterality      Recommend continue current medications.  Will check CBC and FOB due to patient report of black tarry stools on blood thinner.  Reinforced importance of scheduling follow up with cardiologist.  Signs and symptoms that warrant emergency department evaluation discussed. Recommend close follow up with pcp. Patient verbalized understanding and in agreement with plan.    No follow-ups on file.     40 minutes were spent on assessment, education, and discussion of plan.    GERARD Dumont       [1]   Outpatient Medications Marked as Taking for the 4/21/25 encounter (Office Visit) with Sahara Pascual APRN   Medication Sig     apixaban 5 MG Oral Tab Take 1 tablet (5 mg total) by mouth 2 (two) times daily.    dilTIAZem 30 MG Oral Tab Take 1 tablet (30 mg total) by mouth 3 (three) times daily.    [] amoxicillin clavulanate 875-125 MG Oral Tab Take 1 tablet by mouth 2 (two) times daily.    metoprolol tartrate 50 MG Oral Tab Take 1 tablet (50 mg total) by mouth 2 (two) times daily.    Omeprazole 40 MG Oral Capsule Delayed Release Take 1 capsule (40 mg total) by mouth Before Dinner.    metoprolol tartrate 25 MG Oral Tab Take 1 tablet (25 mg total) by mouth 2 (two) times daily.    DULOXETINE 30 MG Oral Cap DR Particles TAKE 1 CAPSULE(30 MG) BY MOUTH DAILY    [DISCONTINUED] gabapentin 100 MG Oral Cap Take 1 capsule (100 mg total) by mouth 3 (three) times daily.    Calcium Carb-Cholecalciferol (OS-ADITHYA CALCIUM + D3) 500-5 MG-MCG Oral Tab Take by mouth.    denosumab 60 MG/ML Subcutaneous Solution Prefilled Syringe Inject 1 mL (60 mg total) into the skin one time.    Calcium Carbonate-Vitamin D (CALCIUM + D OR) Take 1 tablet by mouth 2 (two) times daily with meals.    Compound Medication Apply 1 g topically 4 (four) times daily as needed. Compound Cream: Diclofenac Sodium 3%, Cyclobenzaprine HCL 2%, Lidocaine HCL 2% (15 g tube)    melatonin 3 MG Oral Tab Take 1 tablet (3 mg total) by mouth nightly.

## 2025-04-22 DIAGNOSIS — D72.825 BANDEMIA: Primary | ICD-10-CM

## 2025-04-27 DIAGNOSIS — M54.6 MIDLINE THORACIC BACK PAIN, UNSPECIFIED CHRONICITY: ICD-10-CM

## 2025-04-27 DIAGNOSIS — G89.29 OTHER CHRONIC PAIN: ICD-10-CM

## 2025-04-27 DIAGNOSIS — M80.00XA PATHOLOGICAL FRACTURE OF OTHER SITE DUE TO OSTEOPOROSIS, UNSPECIFIED OSTEOPOROSIS TYPE, INITIAL ENCOUNTER: ICD-10-CM

## 2025-04-27 DIAGNOSIS — M54.50 LUMBAR BACK PAIN: ICD-10-CM

## 2025-04-28 ENCOUNTER — LABORATORY ENCOUNTER (OUTPATIENT)
Dept: LAB | Age: 80
End: 2025-04-28
Attending: FAMILY MEDICINE
Payer: MEDICARE

## 2025-04-28 ENCOUNTER — TELEPHONE (OUTPATIENT)
Dept: FAMILY MEDICINE CLINIC | Facility: CLINIC | Age: 80
End: 2025-04-28

## 2025-04-28 DIAGNOSIS — D72.825 BANDEMIA: ICD-10-CM

## 2025-04-28 LAB
BASOPHILS # BLD AUTO: 0.06 X10(3) UL (ref 0–0.2)
BASOPHILS NFR BLD AUTO: 0.5 %
EOSINOPHIL # BLD AUTO: 0.22 X10(3) UL (ref 0–0.7)
EOSINOPHIL NFR BLD AUTO: 1.9 %
ERYTHROCYTE [DISTWIDTH] IN BLOOD BY AUTOMATED COUNT: 13.5 %
HCT VFR BLD AUTO: 40.4 % (ref 35–48)
HGB BLD-MCNC: 12.4 G/DL (ref 12–16)
IMM GRANULOCYTES # BLD AUTO: 0.06 X10(3) UL (ref 0–1)
IMM GRANULOCYTES NFR BLD: 0.5 %
LYMPHOCYTES # BLD AUTO: 2.96 X10(3) UL (ref 1–4)
LYMPHOCYTES NFR BLD AUTO: 25.9 %
MCH RBC QN AUTO: 27.6 PG (ref 26–34)
MCHC RBC AUTO-ENTMCNC: 30.7 G/DL (ref 31–37)
MCV RBC AUTO: 89.8 FL (ref 80–100)
MONOCYTES # BLD AUTO: 0.83 X10(3) UL (ref 0.1–1)
MONOCYTES NFR BLD AUTO: 7.3 %
NEUTROPHILS # BLD AUTO: 7.28 X10 (3) UL (ref 1.5–7.7)
NEUTROPHILS # BLD AUTO: 7.28 X10(3) UL (ref 1.5–7.7)
NEUTROPHILS NFR BLD AUTO: 63.9 %
PLATELET # BLD AUTO: 339 10(3)UL (ref 150–450)
RBC # BLD AUTO: 4.5 X10(6)UL (ref 3.8–5.3)
WBC # BLD AUTO: 11.4 X10(3) UL (ref 4–11)

## 2025-04-28 PROCEDURE — 85025 COMPLETE CBC W/AUTO DIFF WBC: CPT

## 2025-04-28 PROCEDURE — 36415 COLL VENOUS BLD VENIPUNCTURE: CPT

## 2025-04-28 RX ORDER — GABAPENTIN 100 MG/1
100 CAPSULE ORAL 3 TIMES DAILY
Qty: 90 CAPSULE | Refills: 2 | Status: SHIPPED | OUTPATIENT
Start: 2025-04-28

## 2025-04-28 NOTE — TELEPHONE ENCOUNTER
Medication: gabapentin 100 MG     Date of last refill: 12/16/24      Last office visit: 12/16/24  Due back to clinic per last office note:  6 months  Date next office visit scheduled:  06/16/25        Last OV note recommendation:   Plan:   > Continue duloxetine at 30 mg every day: Does not need refills today  > Follow-up every 6 months for evaluation and medication management  > Refill gabapentin 100 mg 3 times daily #90 with 2 refills today  > Okay to continue Advil dual action as directed on bottle: Patient taking it approximately 5 times per day and gabapentin 100 mg twice daily  >use TENs twice per day   > Continue calcium vitamin D supplementation with reduced dose per oncology: 2/2 breast cancer  > Okay to continue all nonmedication management for thoracic and lumbar pain  > If thoracic or lumbar pain becomes unmanageable we will need to update imaging: Patient is not wanting to update at this point  >call for med refills  No orders of the defined types were placed in this encounter.         
11-Mar-2025 06:02

## 2025-04-28 NOTE — TELEPHONE ENCOUNTER
Per Care Everywhere Ferritin level was done on 4-25-25, patient is needing a CBC.     Reviewed with patient and will come in today as scheduled    Future Appointments   Date Time Provider Department Center   4/28/2025 11:00 AM REF [facility] SW [facility] REF [id number] Ref [facility] [place]   6/16/2025 11:00 AM Cassandra Buckner APRN ENIPain [facility] [place]   7/28/2025  1:00 PM OS DEXA RM1 OS DEXA [place]   8/1/2025 12:30 PM Apolonia Marino DO [id number] [facility] [place]

## 2025-04-28 NOTE — TELEPHONE ENCOUNTER
She had labs drawn on Friday and wants to know if she still needs to come in today for labs for Dr Porter.  She was at Dr Roman's

## 2025-05-02 ENCOUNTER — MED REC SCAN ONLY (OUTPATIENT)
Dept: FAMILY MEDICINE CLINIC | Facility: CLINIC | Age: 80
End: 2025-05-02

## 2025-05-16 RX ORDER — OMEPRAZOLE 40 MG/1
40 CAPSULE, DELAYED RELEASE ORAL DAILY
Qty: 30 CAPSULE | Refills: 0 | Status: SHIPPED | OUTPATIENT
Start: 2025-05-16

## 2025-05-16 NOTE — TELEPHONE ENCOUNTER
Gastrointestional Medication Protocol [id number]/16/2025 08:58 AM   Protocol Details Medication is active on med list    In person appointment or virtual visit in the past 12 mos or appointment in next 3 mos          Last office visit: 03/19/25 w/ pcp  Last refill:  04/09/25  #30, no refills  Last cmp: 04/17/25     Future Appointments   Date Time Provider Department Center   6/16/2025 11:00 AM Cassandra Buckner APRN ENIPain [facility] [place]   7/28/2025  1:00 PM OS DEXA RM1 OS DEXA [place]   8/1/2025 12:30 PM Apolonia Marino DO [id number] [facility] [place]

## 2025-05-20 ENCOUNTER — HOME HEALTH CHARGES (OUTPATIENT)
Dept: FAMILY MEDICINE CLINIC | Facility: CLINIC | Age: 80
End: 2025-05-20

## 2025-05-20 DIAGNOSIS — I48.91 ATRIAL FIBRILLATION, UNSPECIFIED TYPE (HCC): Primary | ICD-10-CM

## 2025-05-22 ENCOUNTER — TELEPHONE (OUTPATIENT)
Dept: FAMILY MEDICINE CLINIC | Facility: CLINIC | Age: 80
End: 2025-05-22

## 2025-05-22 NOTE — TELEPHONE ENCOUNTER
1) Received fax from [facility] requesting medical clearance for EGD/Colonoscopy scheduled for 6-5-25.    Patient notified and appointment scheduled:    Future Appointments   Date Time Provider Department Center   5/28/2025  1:00 PM Sahara Pascual APRN EMGSW [facility] [place]   6/16/2025 11:00 AM Cassandra Buckner APRN ENIPain [facility] [place]   7/28/2025  1:00 PM OS DEXA RM1 OS DEXA [place]   8/1/2025 12:30 PM Apolonia Marino DO [id number] [facility] [place]       2)  Patient wondering if Dr Porter would be willing to refill her gabapentin and duloxetine as prescribed by pain management for  back pain. Patient states is stable on these medications.  She has been doing televisits with pain management and they are becoming too expensive for her.

## 2025-05-23 NOTE — TELEPHONE ENCOUNTER
Pat notified of provider comments regarding medications and verbalized understanding and agreement.    Verified that she takes:   Gabapentin 100 mg TID and duloxetine 30 mg daily, epic medication list is correct.    Per patient does not need any refills at this time.

## 2025-05-23 NOTE — TELEPHONE ENCOUNTER
I can manage those   but I cannot manage any opioids  [She is not on any though]  Confirm the doses

## 2025-05-28 ENCOUNTER — OFFICE VISIT (OUTPATIENT)
Dept: FAMILY MEDICINE CLINIC | Facility: CLINIC | Age: 80
End: 2025-05-28
Payer: MEDICARE

## 2025-05-28 VITALS
BODY MASS INDEX: 26 KG/M2 | TEMPERATURE: 97 F | OXYGEN SATURATION: 97 % | WEIGHT: 139 LBS | HEART RATE: 89 BPM | DIASTOLIC BLOOD PRESSURE: 70 MMHG | SYSTOLIC BLOOD PRESSURE: 104 MMHG | RESPIRATION RATE: 18 BRPM

## 2025-05-28 DIAGNOSIS — I48.91 NEW ONSET ATRIAL FIBRILLATION (HCC): ICD-10-CM

## 2025-05-28 DIAGNOSIS — Z01.818 PREOP EXAMINATION: Primary | ICD-10-CM

## 2025-05-28 DIAGNOSIS — R19.5 FECAL OCCULT BLOOD TEST POSITIVE: ICD-10-CM

## 2025-05-28 PROCEDURE — 99214 OFFICE O/P EST MOD 30 MIN: CPT

## 2025-05-28 RX ORDER — PANTOPRAZOLE SODIUM 40 MG/1
40 TABLET, DELAYED RELEASE ORAL
COMMUNITY
Start: 2025-04-13

## 2025-05-28 NOTE — PROGRESS NOTES
Pat Roach is a 79 year old female who presents for a pre-operative physical exam.     Patient is scheduled for an EGD and colonoscopy at [facility] on 6/5/2025 performed by Dr Roman.   Indication: fecal occult blood test positive    She has had previous anesthesia:  Yes.    Previous complications:  No  Personal or Family History Malignant Hyperthermia: No    Respiratory Disease: none known  Cardiac Disease: atrial fibrillation  Sleep Apnea: none known      Short Social Hx on File[1]   Past Medical History[2]  Past Surgical History[3]  Allergies: Allergies[4]  Current Medications[5]     LABORATORY DATA:   CBC:  Lab Results   Component Value Date    WBC 11.4 (H) 04/28/2025    WBC 15.0 (H) 04/21/2025    WBC 16.7 (H) 04/08/2025     Lab Results   Component Value Date    HGB 12.4 04/28/2025    HGB 14.3 04/21/2025    HGB 13.2 04/08/2025      Lab Results   Component Value Date    .0 04/28/2025    .0 04/21/2025    .0 04/08/2025      BMP:   No results found for: \"GLUCOSE\"  Lab Results   Component Value Date    K 4.2 04/08/2025    K 4.3 01/24/2025    K 4.4 06/26/2024     Lab Results   Component Value Date    BUN 16 04/08/2025    BUN 18 01/24/2025    BUN 14 06/26/2024     Lab Results   Component Value Date    CREATSERUM 1.04 (H) 04/08/2025    CREATSERUM 1.08 (H) 01/24/2025    CREATSERUM 0.94 06/26/2024      PT/INR:   No results found for: \"PT\", \"INR\"     REVIEW OF SYSTEMS:   Review of Systems   Constitutional:  Positive for malaise/fatigue. Negative for chills, fever and weight loss.   HENT:  Negative for congestion, ear pain and sore throat.    Eyes:  Negative for pain, discharge and redness.   Respiratory:  Negative for cough and shortness of breath.    Cardiovascular:  Negative for chest pain, palpitations, orthopnea and claudication.   Gastrointestinal:  Negative for abdominal pain, constipation, diarrhea, nausea and vomiting.   Genitourinary:  Negative for dysuria, frequency and urgency.    Musculoskeletal:  Negative for neck pain.   Skin:  Negative for rash.   Neurological:  Negative for dizziness and headaches.        PHYSICAL EXAM:   /70 (BP Location: Left arm, Patient Position: Sitting)   Pulse 89   Temp 97.3 °F (36.3 °C) (Temporal)   Resp 18   Wt 139 lb (63 kg)   SpO2 97%   BMI 26.26 kg/m²    Physical Exam  Vitals reviewed.   Constitutional:       General: She is not in acute distress.     Appearance: She is not ill-appearing.   HENT:      Head: Atraumatic.      Right Ear: External ear normal.      Left Ear: External ear normal.      Mouth/Throat:      Mouth: Mucous membranes are moist.   Eyes:      General:         Right eye: No discharge.         Left eye: No discharge.      Conjunctiva/sclera: Conjunctivae normal.   Cardiovascular:      Rate and Rhythm: Normal rate. Rhythm irregular.      Pulses: Normal pulses.      Heart sounds: Normal heart sounds. No murmur heard.     No gallop.   Pulmonary:      Effort: Pulmonary effort is normal.      Breath sounds: Normal breath sounds. No wheezing, rhonchi or rales.   Abdominal:      General: Abdomen is flat. Bowel sounds are normal.      Palpations: Abdomen is soft.   Musculoskeletal:      Cervical back: Neck supple.   Skin:     General: Skin is warm and dry.   Neurological:      Mental Status: She is alert and oriented to person, place, and time.          ASSESSMENT AND PLAN:   Pat was seen today for pre-op exam.    Diagnoses and all orders for this visit:    Preop examination    Fecal occult blood test positive    New onset atrial fibrillation (HCC)         She is a good surgical candidate.   Patient's medical conditions are being optimally managed  Patient is cleared with low risk for upcoming proposed surgery, patient appointment with cardiology APRN on 5/27/25 for clearance and instruction on anticoagulation.  Per patient she is aware will stop Pradaxa 2 days prior to procedure. Recent labs CBC 4/28/25 HGB 12.4 per chart review, CMP  4/17/25 eGFRcr 65.  Recent EKG scan from 4/182025 reviewed stable.  This consult was sent back the referring physician, Dr. Roman.    30 minutes were spent on assessment, education, and discussion of plan.    GERARD Dumont          [1]   Social History  Socioeconomic History    Marital status:    Tobacco Use    Smoking status: Every Day     Current packs/day: 1.00     Average packs/day: 1 pack/day for 56.0 years (56.0 ttl pk-yrs)     Types: Cigarettes    Smokeless tobacco: Never   Vaping Use    Vaping status: Never Used   Substance and Sexual Activity    Alcohol use: Never    Drug use: Never     Social Drivers of Health     Food Insecurity: Low Risk  (4/13/2025)    Received from [facility]    Food Insecurity     Have there been times that your food ran out, and you didn't have money to get more?: No     Are there times that you worry that this might happen?: No   Transportation Needs: Low Risk  (4/13/2025)    Received from [facility]    Transportation Needs     Do you have trouble getting transportation to medical appointments?: No   Housing Stability: Low Risk  (4/13/2025)    Received from [facility]    Housing Stability     Are you worried that your electric, gas, oil, or water might be shut off?: No     Are you concerned about having a safe and reliable place to live?: No   [2]   Past Medical History:   Amastia    Compression fracture of T9 vertebra (HCC)    Lobular carcinoma of breast (HCC)    Dx/ 9/2015. LEFT. T1c,N0,M0. Lobular. ER+,OH+,Her2/Dania-. Oncotype Dx. Risk = 10%. Rx. MRM > Femara.  Dx. 9/2015. RIGHT.T1c,N0,M0. Lobular + LCIS, ER+,OH+,Her2/Dania-. Oncotype Dx. 11% risk. Rx. MRM + SND > Femara.  Dx/ 9/2015. LEFT. T1c,N0,M0. Lobular. ER+,OH+,Her2/Dania-. Oncotype Dx. Risk = 10%. Rx. MRM > Femara.    Malignant neoplasm of breast (HCC)    Midline thoracic back pain    S/P bilateral mastectomy    S/P breast reconstruction, bilateral     Situational anxiety    Situational depression    Symptomatic menopausal or female climacteric states   [3]   Past Surgical History:  Procedure Laterality Date    Appendectomy  10/2023    w perf and peritonitis    Excis lumbar disk,one level     [4] No Known Allergies  [5]   Current Outpatient Medications   Medication Sig Dispense Refill    pantoprazole 40 MG Oral Tab EC Take 1 tablet (40 mg total) by mouth before breakfast. 1 HR BEFORE BREAKFAST      OMEPRAZOLE 40 MG Oral Capsule Delayed Release TAKE 1 CAPSULE(40 MG) BY MOUTH BEFORE DINNER 30 capsule 0    GABAPENTIN 100 MG Oral Cap TAKE 1 CAPSULE(100 MG) BY MOUTH THREE TIMES DAILY 90 capsule 2    dilTIAZem 30 MG Oral Tab Take 1 tablet (30 mg total) by mouth 3 (three) times daily.      metoprolol tartrate 50 MG Oral Tab Take 1 tablet (50 mg total) by mouth 2 (two) times daily.      DULOXETINE 30 MG Oral Cap DR Particles TAKE 1 CAPSULE(30 MG) BY MOUTH DAILY 30 capsule 2    Calcium Carb-Cholecalciferol (OS-ADITHYA CALCIUM + D3) 500-5 MG-MCG Oral Tab Take by mouth.      Calcium Carbonate-Vitamin D (CALCIUM + D OR) Take 1 tablet by mouth 2 (two) times daily with meals.      Compound Medication Apply 1 g topically 4 (four) times daily as needed. Compound Cream: Diclofenac Sodium 3%, Cyclobenzaprine HCL 2%, Lidocaine HCL 2% (15 g tube) 1 each 0    apixaban 5 MG Oral Tab Take 1 tablet (5 mg total) by mouth 2 (two) times daily.      metoprolol tartrate 25 MG Oral Tab Take 1 tablet (25 mg total) by mouth 2 (two) times daily. (Patient not taking: Reported on 5/28/2025) 60 tablet 0    denosumab 60 MG/ML Subcutaneous Solution Prefilled Syringe Inject 1 mL (60 mg total) into the skin one time.      melatonin 3 MG Oral Tab Take 1 tablet (3 mg total) by mouth nightly. (Patient not taking: Reported on 5/28/2025)

## 2025-06-11 ENCOUNTER — TELEPHONE (OUTPATIENT)
Dept: FAMILY MEDICINE CLINIC | Facility: CLINIC | Age: 80
End: 2025-06-11

## 2025-06-11 NOTE — TELEPHONE ENCOUNTER
Would you be ok with extending her home health with her current health issues gong on-    Please advise-thank you

## 2025-06-24 ENCOUNTER — MED REC SCAN ONLY (OUTPATIENT)
Dept: FAMILY MEDICINE CLINIC | Facility: CLINIC | Age: 80
End: 2025-06-24

## 2025-06-24 PROBLEM — J18.9 PNEUMONIA: Status: ACTIVE | Noted: 2025-06-12

## 2025-06-24 NOTE — PROGRESS NOTES
Subjective:   Pat Roach is a 79 year old female who presents for hospital follow up.   She was discharged from [facility] to home with home [facility]   Admit Date: 6/12  Discharge Date: 6/16  Hospital Discharge Diagnosis: pneumonia     I accessed [facility] and/or Care Everywhere and personally reviewed the following for the recent hospitalization: provider notes, consults, summaries, labs and other test results and the pertinent findings are documented below.     HPI: ***  History of Present Illness        History/Other:   Current Medications:  Medication Reconciliation:  I am aware of an inpatient discharge within the last 30 days.  The discharge medication list has been reconciled with the patient's current medication list and reviewed by me. See medication list for additions of new medication, and changes to current doses of medications and discontinued medications.  Active Meds, Sig Only[1]    Review of Systems:  GENERAL: weight stable, energy stable, no sweating  SKIN: denies any unusual skin lesions  EYES: denies blurred vision or double vision  HEENT: denies nasal congestion, sinus pain or ST  LUNGS: denies shortness of breath with exertion  CARDIOVASCULAR: denies chest pain on exertion or palpitations  GI: denies abdominal pain, denies heartburn, denies diarrhea  MUSCULOSKELETAL: denies pain, normal range of motion of extremities  NEURO: denies headaches, denies dizziness, denies weakness  PSYCHE: denies depression or anxiety  HEMATOLOGIC: denies hx of anemia, or bruising, denies bleeding  ENDOCRINE: denies thyroid history  ALL/ASTHMA: denies hx of allergy or asthma    Objective:   No LMP recorded. Patient is postmenopausal.  Estimated body mass index is 26.26 kg/m² as calculated from the following:    Height as of 4/21/25: 5' 1\" (1.549 m).    Weight as of 5/28/25: 139 lb (63 kg).   There were no vitals taken for this visit.   GENERAL: well developed, well nourished, in no apparent  distress  SKIN: no rashes, no suspicious lesions  HEENT: atraumatic, normocephalic, ears and throat are clear  EYES: PERRLA, EOMI, conjunctiva are clear  NECK: supple, no adenopathy, no bruits  CHEST: no chest tenderness  LUNGS: clear to auscultation  CARDIO: RRR without murmur  GI: good BS's, no masses, HSM or tenderness  MUSCULOSKELETAL: back is not tender, FROM of the extremities  EXTREMITIES: no cyanosis, clubbing or edema  NEURO: Oriented times three, cranial nerves are intact, motor and sensory are grossly intact    Physical Exam          Assessment & Plan:   There are no diagnoses linked to this encounter.    Assessment & Plan          {Tip  Follow Up:8307}  No follow-ups on file.              [1]   No outpatient medications have been marked as taking for the 6/27/25 encounter (Appointment) with Grazyna Kimble APRN.

## 2025-06-26 ENCOUNTER — TELEPHONE (OUTPATIENT)
Dept: FAMILY MEDICINE CLINIC | Facility: CLINIC | Age: 80
End: 2025-06-26

## 2025-06-26 RX ORDER — METOPROLOL TARTRATE 25 MG/1
12.5 TABLET, FILM COATED ORAL 2 TIMES DAILY
COMMUNITY
Start: 2025-06-23

## 2025-06-26 NOTE — TELEPHONE ENCOUNTER
Spoke with Saray who advised patient had recently been hospitalized for pneumonia so now are going to re-instate home health so needs to get authorization that Dr Porter will sign orders.    During hospitalization patient's Metoprolol was changed from 50 mg BID to 12.5 mg BID and she got confused as to which tablet to cut in half, so Saray has set up her meds for her and reports her vitals are fine, BP) 106/60 and HR in normal range.

## 2025-06-26 NOTE — TELEPHONE ENCOUNTER
Looking to see if Dr would sign home health orders and patient took wrong dose of metoprolol tartrate 50 MG Oral Tab 25mg instead of 12.5mg

## 2025-06-27 ENCOUNTER — OFFICE VISIT (OUTPATIENT)
Dept: FAMILY MEDICINE CLINIC | Facility: CLINIC | Age: 80
End: 2025-06-27
Payer: MEDICARE

## 2025-06-27 VITALS
TEMPERATURE: 98 F | SYSTOLIC BLOOD PRESSURE: 120 MMHG | WEIGHT: 129 LBS | HEIGHT: 61 IN | DIASTOLIC BLOOD PRESSURE: 68 MMHG | BODY MASS INDEX: 24.35 KG/M2

## 2025-06-27 DIAGNOSIS — R63.4 WEIGHT LOSS, UNINTENTIONAL: ICD-10-CM

## 2025-06-27 DIAGNOSIS — Z09 HOSPITAL DISCHARGE FOLLOW-UP: Primary | ICD-10-CM

## 2025-06-27 DIAGNOSIS — E87.6 HYPOKALEMIA: ICD-10-CM

## 2025-06-27 DIAGNOSIS — I48.91 ATRIAL FIBRILLATION, UNSPECIFIED TYPE (HCC): ICD-10-CM

## 2025-06-27 LAB
ALBUMIN SERPL-MCNC: 4.4 G/DL (ref 3.2–4.8)
ALBUMIN/GLOB SERPL: 1.6 {RATIO} (ref 1–2)
ALP LIVER SERPL-CCNC: 62 U/L (ref 55–142)
ALT SERPL-CCNC: 19 U/L (ref 10–49)
ANION GAP SERPL CALC-SCNC: 10 MMOL/L (ref 0–18)
BASOPHILS # BLD AUTO: 0.08 X10(3) UL (ref 0–0.2)
BASOPHILS NFR BLD AUTO: 0.6 %
BILIRUB SERPL-MCNC: 0.2 MG/DL (ref 0.2–1.1)
BUN BLD-MCNC: 22 MG/DL (ref 9–23)
CALCIUM BLD-MCNC: 10 MG/DL (ref 8.7–10.6)
CHLORIDE SERPL-SCNC: 99 MMOL/L (ref 98–112)
CO2 SERPL-SCNC: 32 MMOL/L (ref 21–32)
CREAT BLD-MCNC: 1.57 MG/DL (ref 0.55–1.02)
EGFRCR SERPLBLD CKD-EPI 2021: 33 ML/MIN/1.73M2 (ref 60–?)
EOSINOPHIL # BLD AUTO: 0.32 X10(3) UL (ref 0–0.7)
EOSINOPHIL NFR BLD AUTO: 2.4 %
ERYTHROCYTE [DISTWIDTH] IN BLOOD BY AUTOMATED COUNT: 15.9 %
FASTING STATUS PATIENT QL REPORTED: NO
GLOBULIN PLAS-MCNC: 2.7 G/DL (ref 2–3.5)
GLUCOSE BLD-MCNC: 120 MG/DL (ref 70–99)
HCT VFR BLD AUTO: 36.6 % (ref 35–48)
HGB BLD-MCNC: 11.1 G/DL (ref 12–16)
IMM GRANULOCYTES # BLD AUTO: 0.06 X10(3) UL (ref 0–1)
IMM GRANULOCYTES NFR BLD: 0.4 %
LYMPHOCYTES # BLD AUTO: 3.06 X10(3) UL (ref 1–4)
LYMPHOCYTES NFR BLD AUTO: 22.8 %
MCH RBC QN AUTO: 25.8 PG (ref 26–34)
MCHC RBC AUTO-ENTMCNC: 30.3 G/DL (ref 31–37)
MCV RBC AUTO: 85.1 FL (ref 80–100)
MONOCYTES # BLD AUTO: 0.95 X10(3) UL (ref 0.1–1)
MONOCYTES NFR BLD AUTO: 7.1 %
NEUTROPHILS # BLD AUTO: 8.98 X10 (3) UL (ref 1.5–7.7)
NEUTROPHILS # BLD AUTO: 8.98 X10(3) UL (ref 1.5–7.7)
NEUTROPHILS NFR BLD AUTO: 66.7 %
OSMOLALITY SERPL CALC.SUM OF ELEC: 297 MOSM/KG (ref 275–295)
PLATELET # BLD AUTO: 288 10(3)UL (ref 150–450)
PROT SERPL-MCNC: 7.1 G/DL (ref 5.7–8.2)
RBC # BLD AUTO: 4.3 X10(6)UL (ref 3.8–5.3)
SODIUM SERPL-SCNC: 141 MMOL/L (ref 136–145)
WBC # BLD AUTO: 13.5 X10(3) UL (ref 4–11)

## 2025-06-27 PROCEDURE — 80053 COMPREHEN METABOLIC PANEL: CPT

## 2025-06-27 PROCEDURE — 85025 COMPLETE CBC W/AUTO DIFF WBC: CPT

## 2025-06-27 RX ORDER — DILTIAZEM HYDROCHLORIDE 240 MG/1
240 CAPSULE, COATED, EXTENDED RELEASE ORAL DAILY
COMMUNITY
Start: 2025-06-23

## 2025-06-27 RX ORDER — AMIODARONE HYDROCHLORIDE 200 MG/1
200 TABLET ORAL DAILY
COMMUNITY
Start: 2025-06-23 | End: 2026-06-26

## 2025-06-27 RX ORDER — DABIGATRAN ETEXILATE 150 MG/1
150 CAPSULE ORAL 2 TIMES DAILY
COMMUNITY
Start: 2025-05-27 | End: 2025-06-27 | Stop reason: ALTCHOICE

## 2025-06-27 RX ORDER — FUROSEMIDE 20 MG/1
10 TABLET ORAL
COMMUNITY
Start: 2025-06-23

## 2025-06-27 RX ORDER — RIVAROXABAN 15 MG/1
15 TABLET, FILM COATED ORAL
COMMUNITY

## 2025-06-27 RX ORDER — FERROUS SULFATE 325(65) MG
325 TABLET, DELAYED RELEASE (ENTERIC COATED) ORAL
COMMUNITY

## 2025-06-27 NOTE — PROGRESS NOTES
Subjective:   Pat Roach is a 79 year old female who presents for hospital follow up.   She was discharged from [facility]) to Home   Admit Date: 6/12/25  Discharge Date: 6/16/25  Hospital Discharge Diagnosis: Pneumonia, right sided pleural effusion, Afib    She will be seeing cardiology 7/9  Interactive contact within 2 business days post discharge     I accessed [employer] and/or Care Everywhere and personally reviewed the following for the recent hospitalization: provider notes, consults, summaries, labs and other test results and the pertinent findings are documented below.     History of Present Illness    She has experienced multiple recent hospitalizations due to pnemonia and afib. Initially, she was taken to the hospital because she couldn't breathe and was subsequently transferred to another facility where she stayed for eight days. During this time, various medications were tried to stabilize her condition, and a cardioversion was considered but not performed as a medication combination was found to be effective.    She notes a significant weight fluctuation, with a difference between her home scale and the hospital scale, reporting a weight of 125 pounds at home and 129 pounds at the hospital. She has a history of pneumonia and underwent a colonoscopy complicated by a blockage attributed to diverticulitis. During the procedure, her heart rate increased significantly, requiring intervention to stabilize it, and she was kept overnight for observation.    She is currently on multiple medications to manage her heart condition, though specific names and dosages are not detailed. Her dietary intake has changed, with a decrease in appetite and portion sizes. She feels full quickly and has lost some weight. She is concerned about dietary restrictions, particularly regarding sodium intake, and is trying to manage her diet to avoid exacerbating her heart condition.    No recent fevers  or shortness of breath. She is resting more and not engaging in much physical activity. She reports having more energy recently and is able to walk around her garden with family assistance. She is monitoring her oxygen levels at home.      History/Other:   Current Medications:  Medication Reconciliation:  I am aware of an inpatient discharge within the last 30 days.  The discharge medication list has been reconciled with the patient's current medication list and reviewed by me. See medication list for additions of new medication, and changes to current doses of medications and discontinued medications.  Active Meds, Sig Only[1]    Review of Systems:  GENERAL: weight stable, energy stable, no sweating  SKIN: denies any unusual skin lesions  EYES: denies blurred vision or double vision  HEENT: denies nasal congestion, sinus pain or ST  LUNGS: denies shortness of breath with exertion  CARDIOVASCULAR: denies chest pain on exertion or palpitations  GI: denies abdominal pain, denies heartburn, denies diarrhea  MUSCULOSKELETAL: denies pain, normal range of motion of extremities  NEURO: denies headaches, denies dizziness, denies weakness  PSYCHE: denies depression or anxiety  HEMATOLOGIC: denies hx of anemia, or bruising, denies bleeding  ENDOCRINE: denies thyroid history  ALL/ASTHMA: denies hx of allergy or asthma    Objective:   No LMP recorded. Patient is postmenopausal.  Estimated body mass index is 24.37 kg/m² as calculated from the following:    Height as of this encounter: 5' 1\" (1.549 m).    Weight as of this encounter: 129 lb (58.5 kg).   /68   Temp 97.5 °F (36.4 °C) (Temporal)   Ht 5' 1\" (1.549 m)   Wt 129 lb (58.5 kg)   BMI 24.37 kg/m²    GENERAL: well developed, well nourished, in no apparent distress  SKIN: no rashes, no suspicious lesions  HEENT: atraumatic, normocephalic, ears and throat are clear  EYES: PERRLA, EOMI, conjunctiva are clear  NECK: supple, no adenopathy, no bruits  CHEST: no chest  tenderness  LUNGS: clear to auscultation  CARDIO: RRR without murmur  MUSCULOSKELETAL: back is not tender, FROM of the extremities  EXTREMITIES: no cyanosis, clubbing or edema  NEURO: Oriented times three, cranial nerves are intact, motor and sensory are grossly intact      Assessment & Plan  Atrial fibrillation  Atrial fibrillation managed with medication. Cardioversion was considered but not performed. Current heart rate is 89 bpm.  - Monitor heart rate and symptoms of heart failure.  - Follow up with cardiologist on July 9th.    Chronic kidney disease, stage 1  Chronic kidney disease stage 1 with GFR in the 40s. Monitoring due to age-related decline and medication effects. No acute issues.  - Monitor kidney function with regular blood tests.    Hypokalemia  Low potassium levels during hospitalization. Current dietary intake appears adequate, but monitoring is necessary.  - Order blood test to check potassium levels.    Hypertension  Hypertension managed with medication. Blood pressure monitoring is ongoing.  - Monitor blood pressure regularly.  - Hold blood pressure medication if systolic <100 or diastolic <60.    Dietary Management  Discussion about dietary restrictions, particularly sodium intake, to manage heart and kidney health. Advised to adhere to a low-sodium diet, aiming for less than 2000 mg of sodium per day.  - Adhere to a low-sodium diet, aiming for less than 2000 mg of sodium per day.  - Monitor for signs of fluid retention such as swelling in ankles or fingers.           The following individual(s) verbally consented to be recorded using ambient AI listening technology and understand that they can each withdraw their consent to this listening technology at any point by asking the clinician to turn off or pause the recording:    Patient name: Pat Roach        GERARD Tierney         [1]   Outpatient Medications Marked as Taking for the 6/27/25 encounter (Office Visit) with Grazyna Kimble APRN    Medication Sig    amiodarone 200 MG Oral Tab Take 1 tablet (200 mg total) by mouth daily.    dabigatran 150 MG Oral Cap Take 1 capsule (150 mg total) by mouth 2 (two) times daily.    dilTIAZem  MG Oral Capsule SR 24 Hr Take 1 capsule (240 mg total) by mouth daily.    furosemide 20 MG Oral Tab Take 0.5 tablets (10 mg total) by mouth daily as needed.    metoprolol tartrate 25 MG Oral Tab Take 0.5 tablets (12.5 mg total) by mouth 2 (two) times daily.    pantoprazole 40 MG Oral Tab EC Take 1 tablet (40 mg total) by mouth before breakfast. 1 HR BEFORE BREAKFAST    OMEPRAZOLE 40 MG Oral Capsule Delayed Release TAKE 1 CAPSULE(40 MG) BY MOUTH BEFORE DINNER    GABAPENTIN 100 MG Oral Cap TAKE 1 CAPSULE(100 MG) BY MOUTH THREE TIMES DAILY    apixaban 5 MG Oral Tab Take 1 tablet (5 mg total) by mouth 2 (two) times daily.    DULOXETINE 30 MG Oral Cap DR Particles TAKE 1 CAPSULE(30 MG) BY MOUTH DAILY    Calcium Carb-Cholecalciferol (OS-ADITHYA CALCIUM + D3) 500-5 MG-MCG Oral Tab Take by mouth.    denosumab 60 MG/ML Subcutaneous Solution Prefilled Syringe Inject 1 mL (60 mg total) into the skin one time.    Calcium Carbonate-Vitamin D (CALCIUM + D OR) Take 1 tablet by mouth 2 (two) times daily with meals.    Compound Medication Apply 1 g topically 4 (four) times daily as needed. Compound Cream: Diclofenac Sodium 3%, Cyclobenzaprine HCL 2%, Lidocaine HCL 2% (15 g tube)

## 2025-06-30 DIAGNOSIS — R73.9 HYPERGLYCEMIA: ICD-10-CM

## 2025-06-30 DIAGNOSIS — E87.6 HYPOKALEMIA: Primary | ICD-10-CM

## 2025-06-30 DIAGNOSIS — R63.4 WEIGHT LOSS, UNINTENTIONAL: ICD-10-CM

## 2025-07-01 ENCOUNTER — TELEPHONE (OUTPATIENT)
Dept: FAMILY MEDICINE CLINIC | Facility: CLINIC | Age: 80
End: 2025-07-01

## 2025-07-01 PROBLEM — I48.91 ATRIAL FIBRILLATION (HCC): Status: ACTIVE | Noted: 2025-04-12

## 2025-07-01 PROBLEM — R53.83 FATIGUE: Status: ACTIVE | Noted: 2025-06-05

## 2025-07-01 RX ORDER — OMEPRAZOLE 40 MG/1
40 CAPSULE, DELAYED RELEASE ORAL DAILY
Qty: 90 CAPSULE | Refills: 1 | Status: SHIPPED | OUTPATIENT
Start: 2025-07-01

## 2025-07-01 NOTE — TELEPHONE ENCOUNTER
Patient requesting refill on omeprazole    LOV  6-27-25 APRN   3-19-25 Dr Porter    LAST LAB  6-27-25    LAST RX  5-17-25  #30    Next OV    Future Appointments   Date Time Provider Department Center   7/2/2025  3:15 PM REF [facility] REF [id number] Ref [facility] [place]   7/28/2025  1:00 PM OS DEXA RM1 OS DEXA [place]   8/1/2025 12:30 PM Apolonia Marino DO [id number] [facility] [place]         PROTOCOL    Gastrointestional Medication Protocol [id number] 09:46 AM   Protocol Details In person appointment or virtual visit in the past 12 mos or appointment in next 3 mos    Medication is active on med list

## 2025-07-01 NOTE — TELEPHONE ENCOUNTER
Comment: Test not reported due to hemolysis.  If you would like to repeat this test at no charge, physician must contact Client Services at [phone].     Called [phone] and spoke with Vidya she said that we are only able to do a repeat AST and Potassium at no charge. I let Vidya know that I would speak with Grazyna GEE and call her back with what we are going to do.

## 2025-07-01 NOTE — TELEPHONE ENCOUNTER
Patient called pharmacy for refill and they informed her that Dr had stopped her OMEPRAZOLE 40 MG Oral Capsule Delayed Release and she is wanting to know what to do,

## 2025-07-02 ENCOUNTER — LABORATORY ENCOUNTER (OUTPATIENT)
Dept: LAB | Age: 80
End: 2025-07-02
Attending: FAMILY MEDICINE
Payer: MEDICARE

## 2025-07-02 DIAGNOSIS — M81.0 AGE-RELATED OSTEOPOROSIS WITHOUT CURRENT PATHOLOGICAL FRACTURE: ICD-10-CM

## 2025-07-02 DIAGNOSIS — R94.4 DECREASED GFR: Primary | ICD-10-CM

## 2025-07-02 LAB
ALBUMIN SERPL-MCNC: 4.4 G/DL (ref 3.2–4.8)
ALBUMIN/GLOB SERPL: 1.6 {RATIO} (ref 1–2)
ALP LIVER SERPL-CCNC: 78 U/L (ref 55–142)
ALT SERPL-CCNC: 20 U/L (ref 10–49)
ANION GAP SERPL CALC-SCNC: 11 MMOL/L (ref 0–18)
AST SERPL-CCNC: 32 U/L (ref ?–34)
BILIRUB SERPL-MCNC: 0.2 MG/DL (ref 0.2–1.1)
BUN BLD-MCNC: 12 MG/DL (ref 9–23)
CALCIUM BLD-MCNC: 9.5 MG/DL (ref 8.7–10.6)
CHLORIDE SERPL-SCNC: 105 MMOL/L (ref 98–112)
CO2 SERPL-SCNC: 23 MMOL/L (ref 21–32)
CREAT BLD-MCNC: 1.52 MG/DL (ref 0.55–1.02)
EGFRCR SERPLBLD CKD-EPI 2021: 35 ML/MIN/1.73M2 (ref 60–?)
FASTING STATUS PATIENT QL REPORTED: NO
GLOBULIN PLAS-MCNC: 2.7 G/DL (ref 2–3.5)
GLUCOSE BLD-MCNC: 90 MG/DL (ref 70–99)
OSMOLALITY SERPL CALC.SUM OF ELEC: 287 MOSM/KG (ref 275–295)
PHOSPHATE SERPL-MCNC: 3 MG/DL (ref 2.4–5.1)
POTASSIUM SERPL-SCNC: 4.6 MMOL/L (ref 3.5–5.1)
PROT SERPL-MCNC: 7.1 G/DL (ref 5.7–8.2)
SODIUM SERPL-SCNC: 139 MMOL/L (ref 136–145)
VIT D+METAB SERPL-MCNC: 40.4 NG/ML (ref 30–100)

## 2025-07-02 PROCEDURE — 82306 VITAMIN D 25 HYDROXY: CPT

## 2025-07-02 PROCEDURE — 80053 COMPREHEN METABOLIC PANEL: CPT

## 2025-07-02 PROCEDURE — 36415 COLL VENOUS BLD VENIPUNCTURE: CPT

## 2025-07-02 PROCEDURE — 84100 ASSAY OF PHOSPHORUS: CPT

## 2025-07-07 ENCOUNTER — TELEPHONE (OUTPATIENT)
Dept: FAMILY MEDICINE CLINIC | Facility: CLINIC | Age: 80
End: 2025-07-07

## 2025-07-07 NOTE — TELEPHONE ENCOUNTER
Patient misplaced paper with urologist information, calling to speak with nurse to get information again.

## 2025-07-08 ENCOUNTER — TELEPHONE (OUTPATIENT)
Facility: CLINIC | Age: 80
End: 2025-07-08

## 2025-07-08 NOTE — TELEPHONE ENCOUNTER
Last injeciton: 1/31/25   Next injection: 8/1/25     No dental clearance needed- dentures   Confirm insurance.   Labs ordered.     Resubmitted to [employer], awaiting report.    Labs resulted, routed for review.    Pt stated she needed the EKG prior to April and I informed patient that she needed an appointment and she was not happy about it but I scheduled her an appointment for today at 1:15PM

## 2025-07-09 NOTE — TELEPHONE ENCOUNTER
Fax From: [employer]     Summary of Benefits     Prolia Injection  Benefits     Place in [address]

## 2025-07-10 NOTE — TELEPHONE ENCOUNTER
Summary of Benefits   Coverage details: Prolia and administration will be subject to a $257.00 deductible ($257.00 met) and 20.0% coinsurance.   No Authorization required.     Placed in brown RN folder.

## 2025-07-11 NOTE — TELEPHONE ENCOUNTER
Spoke with patient on telephone.    Updated on lab comments and Prolia status.     States she will plan on keeping 8/1 appointment, but wanted to update that she was recently diagnosed with A-Fib, and they are planning on doing a cardioversion to get back in normal rhythm. States hoping to schedule around this, but if any problems will follow up with our office.    Closing this encounter for now.

## 2025-07-19 DIAGNOSIS — M80.00XG AGE-RELATED OSTEOPOROSIS WITH CURRENT PATHOLOGICAL FRACTURE WITH DELAYED HEALING, SUBSEQUENT ENCOUNTER: ICD-10-CM

## 2025-07-19 DIAGNOSIS — M54.6 MIDLINE THORACIC BACK PAIN, UNSPECIFIED CHRONICITY: ICD-10-CM

## 2025-07-19 DIAGNOSIS — M54.50 LUMBAR BACK PAIN: ICD-10-CM

## 2025-07-21 NOTE — TELEPHONE ENCOUNTER
Attempted to call patient on both numbers provided, no answer now voicemail. Patient needs to schedule follow  up.        Medication: DULOXETINE 30 MG     [place]    Last office visit: 12/16/24  Due back to clinic per last office note:  6 months  Date next office visit scheduled:          Last OV note recommendation:   Plan:   > Continue duloxetine at 30 mg every day: Does not need refills today  > Follow-up every 6 months for evaluation and medication management  > Refill gabapentin 100 mg 3 times daily #90 with 2 refills today  > Okay to continue Advil dual action as directed on bottle: Patient taking it approximately 5 times per day and gabapentin 100 mg twice daily  >use TENs twice per day   > Continue calcium vitamin D supplementation with reduced dose per oncology: 2/2 breast cancer  > Okay to continue all nonmedication management for thoracic and lumbar pain  > If thoracic or lumbar pain becomes unmanageable we will need to update imaging: Patient is not wanting to update at this point  >call for med refills  No orders of the defined types were placed in this encou

## 2025-07-22 ENCOUNTER — TELEPHONE (OUTPATIENT)
Dept: FAMILY MEDICINE CLINIC | Facility: CLINIC | Age: 80
End: 2025-07-22

## 2025-07-22 DIAGNOSIS — M54.50 LUMBAR BACK PAIN: ICD-10-CM

## 2025-07-22 DIAGNOSIS — M54.6 MIDLINE THORACIC BACK PAIN, UNSPECIFIED CHRONICITY: ICD-10-CM

## 2025-07-22 DIAGNOSIS — M80.00XG AGE-RELATED OSTEOPOROSIS WITH CURRENT PATHOLOGICAL FRACTURE WITH DELAYED HEALING, SUBSEQUENT ENCOUNTER: ICD-10-CM

## 2025-07-22 RX ORDER — DULOXETIN HYDROCHLORIDE 30 MG/1
30 CAPSULE, DELAYED RELEASE ORAL DAILY
Qty: 30 CAPSULE | Refills: 2 | Status: SHIPPED | OUTPATIENT
Start: 2025-07-22

## 2025-07-22 RX ORDER — DULOXETIN HYDROCHLORIDE 30 MG/1
30 CAPSULE, DELAYED RELEASE ORAL DAILY
Qty: 30 CAPSULE | Refills: 2 | OUTPATIENT
Start: 2025-07-22

## 2025-07-22 NOTE — TELEPHONE ENCOUNTER
Spoke to patient. Patient states she is having Dr Porter take over this medication for her. Medication denied.

## 2025-07-22 NOTE — TELEPHONE ENCOUNTER
Patient is wanting to know if Dr. Porter would fill DULOXETINE 30 MG Oral Cap DR Particles instead of the specialist that was filling it.

## 2025-07-22 NOTE — TELEPHONE ENCOUNTER
See intake message    Last refilled 6-26-25  #30 FELICE Villarreal    LOV  6-27-25 APRN  3-19-25 Dr Porter    LAST LAB  7-2-25    Next OV    Future Appointments   Date Time Provider Department Center   7/28/2025  1:00 PM OS DEXA RM1 OS DEXA [place]   8/1/2025 12:30 PM Apolonia Marino DO [id number] [facility] [place]

## 2025-07-24 ENCOUNTER — MED REC SCAN ONLY (OUTPATIENT)
Dept: FAMILY MEDICINE CLINIC | Facility: CLINIC | Age: 80
End: 2025-07-24

## 2025-07-28 ENCOUNTER — HOSPITAL ENCOUNTER (OUTPATIENT)
Dept: BONE DENSITY | Age: 80
Discharge: HOME | End: 2025-07-28
Attending: STUDENT IN AN ORGANIZED HEALTH CARE EDUCATION/TRAINING PROGRAM

## 2025-07-28 DIAGNOSIS — M81.0 AGE-RELATED OSTEOPOROSIS WITHOUT CURRENT PATHOLOGICAL FRACTURE: ICD-10-CM

## 2025-07-28 PROCEDURE — 77080 DXA BONE DENSITY AXIAL: CPT | Performed by: STUDENT IN AN ORGANIZED HEALTH CARE EDUCATION/TRAINING PROGRAM

## 2025-07-30 ENCOUNTER — HOME HEALTH CHARGES (OUTPATIENT)
Dept: FAMILY MEDICINE CLINIC | Facility: CLINIC | Age: 80
End: 2025-07-30

## 2025-07-30 DIAGNOSIS — I11.0 HYPERTENSIVE HEART DISEASE WITH CONGESTIVE HEART FAILURE, UNSPECIFIED HEART FAILURE TYPE (HCC): Primary | ICD-10-CM

## 2025-08-01 ENCOUNTER — OFFICE VISIT (OUTPATIENT)
Facility: CLINIC | Age: 80
End: 2025-08-01

## 2025-08-01 VITALS
BODY MASS INDEX: 24.17 KG/M2 | HEART RATE: 61 BPM | SYSTOLIC BLOOD PRESSURE: 126 MMHG | HEIGHT: 61 IN | DIASTOLIC BLOOD PRESSURE: 64 MMHG | OXYGEN SATURATION: 98 % | WEIGHT: 128 LBS

## 2025-08-01 DIAGNOSIS — M81.0 AGE-RELATED OSTEOPOROSIS WITHOUT CURRENT PATHOLOGICAL FRACTURE: Primary | ICD-10-CM

## 2025-08-01 DIAGNOSIS — E04.2 MULTINODULAR GOITER: ICD-10-CM

## 2025-08-01 PROCEDURE — 96372 THER/PROPH/DIAG INJ SC/IM: CPT | Performed by: STUDENT IN AN ORGANIZED HEALTH CARE EDUCATION/TRAINING PROGRAM

## 2025-08-01 PROCEDURE — 99214 OFFICE O/P EST MOD 30 MIN: CPT | Performed by: STUDENT IN AN ORGANIZED HEALTH CARE EDUCATION/TRAINING PROGRAM

## 2025-08-04 ENCOUNTER — MED REC SCAN ONLY (OUTPATIENT)
Dept: FAMILY MEDICINE CLINIC | Facility: CLINIC | Age: 80
End: 2025-08-04

## 2025-08-05 ENCOUNTER — OFFICE VISIT (OUTPATIENT)
Dept: FAMILY MEDICINE CLINIC | Facility: CLINIC | Age: 80
End: 2025-08-05

## 2025-08-05 VITALS
SYSTOLIC BLOOD PRESSURE: 133 MMHG | BODY MASS INDEX: 25.63 KG/M2 | HEIGHT: 60.25 IN | OXYGEN SATURATION: 97 % | DIASTOLIC BLOOD PRESSURE: 63 MMHG | HEART RATE: 72 BPM | TEMPERATURE: 97 F | RESPIRATION RATE: 12 BRPM | WEIGHT: 132.25 LBS

## 2025-08-05 DIAGNOSIS — R91.1 PULMONARY NODULE: Chronic | ICD-10-CM

## 2025-08-05 DIAGNOSIS — N18.32 CKD STAGE 3B, GFR 30-44 ML/MIN (HCC): ICD-10-CM

## 2025-08-05 DIAGNOSIS — Z00.00 MEDICARE ANNUAL WELLNESS VISIT, SUBSEQUENT: ICD-10-CM

## 2025-08-05 DIAGNOSIS — I50.32 CHRONIC HEART FAILURE WITH PRESERVED EJECTION FRACTION (HCC): ICD-10-CM

## 2025-08-05 DIAGNOSIS — I48.19 PERSISTENT ATRIAL FIBRILLATION (HCC): ICD-10-CM

## 2025-08-05 DIAGNOSIS — E78.2 MIXED HYPERLIPIDEMIA: Primary | ICD-10-CM

## 2025-08-05 DIAGNOSIS — F17.200 TOBACCO USE DISORDER: ICD-10-CM

## 2025-08-05 DIAGNOSIS — M35.3 POLYMYALGIA RHEUMATICA (HCC): ICD-10-CM

## 2025-08-05 PROCEDURE — G0439 PPPS, SUBSEQ VISIT: HCPCS | Performed by: FAMILY MEDICINE

## 2025-08-06 ENCOUNTER — TELEPHONE (OUTPATIENT)
Dept: FAMILY MEDICINE CLINIC | Facility: CLINIC | Age: 80
End: 2025-08-06

## 2025-08-07 PROBLEM — I50.32 CHRONIC HEART FAILURE WITH PRESERVED EJECTION FRACTION (HCC): Status: ACTIVE | Noted: 2025-07-07

## 2025-08-07 PROBLEM — R53.83 FATIGUE: Status: RESOLVED | Noted: 2025-06-05 | Resolved: 2025-08-07

## 2025-08-07 PROBLEM — M54.6 MIDLINE THORACIC BACK PAIN: Status: RESOLVED | Noted: 2021-07-26 | Resolved: 2025-08-07

## 2025-08-07 PROBLEM — R91.1 PULMONARY NODULE: Chronic | Status: ACTIVE | Noted: 2025-08-07

## 2025-08-07 PROBLEM — R19.5 FECAL OCCULT BLOOD TEST POSITIVE: Status: RESOLVED | Noted: 2025-05-28 | Resolved: 2025-08-07

## 2025-08-07 PROBLEM — M54.50 LUMBAR BACK PAIN: Status: RESOLVED | Noted: 2024-12-16 | Resolved: 2025-08-07

## 2025-08-07 PROBLEM — Z01.818 PREOP EXAMINATION: Status: RESOLVED | Noted: 2025-05-28 | Resolved: 2025-08-07

## 2025-08-07 PROBLEM — N18.32 CKD STAGE 3B, GFR 30-44 ML/MIN (HCC): Status: ACTIVE | Noted: 2025-08-07

## 2025-08-07 PROBLEM — J18.9 PNEUMONIA: Status: RESOLVED | Noted: 2025-06-12 | Resolved: 2025-08-07

## 2025-08-07 PROBLEM — Z86.0101 HX OF ADENOMATOUS COLONIC POLYPS: Status: RESOLVED | Noted: 2019-08-04 | Resolved: 2025-08-07

## 2025-08-07 PROBLEM — R91.1 PULMONARY NODULE: Status: ACTIVE | Noted: 2025-08-07

## 2025-08-07 PROBLEM — G89.29 OTHER CHRONIC PAIN: Status: RESOLVED | Noted: 2023-05-02 | Resolved: 2025-08-07

## 2025-08-10 DIAGNOSIS — M80.00XA PATHOLOGICAL FRACTURE OF OTHER SITE DUE TO OSTEOPOROSIS, UNSPECIFIED OSTEOPOROSIS TYPE, INITIAL ENCOUNTER: ICD-10-CM

## 2025-08-10 DIAGNOSIS — M54.50 LUMBAR BACK PAIN: ICD-10-CM

## 2025-08-10 DIAGNOSIS — G89.29 OTHER CHRONIC PAIN: ICD-10-CM

## 2025-08-10 DIAGNOSIS — M54.6 MIDLINE THORACIC BACK PAIN, UNSPECIFIED CHRONICITY: ICD-10-CM

## 2025-08-11 RX ORDER — GABAPENTIN 100 MG/1
100 CAPSULE ORAL 3 TIMES DAILY
Qty: 90 CAPSULE | Refills: 2 | Status: SHIPPED | OUTPATIENT
Start: 2025-08-11

## 2025-08-18 ENCOUNTER — TELEPHONE (OUTPATIENT)
Dept: FAMILY MEDICINE CLINIC | Facility: CLINIC | Age: 80
End: 2025-08-18

## (undated) DIAGNOSIS — Z00.00 LABORATORY EXAMINATION ORDERED AS PART OF A ROUTINE GENERAL MEDICAL EXAMINATION: ICD-10-CM

## (undated) DIAGNOSIS — M80.00XA PATHOLOGICAL FRACTURE OF OTHER SITE DUE TO OSTEOPOROSIS, UNSPECIFIED OSTEOPOROSIS TYPE, INITIAL ENCOUNTER: ICD-10-CM

## (undated) DIAGNOSIS — F11.90 CHRONIC, CONTINUOUS USE OF OPIOIDS: ICD-10-CM

## (undated) DIAGNOSIS — M80.00XA PATHOLOGICAL FRACTURE OF OTHER SITE DUE TO OSTEOPOROSIS, UNSPECIFIED OSTEOPOROSIS TYPE, INITIAL ENCOUNTER: Primary | ICD-10-CM

## (undated) DEVICE — REMOVER PREP SOLUTION 4OZ

## (undated) DEVICE — SKIN MARKER DUAL TIP WITH RULER CAP AND LABELS: Brand: DEVON

## (undated) DEVICE — BANDAID CURAD 3IN X 1IN

## (undated) DEVICE — GLOVE SURG SENSICARE SZ 7-1/2

## (undated) DEVICE — PAIN TRAY: Brand: MEDLINE INDUSTRIES, INC.

## (undated) DEVICE — GLOVE SURG SENSICARE SZ 6-1/2

## (undated) DEVICE — RF CANNULA, CVD: Brand: VENOM

## (undated) DEVICE — NEEDLE SPINAL 22X3-1/2 BLK

## (undated) DEVICE — GLOVE SURG SENSICARE SZ 7

## (undated) DEVICE — SHEET, T, LAPAROTOMY, STERILE: Brand: MEDLINE

## (undated) DEVICE — MEGADYNE ELECTRODE ADULT PT RT

## (undated) NOTE — Clinical Note
Hello--patient to see you in a few weeks  wonders about injection of corticosteroids in lumbar area  I suggested she start conversation with you as this seems reasonable based in images    Sincerely,      Dia Tijerina MD

## (undated) NOTE — Clinical Note
Pt all cleared for Prolia #2, she said she'll check the weather and call ahead to schedule. After her inj, please order q6 month labs and book RN/MD combo appointment.

## (undated) NOTE — Clinical Note
She is now concerned she has a physical dependence on the opioids as she is very anxious  Nauseous and irritable    Pacing and waiting for the next dose  Yet not necessarily having pain    She is distressed by this and wants to wean off    This is not nece

## (undated) NOTE — LETTER
09/21/20        Nakul Rivera  [address] [facility]      Dear Nancy Calvilloost,    [facility] records indicate that you have outstanding lab work and or testing that was ordered for you and has not yet been completed:  Orders Placed This Encounter      ESTELA W